# Patient Record
Sex: MALE | Race: WHITE | Employment: FULL TIME | URBAN - METROPOLITAN AREA
[De-identification: names, ages, dates, MRNs, and addresses within clinical notes are randomized per-mention and may not be internally consistent; named-entity substitution may affect disease eponyms.]

---

## 2017-11-13 LAB
ABSOLUTE BASO #: 0 10*3/UL (ref 0–0.1)
ABSOLUTE EOS #: 0 10*3/UL (ref 0–0.4)
ABSOLUTE NEUT #: 4 10*3/UL (ref 2.3–7.9)
ALBUMIN: 3.5 GM/DL (ref 3.1–4.5)
ALP BLD-CCNC: 114 U/L (ref 45–117)
ALT SERPL-CCNC: 39 U/L (ref 12–78)
AST SERPL-CCNC: 14 IU/L (ref 3–35)
BASOPHILS %: 0.5 % (ref 0–1)
BILIRUB SERPL-MCNC: 0.5 MG/DL (ref 0.2–1)
BUN BLDV-MCNC: 24 MG/DL (ref 7–24)
CALCIUM SERPL-MCNC: 9.1 MG/DL (ref 8.5–10.5)
CHLORIDE BLD-SCNC: 101 MMOL/L (ref 98–107)
CHOLESTEROL: 136 MG/DL
CO2: 26 MMOL/L (ref 21–32)
CREAT SERPL-MCNC: 1.09 MG/DL (ref 0.7–1.3)
EOSINOPHILS %: 0 % (ref 1–4)
GFR AFRICAN AMERICAN: > 60 ML/MIN
GFR SERPL CREATININE-BSD FRML MDRD: >60 ML/MIN/
GLUCOSE: 312 MG/DL (ref 65–99)
HCT VFR BLD CALC: 46.1 % (ref 42–52)
HDLC SERPL-MCNC: 26 MG/DL (ref 40–60)
HEMOGLOBIN: 15.9 G/DL (ref 14–18)
IMMATURE GRANULOCYTES #: 0 10*3/UL (ref 0–0.1)
IMMATURE GRANULOCYTES: 0.3 % (ref 0–1)
LDL CHOLESTEROL: 54 MG/DL (ref 9–159)
LYMPHOCYTE %: 30.9 % (ref 27–41)
LYMPHOCYTES # BLD: 2 10*3/UL (ref 1.3–4.4)
MCH RBC QN AUTO: 28.9 PG (ref 27–31)
MCHC RBC AUTO-ENTMCNC: 34.5 G/DL (ref 33–37)
MCV RBC AUTO: 83.8 FL (ref 80–94)
MONOCYTES # BLD: 0.4 10*3/UL (ref 0.1–1)
MONOCYTES %: 6.4 % (ref 3–9)
NEUTROPHILS %: 61.9 % (ref 47–73)
NUCLEATED RED BLOOD CELLS: 0 % (ref 0–0)
PDW BLD-RTO: 12.5 % (ref 0–14.5)
PLATELET # BLD: 203 10*3/UL (ref 130–400)
PMV BLD AUTO: 10 FL (ref 9.6–12.3)
POTASSIUM SERPL-SCNC: 4 MMOL/L (ref 3.5–5.1)
RBC # BLD: 5.5 10*6/UL (ref 4.5–5.9)
SODIUM BLD-SCNC: 137 MMOL/L (ref 136–145)
TOTAL PROTEIN: 7.1 GM/DL (ref 6.4–8.2)
TRIGL SERPL-MCNC: 281 MG/DL
TSH SERPL DL<=0.05 MIU/L-ACNC: 3.58 UIU/ML (ref 0.36–4.75)
VLDLC SERPL CALC-MCNC: 56 MG/DL (ref 6–40)
WBC # BLD: 6.5 10*3/UL (ref 4.8–10.8)

## 2022-08-02 ENCOUNTER — OFFICE VISIT (OUTPATIENT)
Dept: NEUROSURGERY | Age: 57
End: 2022-08-02
Payer: COMMERCIAL

## 2022-08-02 DIAGNOSIS — M51.27 LUMBOSACRAL DISC HERNIATION: Primary | ICD-10-CM

## 2022-08-02 DIAGNOSIS — M54.16 LUMBAR RADICULOPATHY: ICD-10-CM

## 2022-08-02 DIAGNOSIS — M51.9 LUMBAR DISC DISEASE: Primary | ICD-10-CM

## 2022-08-02 PROCEDURE — 99202 OFFICE O/P NEW SF 15 MIN: CPT

## 2022-08-02 PROCEDURE — 99203 OFFICE O/P NEW LOW 30 MIN: CPT | Performed by: PHYSICIAN ASSISTANT

## 2022-08-02 ASSESSMENT — ENCOUNTER SYMPTOMS
BACK PAIN: 1
GASTROINTESTINAL NEGATIVE: 1
ALLERGIC/IMMUNOLOGIC NEGATIVE: 1
EYES NEGATIVE: 1
RESPIRATORY NEGATIVE: 1

## 2022-08-02 NOTE — PROGRESS NOTES
Subjective:      Patient ID: Evelyn Peña is a 49 y.o. male. Back Pain  This is a new problem. Episode onset: 6 weeks. The problem occurs daily. The problem has been gradually worsening since onset. The pain is present in the lumbar spine (and right leg pain/numbness into the foot. ). The quality of the pain is described as aching and shooting. The pain is at a severity of 9/10. The symptoms are aggravated by twisting and standing. Treatments tried: BEATA, PT, gabapentin, motrin. The treatment provided mild relief. Review of Systems   Constitutional: Negative. HENT: Negative. Eyes: Negative. Respiratory: Negative. Cardiovascular: Negative. Gastrointestinal: Negative. Endocrine: Negative. Genitourinary: Negative. Musculoskeletal:  Positive for back pain. Skin: Negative. Allergic/Immunologic: Negative. Neurological: Negative. Hematological: Negative. Psychiatric/Behavioral: Negative. Objective:   Physical Exam  Constitutional:       Appearance: Normal appearance. HENT:      Head: Normocephalic and atraumatic. Nose: Nose normal.   Eyes:      Pupils: Pupils are equal, round, and reactive to light. Pulmonary:      Effort: Pulmonary effort is normal.   Abdominal:      General: There is no distension. Skin:     General: Skin is warm and dry. Neurological:      Mental Status: He is alert. GCS: GCS eye subscore is 4. GCS verbal subscore is 5. GCS motor subscore is 6. Cranial Nerves: Cranial nerves are intact. Sensory: Sensory deficit present. Motor: Weakness present. Gait: Gait is intact. Deep Tendon Reflexes:      Reflex Scores:       Patellar reflexes are 2+ on the right side and 2+ on the left side. Achilles reflexes are 2+ on the right side and 2+ on the left side.      Comments: Decreased sensation to LT right L5 dist.    Right DF 4/5   Psychiatric:         Mood and Affect: Mood normal.       Assessment:      49year old male with 6 week history of severe right leg pain. Lumbar MRI reveals left L4-5 and right L5-S1 disc herniation. He has failed PT, gabapentin, BEATA, and NSAIDS. Plan:      He wishes to proceed with a L4-S1 laminectomy. JOSEP Aquino    I have interviewed and examined the patient and agree with above. He has back and bilateral leg pain and the right side is worse. He has failed over 3 months of PT and epidurals and his MRI shows stenosis from L4-S1 with a right L5-S1 herniated disk.   I am recommending an L4-S1 laminectomy and rigth L5-S1 diskectomy    Codie Sanchez MD

## 2022-08-03 ENCOUNTER — PREP FOR PROCEDURE (OUTPATIENT)
Dept: NEUROSURGERY | Age: 57
End: 2022-08-03

## 2022-08-03 DIAGNOSIS — Z01.818 PRE-OP TESTING: Primary | ICD-10-CM

## 2022-08-03 RX ORDER — SODIUM CHLORIDE 9 MG/ML
INJECTION, SOLUTION INTRAVENOUS PRN
Status: CANCELLED | OUTPATIENT
Start: 2022-08-03

## 2022-08-03 RX ORDER — SODIUM CHLORIDE 9 MG/ML
INJECTION, SOLUTION INTRAVENOUS CONTINUOUS
Status: CANCELLED | OUTPATIENT
Start: 2022-08-03

## 2022-08-03 RX ORDER — SODIUM CHLORIDE 0.9 % (FLUSH) 0.9 %
5-40 SYRINGE (ML) INJECTION EVERY 12 HOURS SCHEDULED
Status: CANCELLED | OUTPATIENT
Start: 2022-08-03

## 2022-08-03 RX ORDER — SODIUM CHLORIDE 0.9 % (FLUSH) 0.9 %
5-40 SYRINGE (ML) INJECTION PRN
Status: CANCELLED | OUTPATIENT
Start: 2022-08-03

## 2022-08-11 RX ORDER — INSULIN DEGLUDEC INJECTION 100 U/ML
80 INJECTION, SOLUTION SUBCUTANEOUS DAILY
COMMUNITY

## 2022-08-11 RX ORDER — AMLODIPINE BESYLATE 10 MG/1
10 TABLET ORAL DAILY
COMMUNITY

## 2022-08-11 RX ORDER — PRAVASTATIN SODIUM 40 MG
40 TABLET ORAL DAILY
COMMUNITY

## 2022-08-11 RX ORDER — SEMAGLUTIDE 1.34 MG/ML
INJECTION, SOLUTION SUBCUTANEOUS WEEKLY
COMMUNITY

## 2022-08-11 RX ORDER — LOSARTAN POTASSIUM 25 MG/1
25 TABLET ORAL DAILY
COMMUNITY

## 2022-08-11 RX ORDER — INSULIN ASPART 100 [IU]/ML
INJECTION, SOLUTION INTRAVENOUS; SUBCUTANEOUS
COMMUNITY

## 2022-08-11 NOTE — PROGRESS NOTES
[address]   PRE-ADMISSION TESTING GENERAL INSTRUCTIONS  PAT Phone Number: [phone]      GENERAL INSTRUCTIONS:    [x] Antibacterial Soap Shower Night before or AM of Surgery. [x] Do not wear makeup, lotions, powders, deodorant. [x] Nothing by mouth after midnight, including gum, candy, mints, or water. [x] You may brush your teeth, gargle, but do NOT swallow water. [x] No tobacco products, illegal drugs, or alcohol within 24 hours of your surgery. [x] Jewelry or valuables should not be brought to the hospital. All body and/or tongue piercing's must be removed prior to arriving to hospital. ALL hair pins must be removed. [x] Bring insurance card and photo ID. [x] Transfusion Bracelet: Please bring with you to hospital, day of surgery. PARKING INSTRUCTIONS:     [x] ARRIVAL TIME: 1100 on 8/26/22  [x] Enter into the [employer]. Two people may accompany you. Masks are required. [x] Parking Lot \"I\" is where you will park. It is located on the corner of [facility] [facility] and [address]. The entrance is on [address]. To enter, press the button and the gate will lift. A free token will be provided to exit the lot. EDUCATION INSTRUCTIONS:         [x] Pre-admission Testing educational folder given  [x] Incentive Spirometry,coughing & deep breathing exercises reviewed. [x] Fluoroscopy-Xray used in surgery reviewed with patient. Educational pamphlet placed in chart. [x] Pain: Post-op pain is normal and to be expected. You will be asked to rate your pain from 0-10. [x] Spine Navigator to see in PAT. MEDICATION INSTRUCTIONS:    [x] Bring a complete list of your medications, please write the last time you took the medicine, give this list to the nurse. [x] Take the following medications the morning of surgery with 1-2 ounces of water: amlodipine  [x] Stop herbal supplements, NSAIDS and vitamins 5 days before your surgery.   [x] DO NOT take any diabetic medicine the morning of surgery. Follow instructions for insulin the day before surgery. [x] If you are diabetic and your blood sugar is low or you feel symptomatic, you may drink 1-2 ounces of apple juice or take a glucose tablet.            -The morning of your procedure, you may call the pre-op area if you have concerns about your blood sugar [phone]. [x] Follow physician instructions regarding any blood thinners you may be taking. WHAT TO EXPECT:    [x] The day of surgery you will be greeted and checked in by the [employer].  In addition, you will be registered in the [facility] by a Patient Access Representative. Please bring your photo ID and insurance card. A nurse will greet you in accordance to the time you are needed in the pre-op area to prepare you for surgery. Please do not be discouraged if you are not greeted in the order you arrive as there are many variables that are involved in patient preparation. Your patience is greatly appreciated as you wait for your nurse. Please bring in items such as: books, magazines, newspapers, electronics, or any other items  to occupy your time in the waiting area. [x]  Delays may occur with surgery and staff will make a sincere effort to keep you informed of delays. If any delays occur with your procedure, we apologize ahead of time for your inconvenience as we recognize the value of your time.

## 2022-08-17 ENCOUNTER — TELEPHONE (OUTPATIENT)
Dept: NEUROSURGERY | Age: 57
End: 2022-08-17

## 2022-08-17 DIAGNOSIS — M48.061 SPINAL STENOSIS OF LUMBAR REGION WITHOUT NEUROGENIC CLAUDICATION: Primary | ICD-10-CM

## 2022-08-17 RX ORDER — OXYCODONE HYDROCHLORIDE AND ACETAMINOPHEN 5; 325 MG/1; MG/1
1 TABLET ORAL EVERY 4 HOURS PRN
Qty: 42 TABLET | Refills: 0 | Status: SHIPPED | OUTPATIENT
Start: 2022-08-17 | End: 2022-08-24

## 2022-08-17 NOTE — TELEPHONE ENCOUNTER
I am able to give him 7 day supply of pain medications, but that is all prior to surgery. Script sent to pharmacy indicated.

## 2022-08-18 ENCOUNTER — TELEPHONE (OUTPATIENT)
Dept: NEUROSURGERY | Age: 57
End: 2022-08-18

## 2022-08-19 ENCOUNTER — HOSPITAL ENCOUNTER (OUTPATIENT)
Dept: GENERAL RADIOLOGY | Age: 57
Discharge: HOME | End: 2022-08-21
Payer: COMMERCIAL

## 2022-08-19 ENCOUNTER — HOSPITAL ENCOUNTER (OUTPATIENT)
Dept: PREADMISSION TESTING | Age: 57
Discharge: HOME | End: 2022-08-19
Payer: COMMERCIAL

## 2022-08-19 VITALS
WEIGHT: 242 LBS | BODY MASS INDEX: 30.09 KG/M2 | SYSTOLIC BLOOD PRESSURE: 145 MMHG | RESPIRATION RATE: 16 BRPM | HEIGHT: 75 IN | DIASTOLIC BLOOD PRESSURE: 82 MMHG | HEART RATE: 85 BPM | OXYGEN SATURATION: 97 % | TEMPERATURE: 98 F

## 2022-08-19 DIAGNOSIS — Z01.818 PRE-OP TESTING: ICD-10-CM

## 2022-08-19 DIAGNOSIS — Z01.812 PRE-OPERATIVE LABORATORY EXAMINATION: Primary | ICD-10-CM

## 2022-08-19 LAB
ABO/RH: NORMAL
ANION GAP SERPL CALCULATED.3IONS-SCNC: 11 MMOL/L (ref 7–16)
ANTIBODY SCREEN: NORMAL
BACTERIA: NORMAL /HPF
BASOPHILS ABSOLUTE: 0.03 E9/L (ref 0–0.2)
BASOPHILS RELATIVE PERCENT: 0.5 % (ref 0–2)
BILIRUBIN URINE: NEGATIVE
BLOOD, URINE: NEGATIVE
BUN BLDV-MCNC: 17 MG/DL (ref 6–20)
CALCIUM SERPL-MCNC: 9 MG/DL (ref 8.6–10.2)
CHLORIDE BLD-SCNC: 103 MMOL/L (ref 98–107)
CLARITY: CLEAR
CO2: 25 MMOL/L (ref 22–29)
COLOR: YELLOW
CREAT SERPL-MCNC: 0.8 MG/DL (ref 0.7–1.2)
EKG ATRIAL RATE: 81 BPM
EKG P AXIS: 50 DEGREES
EKG P-R INTERVAL: 198 MS
EKG Q-T INTERVAL: 370 MS
EKG QRS DURATION: 98 MS
EKG QTC CALCULATION (BAZETT): 429 MS
EKG R AXIS: 16 DEGREES
EKG T AXIS: 57 DEGREES
EKG VENTRICULAR RATE: 81 BPM
EOSINOPHILS ABSOLUTE: 0.22 E9/L (ref 0.05–0.5)
EOSINOPHILS RELATIVE PERCENT: 3.5 % (ref 0–6)
GFR AFRICAN AMERICAN: >60
GFR NON-AFRICAN AMERICAN: >60 ML/MIN/1.73
GLUCOSE BLD-MCNC: 215 MG/DL (ref 74–99)
GLUCOSE URINE: 100 MG/DL
HCT VFR BLD CALC: 41.6 % (ref 37–54)
HEMOGLOBIN: 14.5 G/DL (ref 12.5–16.5)
IMMATURE GRANULOCYTES #: 0.01 E9/L
IMMATURE GRANULOCYTES %: 0.2 % (ref 0–5)
INR BLD: 1
KETONES, URINE: NEGATIVE MG/DL
LEUKOCYTE ESTERASE, URINE: NEGATIVE
LYMPHOCYTES ABSOLUTE: 1.33 E9/L (ref 1.5–4)
LYMPHOCYTES RELATIVE PERCENT: 20.9 % (ref 20–42)
MCH RBC QN AUTO: 29.9 PG (ref 26–35)
MCHC RBC AUTO-ENTMCNC: 34.9 % (ref 32–34.5)
MCV RBC AUTO: 85.8 FL (ref 80–99.9)
MONOCYTES ABSOLUTE: 0.4 E9/L (ref 0.1–0.95)
MONOCYTES RELATIVE PERCENT: 6.3 % (ref 2–12)
NEUTROPHILS ABSOLUTE: 4.37 E9/L (ref 1.8–7.3)
NEUTROPHILS RELATIVE PERCENT: 68.6 % (ref 43–80)
NITRITE, URINE: NEGATIVE
PDW BLD-RTO: 12.6 FL (ref 11.5–15)
PH UA: 6 (ref 5–9)
PLATELET # BLD: 192 E9/L (ref 130–450)
PMV BLD AUTO: 9 FL (ref 7–12)
POTASSIUM REFLEX MAGNESIUM: 4.2 MMOL/L (ref 3.5–5)
PROTEIN UA: >=300 MG/DL
PROTHROMBIN TIME: 11.4 SEC (ref 9.3–12.4)
RBC # BLD: 4.85 E12/L (ref 3.8–5.8)
RBC UA: NORMAL /HPF (ref 0–2)
SODIUM BLD-SCNC: 139 MMOL/L (ref 132–146)
SPECIFIC GRAVITY UA: >=1.03 (ref 1–1.03)
UROBILINOGEN, URINE: 0.2 E.U./DL
WBC # BLD: 6.4 E9/L (ref 4.5–11.5)
WBC UA: NORMAL /HPF (ref 0–5)

## 2022-08-19 PROCEDURE — 87088 URINE BACTERIA CULTURE: CPT

## 2022-08-19 PROCEDURE — 86900 BLOOD TYPING SEROLOGIC ABO: CPT

## 2022-08-19 PROCEDURE — 86901 BLOOD TYPING SEROLOGIC RH(D): CPT

## 2022-08-19 PROCEDURE — 85610 PROTHROMBIN TIME: CPT

## 2022-08-19 PROCEDURE — 80048 BASIC METABOLIC PNL TOTAL CA: CPT

## 2022-08-19 PROCEDURE — 36415 COLL VENOUS BLD VENIPUNCTURE: CPT

## 2022-08-19 PROCEDURE — 85025 COMPLETE CBC W/AUTO DIFF WBC: CPT

## 2022-08-19 PROCEDURE — 86850 RBC ANTIBODY SCREEN: CPT

## 2022-08-19 PROCEDURE — 93005 ELECTROCARDIOGRAM TRACING: CPT

## 2022-08-19 PROCEDURE — 81001 URINALYSIS AUTO W/SCOPE: CPT

## 2022-08-19 PROCEDURE — 71046 X-RAY EXAM CHEST 2 VIEWS: CPT

## 2022-08-19 NOTE — PROGRESS NOTES
Saw pt / wife in PAT, reviewed:    Surgical Procedure-reviewed procedure and post-op events:pre-op/PACU, Unit admission, PT/OT evaluation, Discharge [address] Stay expectations-reviewed importance of early mobilization. Instructions of Spine Precautions given-PT to review on evaluation. Surgical Pathway Booklet-reviewed and given  Post-op/Discharge Instructions-reviewed activity allowances/restrictions  Use of Incentive Spirometer-instructed on importance of use post-op and continued use @ home to prevent complications. Instructions on use given  Setting realistic pain goals-reaching goal before discharge. ORTHOTICS: none ordered/needed for this procedure    Discharge Plans- home with self/family care. Verbalized understanding of all instructions and questions answered. Contact number given.     Kaitlin Mohamud RN, Spine Navigator  [phone] Office  [phone] Cell

## 2022-08-21 LAB — URINE CULTURE, ROUTINE: NORMAL

## 2022-08-25 ENCOUNTER — ANESTHESIA EVENT (OUTPATIENT)
Dept: OPERATING ROOM | Age: 57
DRG: 520 | End: 2022-08-25
Payer: COMMERCIAL

## 2022-08-26 ENCOUNTER — APPOINTMENT (OUTPATIENT)
Dept: GENERAL RADIOLOGY | Age: 57
DRG: 520 | End: 2022-08-26
Attending: NEUROLOGICAL SURGERY
Payer: COMMERCIAL

## 2022-08-26 ENCOUNTER — HOSPITAL ENCOUNTER (INPATIENT)
Age: 57
LOS: 1 days | Discharge: HOME HEALTH | DRG: 520 | End: 2022-08-29
Attending: NEUROLOGICAL SURGERY | Admitting: NEUROLOGICAL SURGERY
Payer: COMMERCIAL

## 2022-08-26 ENCOUNTER — ANESTHESIA (OUTPATIENT)
Dept: OPERATING ROOM | Age: 57
DRG: 520 | End: 2022-08-26
Payer: COMMERCIAL

## 2022-08-26 DIAGNOSIS — M48.062 LUMBAR STENOSIS WITH NEUROGENIC CLAUDICATION: Primary | ICD-10-CM

## 2022-08-26 DIAGNOSIS — M48.061 SPINAL STENOSIS OF LUMBAR REGION, UNSPECIFIED WHETHER NEUROGENIC CLAUDICATION PRESENT: ICD-10-CM

## 2022-08-26 DIAGNOSIS — M51.26 HERNIATED LUMBAR INTERVERTEBRAL DISC: ICD-10-CM

## 2022-08-26 DIAGNOSIS — Z01.812 PRE-OPERATIVE LABORATORY EXAMINATION: ICD-10-CM

## 2022-08-26 LAB
METER GLUCOSE: 105 MG/DL (ref 74–99)
METER GLUCOSE: 112 MG/DL (ref 74–99)
METER GLUCOSE: 150 MG/DL (ref 74–99)
METER GLUCOSE: 185 MG/DL (ref 74–99)
METER GLUCOSE: 245 MG/DL (ref 74–99)
METER GLUCOSE: 91 MG/DL (ref 74–99)
METER GLUCOSE: 98 MG/DL (ref 74–99)

## 2022-08-26 PROCEDURE — 6370000000 HC RX 637 (ALT 250 FOR IP): Performed by: NEUROLOGICAL SURGERY

## 2022-08-26 PROCEDURE — 0SB40ZZ EXCISION OF LUMBOSACRAL DISC, OPEN APPROACH: ICD-10-PCS | Performed by: NEUROLOGICAL SURGERY

## 2022-08-26 PROCEDURE — 63047 LAM FACETEC & FORAMOT LUMBAR: CPT | Performed by: PHYSICIAN ASSISTANT

## 2022-08-26 PROCEDURE — 2500000003 HC RX 250 WO HCPCS: Performed by: NURSE ANESTHETIST, CERTIFIED REGISTERED

## 2022-08-26 PROCEDURE — 6360000002 HC RX W HCPCS: Performed by: NURSE ANESTHETIST, CERTIFIED REGISTERED

## 2022-08-26 PROCEDURE — 2580000003 HC RX 258: Performed by: ANESTHESIOLOGY

## 2022-08-26 PROCEDURE — 6360000002 HC RX W HCPCS: Performed by: NEUROLOGICAL SURGERY

## 2022-08-26 PROCEDURE — 82962 GLUCOSE BLOOD TEST: CPT

## 2022-08-26 PROCEDURE — 88304 TISSUE EXAM BY PATHOLOGIST: CPT

## 2022-08-26 PROCEDURE — 2500000003 HC RX 250 WO HCPCS: Performed by: NEUROLOGICAL SURGERY

## 2022-08-26 PROCEDURE — 3209999900 FLUORO FOR SURGICAL PROCEDURES

## 2022-08-26 PROCEDURE — 2720000010 HC SURG SUPPLY STERILE: Performed by: NEUROLOGICAL SURGERY

## 2022-08-26 PROCEDURE — G0378 HOSPITAL OBSERVATION PER HR: HCPCS

## 2022-08-26 PROCEDURE — 3700000000 HC ANESTHESIA ATTENDED CARE: Performed by: NEUROLOGICAL SURGERY

## 2022-08-26 PROCEDURE — 63030 LAMOT DCMPRN NRV RT 1 LMBR: CPT | Performed by: PHYSICIAN ASSISTANT

## 2022-08-26 PROCEDURE — 2580000003 HC RX 258: Performed by: NEUROLOGICAL SURGERY

## 2022-08-26 PROCEDURE — 2709999900 HC NON-CHARGEABLE SUPPLY: Performed by: NEUROLOGICAL SURGERY

## 2022-08-26 PROCEDURE — 2580000003 HC RX 258: Performed by: PHYSICIAN ASSISTANT

## 2022-08-26 PROCEDURE — 3600000004 HC SURGERY LEVEL 4 BASE: Performed by: NEUROLOGICAL SURGERY

## 2022-08-26 PROCEDURE — 6360000002 HC RX W HCPCS: Performed by: PHYSICIAN ASSISTANT

## 2022-08-26 PROCEDURE — A4217 STERILE WATER/SALINE, 500 ML: HCPCS | Performed by: NEUROLOGICAL SURGERY

## 2022-08-26 PROCEDURE — 00NY0ZZ RELEASE LUMBAR SPINAL CORD, OPEN APPROACH: ICD-10-PCS | Performed by: NEUROLOGICAL SURGERY

## 2022-08-26 PROCEDURE — 6360000002 HC RX W HCPCS: Performed by: ANESTHESIOLOGY

## 2022-08-26 PROCEDURE — 7100000000 HC PACU RECOVERY - FIRST 15 MIN: Performed by: NEUROLOGICAL SURGERY

## 2022-08-26 PROCEDURE — 63047 LAM FACETEC & FORAMOT LUMBAR: CPT | Performed by: NEUROLOGICAL SURGERY

## 2022-08-26 PROCEDURE — 2580000003 HC RX 258: Performed by: NURSE ANESTHETIST, CERTIFIED REGISTERED

## 2022-08-26 PROCEDURE — 3700000001 HC ADD 15 MINUTES (ANESTHESIA): Performed by: NEUROLOGICAL SURGERY

## 2022-08-26 PROCEDURE — 3600000014 HC SURGERY LEVEL 4 ADDTL 15MIN: Performed by: NEUROLOGICAL SURGERY

## 2022-08-26 PROCEDURE — 7100000001 HC PACU RECOVERY - ADDTL 15 MIN: Performed by: NEUROLOGICAL SURGERY

## 2022-08-26 PROCEDURE — 63030 LAMOT DCMPRN NRV RT 1 LMBR: CPT | Performed by: NEUROLOGICAL SURGERY

## 2022-08-26 RX ORDER — LIDOCAINE HYDROCHLORIDE 20 MG/ML
INJECTION, SOLUTION INTRAVENOUS PRN
Status: DISCONTINUED | OUTPATIENT
Start: 2022-08-26 | End: 2022-08-26 | Stop reason: SDUPTHER

## 2022-08-26 RX ORDER — DEXTROSE MONOHYDRATE 100 MG/ML
INJECTION, SOLUTION INTRAVENOUS CONTINUOUS PRN
Status: DISCONTINUED | OUTPATIENT
Start: 2022-08-26 | End: 2022-08-29 | Stop reason: HOSPADM

## 2022-08-26 RX ORDER — SODIUM CHLORIDE 0.9 % (FLUSH) 0.9 %
5-40 SYRINGE (ML) INJECTION PRN
Status: DISCONTINUED | OUTPATIENT
Start: 2022-08-26 | End: 2022-08-29 | Stop reason: HOSPADM

## 2022-08-26 RX ORDER — ONDANSETRON 4 MG/1
4 TABLET, ORALLY DISINTEGRATING ORAL EVERY 8 HOURS PRN
Status: DISCONTINUED | OUTPATIENT
Start: 2022-08-26 | End: 2022-08-29 | Stop reason: HOSPADM

## 2022-08-26 RX ORDER — MIDAZOLAM HYDROCHLORIDE 1 MG/ML
INJECTION INTRAMUSCULAR; INTRAVENOUS PRN
Status: DISCONTINUED | OUTPATIENT
Start: 2022-08-26 | End: 2022-08-26 | Stop reason: SDUPTHER

## 2022-08-26 RX ORDER — INSULIN LISPRO 100 [IU]/ML
0-8 INJECTION, SOLUTION INTRAVENOUS; SUBCUTANEOUS
Status: DISCONTINUED | OUTPATIENT
Start: 2022-08-27 | End: 2022-08-29 | Stop reason: HOSPADM

## 2022-08-26 RX ORDER — MORPHINE SULFATE 2 MG/ML
2 INJECTION, SOLUTION INTRAMUSCULAR; INTRAVENOUS
Status: DISCONTINUED | OUTPATIENT
Start: 2022-08-26 | End: 2022-08-29 | Stop reason: HOSPADM

## 2022-08-26 RX ORDER — OXYCODONE HYDROCHLORIDE 5 MG/1
5 TABLET ORAL EVERY 4 HOURS PRN
Status: DISCONTINUED | OUTPATIENT
Start: 2022-08-26 | End: 2022-08-29 | Stop reason: HOSPADM

## 2022-08-26 RX ORDER — KETAMINE HCL IN NACL, ISO-OSM 100MG/10ML
SYRINGE (ML) INJECTION PRN
Status: DISCONTINUED | OUTPATIENT
Start: 2022-08-26 | End: 2022-08-26 | Stop reason: SDUPTHER

## 2022-08-26 RX ORDER — SODIUM CHLORIDE 9 MG/ML
INJECTION, SOLUTION INTRAVENOUS CONTINUOUS PRN
Status: DISCONTINUED | OUTPATIENT
Start: 2022-08-26 | End: 2022-08-26 | Stop reason: SDUPTHER

## 2022-08-26 RX ORDER — SODIUM CHLORIDE 0.9 % (FLUSH) 0.9 %
5-40 SYRINGE (ML) INJECTION PRN
Status: DISCONTINUED | OUTPATIENT
Start: 2022-08-26 | End: 2022-08-26 | Stop reason: HOSPADM

## 2022-08-26 RX ORDER — OXYCODONE HYDROCHLORIDE 5 MG/1
10 TABLET ORAL PRN
Status: DISCONTINUED | OUTPATIENT
Start: 2022-08-26 | End: 2022-08-26 | Stop reason: HOSPADM

## 2022-08-26 RX ORDER — DIPHENHYDRAMINE HCL 25 MG
25 TABLET ORAL EVERY 6 HOURS PRN
Status: DISCONTINUED | OUTPATIENT
Start: 2022-08-26 | End: 2022-08-29 | Stop reason: HOSPADM

## 2022-08-26 RX ORDER — SODIUM PHOSPHATE, DIBASIC AND SODIUM PHOSPHATE, MONOBASIC 7; 19 G/133ML; G/133ML
1 ENEMA RECTAL DAILY PRN
Status: DISCONTINUED | OUTPATIENT
Start: 2022-08-26 | End: 2022-08-29 | Stop reason: HOSPADM

## 2022-08-26 RX ORDER — PROPOFOL 10 MG/ML
INJECTION, EMULSION INTRAVENOUS PRN
Status: DISCONTINUED | OUTPATIENT
Start: 2022-08-26 | End: 2022-08-26 | Stop reason: SDUPTHER

## 2022-08-26 RX ORDER — DIPHENHYDRAMINE HYDROCHLORIDE 50 MG/ML
25 INJECTION INTRAMUSCULAR; INTRAVENOUS EVERY 6 HOURS PRN
Status: DISCONTINUED | OUTPATIENT
Start: 2022-08-26 | End: 2022-08-29 | Stop reason: HOSPADM

## 2022-08-26 RX ORDER — PRAVASTATIN SODIUM 20 MG
40 TABLET ORAL DAILY
Status: DISCONTINUED | OUTPATIENT
Start: 2022-08-27 | End: 2022-08-29 | Stop reason: HOSPADM

## 2022-08-26 RX ORDER — BISACODYL 10 MG
10 SUPPOSITORY, RECTAL RECTAL DAILY PRN
Status: DISCONTINUED | OUTPATIENT
Start: 2022-08-26 | End: 2022-08-29 | Stop reason: HOSPADM

## 2022-08-26 RX ORDER — SODIUM CHLORIDE 9 MG/ML
INJECTION, SOLUTION INTRAVENOUS PRN
Status: DISCONTINUED | OUTPATIENT
Start: 2022-08-26 | End: 2022-08-26 | Stop reason: HOSPADM

## 2022-08-26 RX ORDER — GLYCOPYRROLATE 0.2 MG/ML
INJECTION INTRAMUSCULAR; INTRAVENOUS PRN
Status: DISCONTINUED | OUTPATIENT
Start: 2022-08-26 | End: 2022-08-26 | Stop reason: SDUPTHER

## 2022-08-26 RX ORDER — ONDANSETRON 2 MG/ML
4 INJECTION INTRAMUSCULAR; INTRAVENOUS EVERY 6 HOURS PRN
Status: DISCONTINUED | OUTPATIENT
Start: 2022-08-26 | End: 2022-08-29 | Stop reason: HOSPADM

## 2022-08-26 RX ORDER — MEPERIDINE HYDROCHLORIDE 25 MG/ML
12.5 INJECTION INTRAMUSCULAR; INTRAVENOUS; SUBCUTANEOUS EVERY 5 MIN PRN
Status: DISCONTINUED | OUTPATIENT
Start: 2022-08-26 | End: 2022-08-26 | Stop reason: HOSPADM

## 2022-08-26 RX ORDER — NEOSTIGMINE METHYLSULFATE 1 MG/ML
INJECTION, SOLUTION INTRAVENOUS PRN
Status: DISCONTINUED | OUTPATIENT
Start: 2022-08-26 | End: 2022-08-26 | Stop reason: SDUPTHER

## 2022-08-26 RX ORDER — VANCOMYCIN HYDROCHLORIDE 500 MG/10ML
INJECTION, POWDER, LYOPHILIZED, FOR SOLUTION INTRAVENOUS PRN
Status: DISCONTINUED | OUTPATIENT
Start: 2022-08-26 | End: 2022-08-26 | Stop reason: HOSPADM

## 2022-08-26 RX ORDER — SODIUM CHLORIDE 9 MG/ML
INJECTION, SOLUTION INTRAVENOUS CONTINUOUS
Status: DISCONTINUED | OUTPATIENT
Start: 2022-08-26 | End: 2022-08-26 | Stop reason: HOSPADM

## 2022-08-26 RX ORDER — DOCUSATE SODIUM 100 MG/1
100 CAPSULE, LIQUID FILLED ORAL DAILY
Status: DISCONTINUED | OUTPATIENT
Start: 2022-08-27 | End: 2022-08-29 | Stop reason: HOSPADM

## 2022-08-26 RX ORDER — LOSARTAN POTASSIUM 25 MG/1
25 TABLET ORAL DAILY
Status: DISCONTINUED | OUTPATIENT
Start: 2022-08-27 | End: 2022-08-29 | Stop reason: HOSPADM

## 2022-08-26 RX ORDER — OXYCODONE HYDROCHLORIDE 10 MG/1
10 TABLET ORAL EVERY 4 HOURS PRN
Status: DISCONTINUED | OUTPATIENT
Start: 2022-08-26 | End: 2022-08-29 | Stop reason: HOSPADM

## 2022-08-26 RX ORDER — ROCURONIUM BROMIDE 10 MG/ML
INJECTION, SOLUTION INTRAVENOUS PRN
Status: DISCONTINUED | OUTPATIENT
Start: 2022-08-26 | End: 2022-08-26 | Stop reason: SDUPTHER

## 2022-08-26 RX ORDER — SODIUM CHLORIDE 0.9 % (FLUSH) 0.9 %
5-40 SYRINGE (ML) INJECTION EVERY 12 HOURS SCHEDULED
Status: DISCONTINUED | OUTPATIENT
Start: 2022-08-26 | End: 2022-08-29 | Stop reason: HOSPADM

## 2022-08-26 RX ORDER — ACETAMINOPHEN 325 MG/1
650 TABLET ORAL EVERY 6 HOURS
Status: DISCONTINUED | OUTPATIENT
Start: 2022-08-26 | End: 2022-08-29 | Stop reason: HOSPADM

## 2022-08-26 RX ORDER — OXYCODONE HYDROCHLORIDE AND ACETAMINOPHEN 5; 325 MG/1; MG/1
1 TABLET ORAL EVERY 4 HOURS PRN
Status: DISCONTINUED | OUTPATIENT
Start: 2022-08-26 | End: 2022-08-26

## 2022-08-26 RX ORDER — POLYETHYLENE GLYCOL 3350 17 G/17G
17 POWDER, FOR SOLUTION ORAL DAILY
Status: DISCONTINUED | OUTPATIENT
Start: 2022-08-27 | End: 2022-08-29 | Stop reason: HOSPADM

## 2022-08-26 RX ORDER — DEXTROSE MONOHYDRATE 100 MG/ML
INJECTION, SOLUTION INTRAVENOUS CONTINUOUS
Status: DISCONTINUED | OUTPATIENT
Start: 2022-08-26 | End: 2022-08-29 | Stop reason: HOSPADM

## 2022-08-26 RX ORDER — OXYCODONE HYDROCHLORIDE 5 MG/1
5 TABLET ORAL PRN
Status: DISCONTINUED | OUTPATIENT
Start: 2022-08-26 | End: 2022-08-26 | Stop reason: HOSPADM

## 2022-08-26 RX ORDER — AMLODIPINE BESYLATE 10 MG/1
10 TABLET ORAL DAILY
Status: DISCONTINUED | OUTPATIENT
Start: 2022-08-27 | End: 2022-08-29 | Stop reason: HOSPADM

## 2022-08-26 RX ORDER — FENTANYL CITRATE 50 UG/ML
INJECTION, SOLUTION INTRAMUSCULAR; INTRAVENOUS PRN
Status: DISCONTINUED | OUTPATIENT
Start: 2022-08-26 | End: 2022-08-26 | Stop reason: SDUPTHER

## 2022-08-26 RX ORDER — MORPHINE SULFATE 2 MG/ML
4 INJECTION, SOLUTION INTRAMUSCULAR; INTRAVENOUS
Status: DISCONTINUED | OUTPATIENT
Start: 2022-08-26 | End: 2022-08-29 | Stop reason: HOSPADM

## 2022-08-26 RX ORDER — INSULIN LISPRO 100 [IU]/ML
0-4 INJECTION, SOLUTION INTRAVENOUS; SUBCUTANEOUS NIGHTLY
Status: DISCONTINUED | OUTPATIENT
Start: 2022-08-26 | End: 2022-08-29 | Stop reason: HOSPADM

## 2022-08-26 RX ORDER — LIDOCAINE HYDROCHLORIDE AND EPINEPHRINE 5; 5 MG/ML; UG/ML
INJECTION, SOLUTION INFILTRATION; PERINEURAL PRN
Status: DISCONTINUED | OUTPATIENT
Start: 2022-08-26 | End: 2022-08-26 | Stop reason: HOSPADM

## 2022-08-26 RX ORDER — PHENYLEPHRINE HCL IN 0.9% NACL 1 MG/10 ML
SYRINGE (ML) INTRAVENOUS PRN
Status: DISCONTINUED | OUTPATIENT
Start: 2022-08-26 | End: 2022-08-26 | Stop reason: SDUPTHER

## 2022-08-26 RX ORDER — DEXAMETHASONE SODIUM PHOSPHATE 10 MG/ML
INJECTION INTRAMUSCULAR; INTRAVENOUS PRN
Status: DISCONTINUED | OUTPATIENT
Start: 2022-08-26 | End: 2022-08-26 | Stop reason: SDUPTHER

## 2022-08-26 RX ORDER — ONDANSETRON 2 MG/ML
INJECTION INTRAMUSCULAR; INTRAVENOUS PRN
Status: DISCONTINUED | OUTPATIENT
Start: 2022-08-26 | End: 2022-08-26 | Stop reason: SDUPTHER

## 2022-08-26 RX ORDER — SODIUM CHLORIDE 0.9 % (FLUSH) 0.9 %
5-40 SYRINGE (ML) INJECTION EVERY 12 HOURS SCHEDULED
Status: DISCONTINUED | OUTPATIENT
Start: 2022-08-26 | End: 2022-08-26 | Stop reason: HOSPADM

## 2022-08-26 RX ORDER — CYCLOBENZAPRINE HCL 10 MG
10 TABLET ORAL 3 TIMES DAILY PRN
Status: DISCONTINUED | OUTPATIENT
Start: 2022-08-26 | End: 2022-08-29 | Stop reason: HOSPADM

## 2022-08-26 RX ORDER — DIPHENHYDRAMINE HYDROCHLORIDE 50 MG/ML
12.5 INJECTION INTRAMUSCULAR; INTRAVENOUS
Status: DISCONTINUED | OUTPATIENT
Start: 2022-08-26 | End: 2022-08-26 | Stop reason: HOSPADM

## 2022-08-26 RX ORDER — SODIUM CHLORIDE 9 MG/ML
INJECTION, SOLUTION INTRAVENOUS PRN
Status: DISCONTINUED | OUTPATIENT
Start: 2022-08-26 | End: 2022-08-29 | Stop reason: HOSPADM

## 2022-08-26 RX ORDER — SENNA AND DOCUSATE SODIUM 50; 8.6 MG/1; MG/1
1 TABLET, FILM COATED ORAL 2 TIMES DAILY
Status: DISCONTINUED | OUTPATIENT
Start: 2022-08-26 | End: 2022-08-29 | Stop reason: HOSPADM

## 2022-08-26 RX ORDER — SODIUM CHLORIDE 9 MG/ML
INJECTION, SOLUTION INTRAVENOUS CONTINUOUS
Status: DISCONTINUED | OUTPATIENT
Start: 2022-08-26 | End: 2022-08-29 | Stop reason: HOSPADM

## 2022-08-26 RX ORDER — BUPIVACAINE HYDROCHLORIDE 2.5 MG/ML
INJECTION, SOLUTION EPIDURAL; INFILTRATION; INTRACAUDAL PRN
Status: DISCONTINUED | OUTPATIENT
Start: 2022-08-26 | End: 2022-08-26 | Stop reason: HOSPADM

## 2022-08-26 RX ADMIN — SODIUM CHLORIDE: 9 INJECTION, SOLUTION INTRAVENOUS at 21:50

## 2022-08-26 RX ADMIN — DEXTROSE MONOHYDRATE: 100 INJECTION, SOLUTION INTRAVENOUS at 13:44

## 2022-08-26 RX ADMIN — MEPERIDINE HYDROCHLORIDE 12.5 MG: 25 INJECTION, SOLUTION INTRAMUSCULAR; INTRAVENOUS; SUBCUTANEOUS at 19:43

## 2022-08-26 RX ADMIN — PROPOFOL 150 MG: 10 INJECTION, EMULSION INTRAVENOUS at 17:13

## 2022-08-26 RX ADMIN — SODIUM CHLORIDE: 9 INJECTION, SOLUTION INTRAVENOUS at 13:43

## 2022-08-26 RX ADMIN — FENTANYL CITRATE 100 MCG: 50 INJECTION, SOLUTION INTRAMUSCULAR; INTRAVENOUS at 17:13

## 2022-08-26 RX ADMIN — SODIUM CHLORIDE: 9 INJECTION, SOLUTION INTRAVENOUS at 18:04

## 2022-08-26 RX ADMIN — DEXAMETHASONE SODIUM PHOSPHATE 10 MG: 10 INJECTION INTRAMUSCULAR; INTRAVENOUS at 17:36

## 2022-08-26 RX ADMIN — SODIUM CHLORIDE, PRESERVATIVE FREE 10 ML: 5 INJECTION INTRAVENOUS at 21:48

## 2022-08-26 RX ADMIN — OXYCODONE AND ACETAMINOPHEN 1 TABLET: 5; 325 TABLET ORAL at 13:34

## 2022-08-26 RX ADMIN — Medication 100 MCG: at 18:13

## 2022-08-26 RX ADMIN — ROCURONIUM BROMIDE 50 MG: 10 INJECTION, SOLUTION INTRAVENOUS at 17:13

## 2022-08-26 RX ADMIN — FENTANYL CITRATE 50 MCG: 50 INJECTION, SOLUTION INTRAMUSCULAR; INTRAVENOUS at 19:05

## 2022-08-26 RX ADMIN — Medication 100 MCG: at 18:18

## 2022-08-26 RX ADMIN — Medication 100 MCG: at 18:04

## 2022-08-26 RX ADMIN — ACETAMINOPHEN 650 MG: 325 TABLET, FILM COATED ORAL at 21:48

## 2022-08-26 RX ADMIN — MIDAZOLAM 2 MG: 1 INJECTION INTRAMUSCULAR; INTRAVENOUS at 17:05

## 2022-08-26 RX ADMIN — LIDOCAINE HYDROCHLORIDE 100 MG: 20 INJECTION, SOLUTION INTRAVENOUS at 17:13

## 2022-08-26 RX ADMIN — Medication 100 MCG: at 18:07

## 2022-08-26 RX ADMIN — SODIUM CHLORIDE: 9 INJECTION, SOLUTION INTRAVENOUS at 17:40

## 2022-08-26 RX ADMIN — DOCUSATE SODIUM 50 MG AND SENNOSIDES 8.6 MG 1 TABLET: 8.6; 5 TABLET, FILM COATED ORAL at 21:48

## 2022-08-26 RX ADMIN — Medication 100 MCG: at 18:02

## 2022-08-26 RX ADMIN — Medication 3 MG: at 18:57

## 2022-08-26 RX ADMIN — ROCURONIUM BROMIDE 40 MG: 10 INJECTION, SOLUTION INTRAVENOUS at 17:53

## 2022-08-26 RX ADMIN — HYDROMORPHONE HYDROCHLORIDE 0.5 MG: 1 INJECTION, SOLUTION INTRAMUSCULAR; INTRAVENOUS; SUBCUTANEOUS at 19:39

## 2022-08-26 RX ADMIN — ONDANSETRON 4 MG: 2 INJECTION INTRAMUSCULAR; INTRAVENOUS at 18:35

## 2022-08-26 RX ADMIN — CEFAZOLIN 2000 MG: 2 INJECTION, POWDER, FOR SOLUTION INTRAMUSCULAR; INTRAVENOUS at 17:37

## 2022-08-26 RX ADMIN — Medication 40 MG: at 17:13

## 2022-08-26 RX ADMIN — MEPERIDINE HYDROCHLORIDE 12.5 MG: 25 INJECTION, SOLUTION INTRAMUSCULAR; INTRAVENOUS; SUBCUTANEOUS at 19:48

## 2022-08-26 RX ADMIN — Medication 100 MCG: at 18:30

## 2022-08-26 RX ADMIN — Medication 100 MCG: at 17:58

## 2022-08-26 RX ADMIN — HYDROMORPHONE HYDROCHLORIDE 0.5 MG: 1 INJECTION, SOLUTION INTRAMUSCULAR; INTRAVENOUS; SUBCUTANEOUS at 19:45

## 2022-08-26 RX ADMIN — Medication 100 MCG: at 18:41

## 2022-08-26 RX ADMIN — GLYCOPYRROLATE 0.6 MG: 0.2 INJECTION, SOLUTION INTRAMUSCULAR; INTRAVENOUS at 18:57

## 2022-08-26 ASSESSMENT — PAIN SCALES - GENERAL
PAINLEVEL_OUTOF10: 5
PAINLEVEL_OUTOF10: 8
PAINLEVEL_OUTOF10: 8
PAINLEVEL_OUTOF10: 5
PAINLEVEL_OUTOF10: 9

## 2022-08-26 ASSESSMENT — PAIN - FUNCTIONAL ASSESSMENT
PAIN_FUNCTIONAL_ASSESSMENT: ACTIVITIES ARE NOT PREVENTED
PAIN_FUNCTIONAL_ASSESSMENT: 0-10

## 2022-08-26 ASSESSMENT — PAIN DESCRIPTION - ORIENTATION
ORIENTATION: INNER
ORIENTATION: RIGHT

## 2022-08-26 ASSESSMENT — PAIN DESCRIPTION - PAIN TYPE: TYPE: SURGICAL PAIN;ACUTE PAIN

## 2022-08-26 ASSESSMENT — PAIN DESCRIPTION - DESCRIPTORS
DESCRIPTORS: SORE;ACHING;DISCOMFORT
DESCRIPTORS: DISCOMFORT;DULL;TENDER
DESCRIPTORS: ACHING
DESCRIPTORS: DISCOMFORT;TENDER;DULL
DESCRIPTORS: DULL;ACHING;DISCOMFORT

## 2022-08-26 ASSESSMENT — PAIN DESCRIPTION - LOCATION
LOCATION: BACK

## 2022-08-26 ASSESSMENT — PAIN DESCRIPTION - ONSET: ONSET: ON-GOING

## 2022-08-26 ASSESSMENT — PAIN DESCRIPTION - FREQUENCY: FREQUENCY: CONTINUOUS

## 2022-08-26 NOTE — H&P
Back Pain  This is a new problem. Episode onset: 6 weeks. The problem occurs daily. The problem has been gradually worsening since onset. The pain is present in the lumbar spine (and right leg pain/numbness into the foot. ). The quality of the pain is described as aching and shooting. The pain is at a severity of 9/10. The symptoms are aggravated by twisting and standing. Treatments tried: BEATA, PT, gabapentin, motrin. The treatment provided mild relief. Past Medical History:   Diagnosis Date    Diabetes mellitus ([facility] [address]      Past Surgical History:   Procedure Laterality Date    BACK SURGERY  2010    discectomy     Social History     Socioeconomic History    Marital status:      Spouse name: Not on file    Number of children: Not on file    Years of education: Not on file    Highest education level: Not on file   Occupational History    Not on file   Tobacco Use    Smoking status: Never    Smokeless tobacco: Never   Vaping Use    Vaping Use: Never used   Substance and Sexual Activity    Alcohol use: Not Currently    Drug use: Never    Sexual activity: Not on file   Other Topics Concern    Not on file   Social History Narrative    Not on file     Social Determinants of Health     Financial Resource Strain: Not on file   Food Insecurity: Not on file   Transportation Needs: Not on file   Physical Activity: Not on file   Stress: Not on file   Social Connections: Not on file   Intimate Partner Violence: Not on file   Housing Stability: Not on file     No family history on file. Scheduled Meds:  Continuous Infusions:  PRN Meds:. No Known Allergies       Review of Systems   Constitutional: Negative. HENT: Negative. Eyes: Negative. Respiratory: Negative. Cardiovascular: Negative. Gastrointestinal: Negative. Endocrine: Negative. Genitourinary: Negative. Musculoskeletal:  Positive for back pain. Skin: Negative. Allergic/Immunologic: Negative. Neurological: Negative.     Hematological:

## 2022-08-26 NOTE — ANESTHESIA PRE PROCEDURE
Department of Anesthesiology  Preprocedure Note       Name:  Thomas Mitchell   Age:  49 y.o.  :  1965                                          MRN:  [id number]         Date:  2022      Surgeon: Bola Mitchell):  Divya Garcia MD    Procedure: Procedure(s):  L4-S1 LAMINECTOMY, RIGHT L5-S1 DISCECTOMY--NEEDS C-ARM, ALFA TABLE    Medications prior to admission:   Prior to Admission medications    Medication Sig Start Date End Date Taking? Authorizing Provider   Semaglutide,0.25 or 0.5MG/DOS, (OZEMPIC, 0.25 OR 0.5 MG/DOSE,) 2 MG/1.5ML SOPN Inject into the skin once a week On Sundays    Historical Provider, MD   Insulin Degludec (TRESIBA) 100 UNIT/ML SOLN Inject 80 Units into the skin daily    Historical Provider, MD   insulin aspart (NOVOLOG) 100 UNIT/ML injection vial Inject into the skin 3 times daily (before meals) -200  5 Units  -250 10 Units  >250 14 Units    Historical Provider, MD   metFORMIN (GLUCOPHAGE) 500 MG tablet Take 1,000 mg by mouth in the morning and 1,000 mg in the evening. Take with meals. Historical Provider, MD   pravastatin (PRAVACHOL) 40 MG tablet Take 40 mg by mouth in the morning. Historical Provider, MD   amLODIPine (NORVASC) 10 MG tablet Take 10 mg by mouth in the morning. Historical Provider, MD   losartan (COZAAR) 25 MG tablet Take 25 mg by mouth in the morning.     Historical Provider, MD       Current medications:    Current Facility-Administered Medications   Medication Dose Route Frequency Provider Last Rate Last Admin    0.9 % sodium chloride infusion   IntraVENous Continuous Dylon Marroom, PA-C        sodium chloride flush 0.9 % injection 5-40 mL  5-40 mL IntraVENous 2 times per day Dylon Huangegroom, PA-C        sodium chloride flush 0.9 % injection 5-40 mL  5-40 mL IntraVENous PRN Dylon Marroom, PA-C        0.9 % sodium chloride infusion   IntraVENous PRN Dylon Mojica, PA-C        ceFAZolin (ANCEF) 2,000 mg in sterile water 20 mL IV syringe  2,000 mg Monitor. IntraVENous On Call to [facility] [address], [place]           Allergies:  No Known Allergies    Problem List:  There is no problem list on file for this patient. Past Medical History:        Diagnosis Date    Diabetes mellitus ([address]        Past Surgical History:        Procedure Laterality Date    BACK SURGERY  2010    discectomy       Social History:    Social History     Tobacco Use    Smoking status: Never    Smokeless tobacco: Never   Substance Use Topics    Alcohol use: Not Currently                                Counseling given: Not Answered      Vital Signs (Current):   Vitals:    08/26/22 1057   BP: (!) 148/89   Pulse: 81   Resp: 18   Temp: 97.5 °F (36.4 °C)   TempSrc: Temporal   SpO2: 96%   Weight: 240 lb (108.9 kg)   Height: 6' 3\" (1.905 m)                                              BP Readings from Last 3 Encounters:   08/26/22 (!) 148/89   08/19/22 (!) 145/82       NPO Status: Time of last liquid consumption: 2200                        Time of last solid consumption: 1800                        Date of last liquid consumption: 08/25/22                        Date of last solid food consumption: 08/25/22    BMI:   Wt Readings from Last 3 Encounters:   08/26/22 240 lb (108.9 kg)   08/19/22 242 lb (109.8 kg)     Body mass index is 30 kg/m².     CBC:   Lab Results   Component Value Date/Time    WBC 6.4 08/19/2022 09:40 AM    RBC 4.85 08/19/2022 09:40 AM    HGB 14.5 08/19/2022 09:40 AM    HCT 41.6 08/19/2022 09:40 AM    MCV 85.8 08/19/2022 09:40 AM    RDW 12.6 08/19/2022 09:40 AM     08/19/2022 09:40 AM       CMP:   Lab Results   Component Value Date/Time     08/19/2022 09:40 AM    K 4.2 08/19/2022 09:40 AM     08/19/2022 09:40 AM    CO2 25 08/19/2022 09:40 AM    BUN 17 08/19/2022 09:40 AM    CREATININE 0.8 08/19/2022 09:40 AM    GFRAA >60 08/19/2022 09:40 AM    LABGLOM >60 08/19/2022 09:40 AM    LABGLOM >60 11/13/2017 09:18 AM    GLUCOSE 215 08/19/2022 09:40 AM    GLUCOSE 312 11/13/2017 09:18 AM    PROT 7.1 11/13/2017 09:18 AM    CALCIUM 9.0 08/19/2022 09:40 AM    BILITOT 0.5 11/13/2017 09:18 AM    ALKPHOS 114 11/13/2017 09:18 AM    AST 14 11/13/2017 09:18 AM    ALT 39 11/13/2017 09:18 AM       POC Tests: No results for input(s): POCGLU, POCNA, POCK, POCCL, POCBUN, POCHEMO, POCHCT in the last 72 hours. Coags:   Lab Results   Component Value Date/Time    PROTIME 11.4 08/19/2022 09:40 AM    INR 1.0 08/19/2022 09:40 AM       HCG (If Applicable): No results found for: PREGTESTUR, PREGSERUM, HCG, HCGQUANT     ABGs: No results found for: PHART, PO2ART, BIT9LUH, EKD1VAB, BEART, V9DDSLAB     Type & Screen (If Applicable):  No results found for: LABABO, LABRH    Drug/Infectious Status (If Applicable):  No results found for: HIV, HEPCAB    COVID-19 Screening (If Applicable): No results found for: COVID19        Anesthesia Evaluation  Patient summary reviewed no history of anesthetic complications:   Airway: Mallampati: II  TM distance: >3 FB   Neck ROM: full     Dental:          Pulmonary:Negative Pulmonary ROS breath sounds clear to auscultation                             Cardiovascular:    (+) hypertension:, hyperlipidemia      ECG reviewed  Rhythm: regular  Rate: normal           Beta Blocker:  Not on Beta Blocker         Neuro/Psych:   Negative Neuro/Psych ROS              GI/Hepatic/Renal:   (+) hiatal hernia,           Endo/Other:    (+) DiabetesType II DM, using insulin, . ROS comment: obese Abdominal:             Vascular: negative vascular ROS. Other Findings:           Anesthesia Plan      general     ASA 3       Induction: intravenous. MIPS: Postoperative opioids intended and Prophylactic antiemetics administered. Anesthetic plan and risks discussed with patient. Plan discussed with CRNA.                     Sheila Page MD   8/26/2022

## 2022-08-26 NOTE — BRIEF OP NOTE
Brief Postoperative Note      Patient: John Llanes  YOB: 1965  MRN: [id number]    Date of Procedure: 8/26/2022    Pre-Op Diagnosis: RIGHT L5-S1 HERNIATED DISC, L4-S1 STENOSIS    Post-Op Diagnosis: Same       Procedure(s):  L4-S1 LAMINECTOMY, RIGHT L5-S1 DISCECTOMY    Surgeon(s):  Asher Schirmer, MD    Assistant:  Physician Assistant: Eve Yeh PA-C    Anesthesia: General    Estimated Blood Loss (mL): less than 50     Complications: None    Specimens:   ID Type Source Tests Collected by Time Destination   A : Right L5-S1 Lumbar Disc Tissue Tissue SURGICAL PATHOLOGY Asher Schirmer, MD 8/26/2022 1803        Implants:  * No implants in log *      Drains: * No LDAs found *    Findings: see dictated op note    Electronically signed by Dakota Kelly MD on 8/26/2022 at 7:02 PM

## 2022-08-26 NOTE — H&P
I have examined the patient and reviewed the H and P and no changes are noted.   Right leg is worse    Castillo Schuster MD

## 2022-08-27 LAB
ANION GAP SERPL CALCULATED.3IONS-SCNC: 10 MMOL/L (ref 7–16)
BUN BLDV-MCNC: 17 MG/DL (ref 6–20)
CALCIUM SERPL-MCNC: 9 MG/DL (ref 8.6–10.2)
CHLORIDE BLD-SCNC: 103 MMOL/L (ref 98–107)
CO2: 26 MMOL/L (ref 22–29)
CREAT SERPL-MCNC: 1 MG/DL (ref 0.7–1.2)
GFR AFRICAN AMERICAN: >60
GFR NON-AFRICAN AMERICAN: >60 ML/MIN/1.73
GLUCOSE BLD-MCNC: 262 MG/DL (ref 74–99)
HCT VFR BLD CALC: 39.4 % (ref 37–54)
HEMOGLOBIN: 13.8 G/DL (ref 12.5–16.5)
MCH RBC QN AUTO: 29.4 PG (ref 26–35)
MCHC RBC AUTO-ENTMCNC: 35 % (ref 32–34.5)
MCV RBC AUTO: 84 FL (ref 80–99.9)
METER GLUCOSE: 221 MG/DL (ref 74–99)
METER GLUCOSE: 221 MG/DL (ref 74–99)
METER GLUCOSE: 259 MG/DL (ref 74–99)
METER GLUCOSE: 294 MG/DL (ref 74–99)
PDW BLD-RTO: 12.1 FL (ref 11.5–15)
PLATELET # BLD: 266 E9/L (ref 130–450)
PMV BLD AUTO: 8.8 FL (ref 7–12)
POTASSIUM SERPL-SCNC: 4.6 MMOL/L (ref 3.5–5)
RBC # BLD: 4.69 E12/L (ref 3.8–5.8)
SODIUM BLD-SCNC: 139 MMOL/L (ref 132–146)
WBC # BLD: 9 E9/L (ref 4.5–11.5)

## 2022-08-27 PROCEDURE — 97165 OT EVAL LOW COMPLEX 30 MIN: CPT

## 2022-08-27 PROCEDURE — 97530 THERAPEUTIC ACTIVITIES: CPT

## 2022-08-27 PROCEDURE — 82962 GLUCOSE BLOOD TEST: CPT

## 2022-08-27 PROCEDURE — 2580000003 HC RX 258: Performed by: NEUROLOGICAL SURGERY

## 2022-08-27 PROCEDURE — 99024 POSTOP FOLLOW-UP VISIT: CPT | Performed by: NEUROLOGICAL SURGERY

## 2022-08-27 PROCEDURE — 85027 COMPLETE CBC AUTOMATED: CPT

## 2022-08-27 PROCEDURE — G0378 HOSPITAL OBSERVATION PER HR: HCPCS

## 2022-08-27 PROCEDURE — 36415 COLL VENOUS BLD VENIPUNCTURE: CPT

## 2022-08-27 PROCEDURE — 96374 THER/PROPH/DIAG INJ IV PUSH: CPT

## 2022-08-27 PROCEDURE — 2700000000 HC OXYGEN THERAPY PER DAY

## 2022-08-27 PROCEDURE — 80048 BASIC METABOLIC PNL TOTAL CA: CPT

## 2022-08-27 PROCEDURE — 6370000000 HC RX 637 (ALT 250 FOR IP): Performed by: NEUROLOGICAL SURGERY

## 2022-08-27 PROCEDURE — 6360000002 HC RX W HCPCS: Performed by: NEUROLOGICAL SURGERY

## 2022-08-27 PROCEDURE — 97161 PT EVAL LOW COMPLEX 20 MIN: CPT

## 2022-08-27 RX ADMIN — OXYCODONE HYDROCHLORIDE 10 MG: 10 TABLET ORAL at 18:32

## 2022-08-27 RX ADMIN — MORPHINE SULFATE 4 MG: 2 INJECTION, SOLUTION INTRAMUSCULAR; INTRAVENOUS at 20:00

## 2022-08-27 RX ADMIN — CYCLOBENZAPRINE 10 MG: 10 TABLET, FILM COATED ORAL at 21:31

## 2022-08-27 RX ADMIN — CEFAZOLIN 2000 MG: 2 INJECTION, POWDER, FOR SOLUTION INTRAMUSCULAR; INTRAVENOUS at 10:25

## 2022-08-27 RX ADMIN — DOCUSATE SODIUM 100 MG: 100 CAPSULE, LIQUID FILLED ORAL at 10:24

## 2022-08-27 RX ADMIN — OXYCODONE HYDROCHLORIDE 10 MG: 10 TABLET ORAL at 23:31

## 2022-08-27 RX ADMIN — ACETAMINOPHEN 650 MG: 325 TABLET, FILM COATED ORAL at 16:00

## 2022-08-27 RX ADMIN — POLYETHYLENE GLYCOL 3350 17 G: 17 POWDER, FOR SOLUTION ORAL at 10:25

## 2022-08-27 RX ADMIN — METFORMIN HYDROCHLORIDE 1000 MG: 1000 TABLET ORAL at 17:52

## 2022-08-27 RX ADMIN — INSULIN LISPRO 2 UNITS: 100 INJECTION, SOLUTION INTRAVENOUS; SUBCUTANEOUS at 17:52

## 2022-08-27 RX ADMIN — SODIUM CHLORIDE: 9 INJECTION, SOLUTION INTRAVENOUS at 05:15

## 2022-08-27 RX ADMIN — CYCLOBENZAPRINE 10 MG: 10 TABLET, FILM COATED ORAL at 14:18

## 2022-08-27 RX ADMIN — CEFAZOLIN 2000 MG: 2 INJECTION, POWDER, FOR SOLUTION INTRAMUSCULAR; INTRAVENOUS at 17:52

## 2022-08-27 RX ADMIN — BISACODYL 5 MG: 5 TABLET, COATED ORAL at 10:24

## 2022-08-27 RX ADMIN — OXYCODONE HYDROCHLORIDE 10 MG: 10 TABLET ORAL at 05:21

## 2022-08-27 RX ADMIN — METFORMIN HYDROCHLORIDE 1000 MG: 1000 TABLET ORAL at 09:06

## 2022-08-27 RX ADMIN — INSULIN LISPRO 2 UNITS: 100 INJECTION, SOLUTION INTRAVENOUS; SUBCUTANEOUS at 09:07

## 2022-08-27 RX ADMIN — OXYCODONE HYDROCHLORIDE 10 MG: 10 TABLET ORAL at 14:17

## 2022-08-27 RX ADMIN — ACETAMINOPHEN 650 MG: 325 TABLET, FILM COATED ORAL at 21:31

## 2022-08-27 RX ADMIN — SODIUM CHLORIDE, PRESERVATIVE FREE 10 ML: 5 INJECTION INTRAVENOUS at 10:24

## 2022-08-27 RX ADMIN — MORPHINE SULFATE 4 MG: 2 INJECTION, SOLUTION INTRAMUSCULAR; INTRAVENOUS at 03:10

## 2022-08-27 RX ADMIN — PRAVASTATIN SODIUM 40 MG: 20 TABLET ORAL at 10:25

## 2022-08-27 RX ADMIN — CEFAZOLIN 2000 MG: 2 INJECTION, POWDER, FOR SOLUTION INTRAMUSCULAR; INTRAVENOUS at 00:44

## 2022-08-27 RX ADMIN — MORPHINE SULFATE 4 MG: 2 INJECTION, SOLUTION INTRAMUSCULAR; INTRAVENOUS at 22:05

## 2022-08-27 RX ADMIN — MORPHINE SULFATE 4 MG: 2 INJECTION, SOLUTION INTRAMUSCULAR; INTRAVENOUS at 16:01

## 2022-08-27 RX ADMIN — DOCUSATE SODIUM 50 MG AND SENNOSIDES 8.6 MG 1 TABLET: 8.6; 5 TABLET, FILM COATED ORAL at 10:24

## 2022-08-27 RX ADMIN — DOCUSATE SODIUM 50 MG AND SENNOSIDES 8.6 MG 1 TABLET: 8.6; 5 TABLET, FILM COATED ORAL at 20:00

## 2022-08-27 RX ADMIN — SODIUM CHLORIDE, PRESERVATIVE FREE 10 ML: 5 INJECTION INTRAVENOUS at 20:00

## 2022-08-27 RX ADMIN — AMLODIPINE BESYLATE 10 MG: 10 TABLET ORAL at 10:25

## 2022-08-27 RX ADMIN — MORPHINE SULFATE 4 MG: 2 INJECTION, SOLUTION INTRAMUSCULAR; INTRAVENOUS at 07:02

## 2022-08-27 RX ADMIN — MORPHINE SULFATE 4 MG: 2 INJECTION, SOLUTION INTRAMUSCULAR; INTRAVENOUS at 10:14

## 2022-08-27 RX ADMIN — LOSARTAN POTASSIUM 25 MG: 25 TABLET, FILM COATED ORAL at 10:25

## 2022-08-27 RX ADMIN — ACETAMINOPHEN 650 MG: 325 TABLET, FILM COATED ORAL at 10:23

## 2022-08-27 RX ADMIN — ACETAMINOPHEN 650 MG: 325 TABLET, FILM COATED ORAL at 03:09

## 2022-08-27 RX ADMIN — OXYCODONE HYDROCHLORIDE 10 MG: 10 TABLET ORAL at 00:44

## 2022-08-27 RX ADMIN — INSULIN LISPRO 4 UNITS: 100 INJECTION, SOLUTION INTRAVENOUS; SUBCUTANEOUS at 13:29

## 2022-08-27 ASSESSMENT — PAIN SCALES - GENERAL
PAINLEVEL_OUTOF10: 9
PAINLEVEL_OUTOF10: 10
PAINLEVEL_OUTOF10: 8
PAINLEVEL_OUTOF10: 9
PAINLEVEL_OUTOF10: 8
PAINLEVEL_OUTOF10: 9
PAINLEVEL_OUTOF10: 10
PAINLEVEL_OUTOF10: 10
PAINLEVEL_OUTOF10: 9

## 2022-08-27 ASSESSMENT — PAIN DESCRIPTION - FREQUENCY: FREQUENCY: CONTINUOUS

## 2022-08-27 ASSESSMENT — PAIN DESCRIPTION - LOCATION
LOCATION: BACK

## 2022-08-27 ASSESSMENT — PAIN DESCRIPTION - PAIN TYPE: TYPE: SURGICAL PAIN

## 2022-08-27 ASSESSMENT — PAIN - FUNCTIONAL ASSESSMENT
PAIN_FUNCTIONAL_ASSESSMENT: PREVENTS OR INTERFERES SOME ACTIVE ACTIVITIES AND ADLS

## 2022-08-27 ASSESSMENT — PAIN DESCRIPTION - ONSET: ONSET: ON-GOING

## 2022-08-27 ASSESSMENT — PAIN DESCRIPTION - DESCRIPTORS
DESCRIPTORS: DISCOMFORT;STABBING;TENDER
DESCRIPTORS: DISCOMFORT;DULL;TENDER
DESCRIPTORS: DISCOMFORT;TENDER;SHARP
DESCRIPTORS: DISCOMFORT;DULL;ACHING
DESCRIPTORS: ACHING;DISCOMFORT;DULL;TENDER
DESCRIPTORS: DISCOMFORT;SORE;SHOOTING
DESCRIPTORS: DISCOMFORT;TENDER;SORE
DESCRIPTORS: DISCOMFORT;TENDER;ACHING
DESCRIPTORS: DISCOMFORT;SHARP;SORE
DESCRIPTORS: DISCOMFORT;STABBING;SHARP
DESCRIPTORS: DISCOMFORT;SHOOTING;TENDER
DESCRIPTORS: ACHING;DISCOMFORT;DULL

## 2022-08-27 ASSESSMENT — PAIN DESCRIPTION - ORIENTATION
ORIENTATION: LOWER;POSTERIOR

## 2022-08-27 NOTE — PROGRESS NOTES
Wife called unit and updated   She states Dr. Marguerite Aranda spoke with her post procedure   patient has not yet arrived to pacu from surgery

## 2022-08-27 NOTE — PROGRESS NOTES
Physical Therapy  Physical Therapy Initial Assessment     Name: Ezequiel Mandel  : 1965  MRN: [id number]      Date of Service: 2022    Evaluating PT:  Mookeben Cortes PT, DPT  [id number]    Room #:  8586/2803-Y  Diagnosis:  Herniated lumbar intervertebral disc [M51.26]  Spinal stenosis of lumbar region, unspecified whether neurogenic claudication present [M48.061]  Lumbar stenosis with neurogenic claudication [M48.062]  PMHx/PSHx:   has a past medical history of Diabetes mellitus ([facility] [address] Procedure/Surgery:  1. Bilateral L4, L5, and S1 laminectomy with bilateral L4-L5 and L5-S1  medial facetectomy, and bilateral L4, L5, and S1 foraminotomy. 2.  Right-sided L5-S1 diskectomy. Precautions:  Spine, Falls  Equipment Needs:  Tall WW pending progression    SUBJECTIVE:    Pt lives with spouse in a 2 story home with 3 stairs to enter and single rail. Bed is on 2nd floor and bath is on 2nd floor. Pt ambulated with no device independently PTA. Equipment Owned:     OBJECTIVE:   Initial Evaluation  Date: 22 Treatment Short Term/ Long Term   Goals   AM-PAC 6 Clicks 68/12     Was pt agreeable to Eval/treatment? Yes     Does pt have pain? Mild  back pain     Bed Mobility  Rolling: SBA  Supine to sit: SBA  Sit to supine: NT  Scooting: SBA  Rolling: Independent    Supine to sit:  Independent    Sit to supine: Independent    Scooting: Independent     Transfers Sit to stand: SBA  Stand to sit: SBA  Stand pivot: SBA [employer]  Sit to stand: Modified Independent    Stand to sit: Modified Independent    Stand pivot: Modified Independent     Ambulation    150 feet with SBA WW  >300 feet with Modified Independent      Stair negotiation: ascended and descended  NT  >4 steps with single rail Modified Independent     ROM BUE:  Defer to OT  BLE:  WFL     Strength BUE:  Defer to OT  BLE:  4/5  Improve 1 MMT   Balance Sitting EOB:  SBA  Dynamic Standing:  SBA [employer]  Sitting EOB:  Independent    Dynamic Standing:  Modified Independent       Pt is A & O x 4  Sensation:  WNL  Edema: WNL    Vitals:  HR 91  Spo2 96% RA   ACTIVITY  /70   ACTIVITY        Therapeutic Exercises:  functional mobility    Patient education  Pt educated on role of PT    Patient response to education:   Pt verbalized understanding Pt demonstrated skill Pt requires further education in this area   x x x     ASSESSMENT:    Conditions Requiring Skilled Therapeutic Intervention:    [x]Decreased strength     [x]Decreased ROM  [x]Decreased functional mobility  [x]Decreased balance   [x]Decreased endurance   []Decreased posture  []Decreased sensation  []Decreased coordination   []Decreased vision  []Decreased safety awareness   [x]Increased pain       Comments:  Pt agreeable to PT evaluation. Pt educated on precautions. Pt performing bed mobility via log roll. Pt performing sit to stand transfer with verbal cues for hand placement. Pt ambulating with reciprocal gait at slow speed. Patient would benefit from continued skilled PT to maximize functional mobility independence. Treatment:  Patient practiced and was instructed in the following treatment:    Bed mobility- verbal cues to perform via log roll  Functional transfers- Verbal cues for proper positioning and sequencing to perform transfers safely with maximum independence. Gait training- Verbal cues for proper positioning and sequencing using assistive device to maximize functional mobility independence. Pt's/ family goals   1. Get better    Prognosis is good for reaching above PT goals. Patient and or family understand(s) diagnosis, prognosis, and plan of care.   yes    PHYSICAL THERAPY PLAN OF CARE:    PT POC is established based on physician order and patient diagnosis     Referring provider/PT Order:    08/26/22 2115  PT eval and treat  Start:  08/26/22 2115,   End:  08/26/22 2115,   ONE TIME,   Standing Count:  1 Occurrences,   R         Last John MD     Diagnosis:  Herniated lumbar intervertebral disc [M51.26]  Spinal stenosis of lumbar region, unspecified whether neurogenic claudication present [M48.061]  Lumbar stenosis with neurogenic claudication [M48.062]  Specific instructions for next treatment:  Gait training    Current Treatment Recommendations:     [x] Strengthening to improve independence with functional mobility   [x] ROM to improve independence with functional mobility   [x] Balance Training to improve static/dynamic balance and to reduce fall risk  [x] Endurance Training to improve activity tolerance during functional mobility   [x] Transfer Training to improve safety and independence with all functional transfers   [x] Gait Training to improve gait mechanics, endurance and assess need for appropriate assistive device  [x] Stair Training in preparation for safe discharge home and/or into the community   [x] Positioning to prevent skin breakdown and contractures  [x] Safety and Education Training   [x] Patient/Caregiver Education   [x] HEP  [] Other     PT long term treatment goals are located in above grid    Frequency of treatments: 5-7x/week x 1-2 weeks. Time in  0755  Time out  0730    Total Treatment Time   minutes     Evaluation Time includes thorough review of current medical information, gathering information on past medical history/social history and prior level of function, completion of standardized testing/informal observation of tasks, assessment of data and education on plan of care and goals.     CPT codes:  [x] Low Complexity PT evaluation 98480  [] Moderate Complexity PT evaluation 42250  [] High Complexity PT evaluation 61138  [] PT Re-evaluation 17696  [] Gait training 81766 0 minutes  [] Manual therapy 14732 0 minutes  [] Therapeutic activities 24752 0 minutes  [] Therapeutic exercises 36615 0 minutes  [] Neuromuscular reeducation 51389 0 minutes       Brandi Moreau PT, DPT   [id number] Never smoker

## 2022-08-27 NOTE — OP NOTE
[address]                  [address] [facility]  [facility]                                OPERATIVE REPORT    PATIENT NAME: Haily Tinajero                        :        1965  MED REC NO:   [id number]                            ROOM:       5213  ACCOUNT NO:   [de-identified]                           ADMIT DATE: 2022  PROVIDER:     Nga Vogel MD    DATE OF PROCEDURE:  2022    PREOPERATIVE DIAGNOSES:  1. Lumbar canal stenosis from L4 to S1.  2.  Right-sided L5-S1 herniated nucleus pulposus. POSTOPERATIVE DIAGNOSES:  1. Lumbar canal stenosis from L4 to S1.  2.  Right-sided L5-S1 herniated nucleus pulposus. OPERATIVE PROCEDURES:  1.  Bilateral L4, L5, and S1 laminectomy with bilateral L4-L5 and L5-S1  medial facetectomy, and bilateral L4, L5, and S1 foraminotomy. 2.  Right-sided L5-S1 diskectomy. 3.  AS modifier for Magdalena Sosa PA-C, who assisted with primary  exposure and primary closure. ANESTHESIA:  Generalized endotracheal anesthesia. SURGEON:  Nga Vogel MD    ASSISTANT:  Magdalena Sosa PA-C    COMPLICATIONS:  None. ESTIMATED BLOOD LOSS:  50 mL. SPECIMEN:  Disk. OPERATIVE INDICATIONS:  The patient is a 79-year-old gentleman who  presented to the office complaining of back pain that radiated into his  legs. He had an MRI that showed that he had stenosis from L4 to S1 and  herniated disk at L5-S1. He had failed conservative therapy and after  risks, benefits, and alternatives were discussed with the patient, it  was determined that he would undergo the above-listed procedure. Of  note, Magdalena Sosa PA-C's services were required as she was the only  qualified assistant to assist with primary exposure and primary closure. DESCRIPTION OF OPERATIVE PROCEDURE:  The patient was brought into the  operating room.   A time-out was performed where he was identified by his  name, medical record number, and the operative procedure which he was  about to undergo. Next, induction of generalized endotracheal  anesthesia was then commenced. Upon completion of induction of  generalized endotracheal anesthesia, he received preoperative  antibiotics. He was then flipped in prone position on a Dennis table. All pressure points were padded. His lumbosacral region was prepped and  draped in the usual sterile fashion. After this was done, #10 blade was  used to make a skin incision. Monopolar cautery was used to dissect  through subcutaneous tissue. I placed self-retaining Weitlaner  retractor into the wound. Next, I opened up the lumbodorsal fascia  sharply with monopolar cautery and exposed the spinous processes at L4,  L5, and S1. I used intraoperative fluoroscopy to confirm I was at the  appropriate level. I then proceeded to perform a subperiosteal  dissection to expose bilateral lamina at L4, L5, and S1. I placed  self-retaining angled cerebellar retractors into the wound. I used a  Leksell rongeur to bite up the spinous process at L4, L5, and S1. I  used a high-speed pam to thin out the lamina bilaterally at L4, L5, and  S1. Once this was completed, I used a small straight curette to detach  the ligamentum flavum from the undersurface of the L5 lamina and  superior aspect of the S1 lamina. I then used #4 Kerrison punch to  start my central decompression. I performed bilateral L4, L5, and S1  laminectomy. I proceeded to then use #3 Kerrison punch to focus my  attention to lateral recesses by performing bilateral L4-L5 and L5-S1  medial facetectomy and bilateral L4, L5, and S1 foraminotomy. After  this was done, I identified the L5-S1 disk space on the right, retracted  thecal sac medially. I then proceeded to then identify free fragment of  disk that was removed with pituitary rongeurs. Once this was completed,  I inspected the nerve root and thecal sac.   They were pulsatile with  good color and I subsequently obtained adequate hemostasis with  monopolar and bipolar cautery. I irrigated the wound copiously with  antibiotic-impregnated saline. I then proceeded to close the wound in  layers using 0 Vicryl for the fascia, 2-0 Vicryl for the subcutaneous  layer and 4-0 Monocryl in a subcuticular fashion for the skin. Dermabond was applied over the skin surface. A dry sterile dressing was  placed over this. The patient was then flipped into supine position on  his hospital bed, was extubated, and transported to the postanesthesia  care unit in stable condition. There were no complications. Counts  were correct. I was present for the entire case.         Ye Schwartz MD    D: 08/26/2022 19:45:38       T: 08/26/2022 19:47:59     [email or web address]  Job#: [id number]     Doc#: [id number]    CC:

## 2022-08-27 NOTE — PROGRESS NOTES
Department of Neurosurgery  Progress Note    CHIEF COMPLAINT: POD #1 s/p L4-S1 laminectomy for decompression and L5-S1 discectomy. SUBJECTIVE:  Tolerating PO.  - BM. Pleased with surgical result as leg pain resolved. REVIEW OF SYSTEMS :  Constitutional: Negative for chills and fever. Neurological: Negative for dizziness, tremors and speech change. OBJECTIVE:   VITALS:  /85   Pulse 86   Temp 97.7 °F (36.5 °C) (Temporal)   Resp 18   Ht 6' 3\" (1.905 m)   Wt 240 lb (108.9 kg)   SpO2 97%   BMI 30.00 kg/m²     PHYSICAL:  AAO x4, rationally conversant. Algis Broaden   Speech clear  Face symmetric GI/FT  Tongue MDL  SS symm and strong  DC-C, preserved power  Incision: c/d/d    DATA:  CBC:   Lab Results   Component Value Date/Time    WBC 9.0 08/27/2022 05:34 AM    RBC 4.69 08/27/2022 05:34 AM    HGB 13.8 08/27/2022 05:34 AM    HCT 39.4 08/27/2022 05:34 AM    MCV 84.0 08/27/2022 05:34 AM    MCH 29.4 08/27/2022 05:34 AM    MCHC 35.0 08/27/2022 05:34 AM    RDW 12.1 08/27/2022 05:34 AM     08/27/2022 05:34 AM    MPV 8.8 08/27/2022 05:34 AM     BMP:    Lab Results   Component Value Date/Time     08/27/2022 05:34 AM    K 4.6 08/27/2022 05:34 AM    K 4.2 08/19/2022 09:40 AM     08/27/2022 05:34 AM    CO2 26 08/27/2022 05:34 AM    BUN 17 08/27/2022 05:34 AM    LABALBU 3.5 11/13/2017 09:18 AM    CREATININE 1.0 08/27/2022 05:34 AM    CALCIUM 9.0 08/27/2022 05:34 AM    GFRAA >60 08/27/2022 05:34 AM    LABGLOM >60 08/27/2022 05:34 AM    LABGLOM >60 11/13/2017 09:18 AM    GLUCOSE 262 08/27/2022 05:34 AM    GLUCOSE 312 11/13/2017 09:18 AM     PT/INR:    Lab Results   Component Value Date/Time    PROTIME 11.4 08/19/2022 09:40 AM    INR 1.0 08/19/2022 09:40 AM     PTT:  No results found for: APTT, PTT[APTT}    Current Inpatient Medications  Current Facility-Administered Medications: dextrose 10 % infusion, , IntraVENous, Continuous  amLODIPine (NORVASC) tablet 10 mg, 10 mg, Oral, Daily  losartan (COZAAR) tablet 25 mg, 25 mg, Oral, Daily  metFORMIN (GLUCOPHAGE) tablet 1,000 mg, 1,000 mg, Oral, BID WC  pravastatin (PRAVACHOL) tablet 40 mg, 40 mg, Oral, Daily  sodium chloride flush 0.9 % injection 5-40 mL, 5-40 mL, IntraVENous, 2 times per day  sodium chloride flush 0.9 % injection 5-40 mL, 5-40 mL, IntraVENous, PRN  0.9 % sodium chloride infusion, , IntraVENous, PRN  acetaminophen (TYLENOL) tablet 650 mg, 650 mg, Oral, Q6H  ondansetron (ZOFRAN-ODT) disintegrating tablet 4 mg, 4 mg, Oral, Q8H PRN **OR** ondansetron (ZOFRAN) injection 4 mg, 4 mg, IntraVENous, Q6H PRN  0.9 % sodium chloride infusion, , IntraVENous, Continuous  ceFAZolin (ANCEF) 2,000 mg in sterile water 20 mL IV syringe, 2,000 mg, IntraVENous, Q8H  oxyCODONE (ROXICODONE) immediate release tablet 5 mg, 5 mg, Oral, Q4H PRN **OR** oxyCODONE HCl (OXY-IR) immediate release tablet 10 mg, 10 mg, Oral, Q4H PRN  morphine (PF) injection 2 mg, 2 mg, IntraVENous, Q2H PRN **OR** morphine (PF) injection 4 mg, 4 mg, IntraVENous, Q2H PRN  cyclobenzaprine (FLEXERIL) tablet 10 mg, 10 mg, Oral, TID PRN  diphenhydrAMINE (BENADRYL) tablet 25 mg, 25 mg, Oral, Q6H PRN **OR** diphenhydrAMINE (BENADRYL) injection 25 mg, 25 mg, IntraVENous, Q6H PRN  polyethylene glycol (GLYCOLAX) packet 17 g, 17 g, Oral, Daily  bisacodyl (DULCOLAX) EC tablet 5 mg, 5 mg, Oral, Daily  sennosides-docusate sodium (SENOKOT-S) 8.6-50 MG tablet 1 tablet, 1 tablet, Oral, BID  bisacodyl (DULCOLAX) suppository 10 mg, 10 mg, Rectal, Daily PRN  fleet rectal enema 1 enema, 1 enema, Rectal, Daily PRN  benzocaine-menthol (CEPACOL SORE THROAT) lozenge 1 lozenge, 1 lozenge, Oral, Q2H PRN  docusate sodium (COLACE) capsule 100 mg, 100 mg, Oral, Daily  insulin lispro (HUMALOG) injection vial 0-8 Units, 0-8 Units, SubCUTAneous, TID WC  insulin lispro (HUMALOG) injection vial 0-4 Units, 0-4 Units, SubCUTAneous, Nightly  glucose chewable tablet 16 g, 4 tablet, Oral, PRN  dextrose bolus 10% 125 mL, 125 mL, IntraVENous,

## 2022-08-27 NOTE — PROGRESS NOTES
[address]  [address] [place] [address]      Date:2022                 Patient Name: Gladis Nino  MRN: [id number]  : 1965  Room: [address] [place] [address]    Referring Provider: Luke Robert MD  Specific Provider Orders/Date: OT evaluation and treat 22    Evaluating OT: Charmaine Patricia OTR/L #[id number]    Diagnosis: Herniated lumbar intervertebral disc [M51.26]  Spinal stenosis of lumbar region, unspecified whether neurogenic claudication present [M48.061]  Lumbar stenosis with neurogenic claudication [M48.062]      Surgery: s/p  L4-S1 laminectomy for decompression and L5-S1 discectomy. Pertinent Medical History:  has a past medical history of Diabetes mellitus ([facility] [address]      Precautions:  Fall Risk,  spinal neutral mechanics    Assessment of current deficits   [x] Functional mobility  [x]ADLs  [] Strength               []Cognition   [x] Functional transfers   [x] IADLs         [x] Safety Awareness   [x]Endurance   [] Fine Coordination              [] Balance      [] Vision/perception   []Sensation    []Gross Motor Coordination  [] ROM  [] Delirium                   [] Motor Control     OT PLAN OF CARE   OT POC based on physician orders, patient diagnosis and results of clinical assessment    Frequency/Duration   1-3  days/wk for 1 week PRN   Specific OT Treatment Interventions to include:   * Instruction/training on adapted ADL techniques and AE recommendations to increase functional independence within precautions       * Training on energy conservation strategies, correct breathing pattern and techniques to improve independence/tolerance for self-care routine  * Functional transfer/mobility training/DME recommendations for increased independence, safety, and fall prevention  * Patient/Family education to increase follow through with safety techniques and functional independence  * Recommendation of environmental modifications for increased safety with functional transfers/mobility and ADLs  * Therapeutic activities to facilitate/challenge dynamic balance, stand tolerance for increased safety and independence with ADLs  * Therapeutic activities to facilitate gross/fine motor skills for increased independence with ADLs  * Positioning to improve skin integrity, interaction with environment and functional independence    Recommended Adaptive Equipment: possible AE for LE dressing and bathing, elevated commode    Home Living: Pt lives with spouse in a 2 story home with 3 steps and one hand rails. Bed and bath on second  floor with full flight of  steps and one HR's.   Bathroom setup: tub shower with walk in basement and standard commode   Equipment owned: reacher    Prior Level of Function: Independent with ADLs , Independent with IADLs; ambulated no AD   Driving: yes   Occupation: works in heavy equipment   Medication management: self  Leisure: enjoys [place] [employer]    Pain Level: 8/10 surgery site pain    Cognition: A&O: 4/4; Follows multi step directions   Memory:  good - recall of spinal precautions- handout issued   Sequencing:  good-   Problem solving:  good-   Judgement/safety:  good-     Functional Assessment:  AM-PAC Daily Activity Raw Score: 19/24   Initial Eval Status  Date: 8/27/22 Treatment Status  Date: STGs = LTGs  Time frame: 1-3 days   Feeding Independent     Grooming SBA standing at sink - educated on adapted grooming techniques  Mod I    UB Dressing Setup   Independent   LB Dressing Min A to don pants educated on reacher use- has one at home  Mod I    Bathing Simulated min A   Mod I standing with LHS   Toileting SBA   Mod I    Bed Mobility  Supine to sit: n/t up in chair   Sit to supine:  SBA log rolling  Supine to sit: mod I   Sit to supine: mod I    Functional Transfers Sit to stand SBA  Mod I    Functional Mobility SBA with ww household distance  Mod I with ww prn   Balance Sitting:     Static: good    Dynamic:SBA  Standing: SBA                                                                       Activity Tolerance Fair+ limted by pain  O2 RA WFL  Good for ADL completion   Visual/  Perceptual Glasses: yes reading Personeta         Safety Good-                                  Good      Hand Dominance L    AROM (PROM) Strength Additional Info:    RUE  WFL 5/5 good  and wfl FMC/dexterity noted during ADL tasks       LUE WFL 5/5 good  and wfl FMC/dexterity noted during ADL tasks     Hearing: WFL   Sensation:   No c/o numbness or tingling   Tone: WFL   Edema: none noted    Comments: Upon arrival patient sitting up in chair and agreeable to evaluation. Performed OT evaluation with education on spinal precautions and safety and adapted techniques for ADL completion. At end of session, patient supine in bed on R side with pillow between legs and  with call light and phone within reach, all lines and tubes intact. Nursing notified. Overall patient demonstrated min  decreased independence and safety during completion of ADL/functional transfer/mobility tasks. Pt would benefit from continued skilled OT to increase safety and independence with completion of ADL/IADL tasks for functional independence and quality of life.     Treatment: OT treatment provided this date includes:   Instruction/training on safety and adapted techniques for completion of ADLs: to increase Morrison in self care with AE/DME prn    Instruction/training on safe functional mobility/transfer techniques: with focus on safety, technique & precautions ww   Instruction/training on energy conservation/work simplification for completion of ADLs: techniques to increase Morrison with self care ADLs & iADLs, work simplification to improve endurance   Proper Positioning/Alignment: for optimal healing, skin integrity to prevent breakdown, decrease edema  Skilled monitoring of vitals: to include BP, spO2 & HR during session  Sitting/standing Balance/Tolerance- to increased balance & activity tolerance during ADLs as well as facilitate proper posture and/or positioning     Rehab Potential: Good  for established goals     Patient / Family Goal: home with assist as needed, pain decreasd      Patient and/or family were instructed on functional diagnosis, prognosis/goals and OT plan of care. Demonstrated good understanding. Eval Complexity: low    Time In: 8:42  Time Out: 9:05  Total Treatment Time: 8 min. Min Units   OT Eval Low 99994  X     OT Eval Medium 37855      OT Eval High [id number]       OT Re-Eval [id number]       Therapeutic Ex [facility]       Therapeutic Activities 93378  8  1   ADL/Self Care 21167       Orthotic Management 72733       Neuro Re-Ed 20094       Non-Billable Time          Evaluation Time includes thorough review of current medical information, gathering information on past medical history/social history and prior level of function, completion of standardized testing/informal observation of tasks, assessment of data and education on plan of care and goals. Jerilyn Brantley.  [address], [address]

## 2022-08-27 NOTE — PLAN OF CARE
Problem: Discharge Planning  Goal: Discharge to home or other facility with appropriate resources  Outcome: Progressing     Problem: Chronic Conditions and Co-morbidities  Goal: Patient's chronic conditions and co-morbidity symptoms are monitored and maintained or improved  Outcome: Progressing     Problem: Pain  Goal: Verbalizes/displays adequate comfort level or baseline comfort level  Outcome: Completed     Problem: ABCDS Injury Assessment  Goal: Absence of physical injury  Outcome: Completed     Problem: Safety - Adult  Goal: Free from fall injury  Outcome: Completed

## 2022-08-27 NOTE — ANESTHESIA POSTPROCEDURE EVALUATION
Department of Anesthesiology  Postprocedure Note    Patient: Ezequiel Mandel  MRN: [id number]  YOB: 1965  Date of evaluation: 8/26/2022      Procedure Summary     Date: 08/26/22 Room / Location: [facility] OR 06 / [facility]    Anesthesia Start: 1705 Anesthesia Stop: 1911    Procedure: L4-S1 LAMINECTOMY, RIGHT L5-S1 DISCECTOMY (Right: Spine Lumbar) Diagnosis:       Herniated lumbar intervertebral disc      Spinal stenosis of lumbar region, unspecified whether neurogenic claudication present      (RIGHT L5-S1 HERNIATED DISC, L4-S1 STENOSIS)    Surgeons: Maldonado Hodges MD Responsible Provider: Tino Lyn MD    Anesthesia Type: general ASA Status: 3          Anesthesia Type: No value filed.     Valentin Phase I: Valentin Score: 8    Valentin Phase II:        Anesthesia Post Evaluation    Patient location during evaluation: PACU  Patient participation: complete - patient participated  Level of consciousness: awake and alert  Airway patency: patent  Nausea & Vomiting: no nausea and no vomiting  Complications: no  Cardiovascular status: hemodynamically stable  Respiratory status: acceptable  Hydration status: euvolemic  Multimodal analgesia pain management approach

## 2022-08-28 LAB
METER GLUCOSE: 235 MG/DL (ref 74–99)
METER GLUCOSE: 267 MG/DL (ref 74–99)
METER GLUCOSE: 270 MG/DL (ref 74–99)
METER GLUCOSE: 294 MG/DL (ref 74–99)

## 2022-08-28 PROCEDURE — 82962 GLUCOSE BLOOD TEST: CPT

## 2022-08-28 PROCEDURE — 99024 POSTOP FOLLOW-UP VISIT: CPT | Performed by: NEUROLOGICAL SURGERY

## 2022-08-28 PROCEDURE — 6370000000 HC RX 637 (ALT 250 FOR IP): Performed by: NEUROLOGICAL SURGERY

## 2022-08-28 PROCEDURE — 96376 TX/PRO/DX INJ SAME DRUG ADON: CPT

## 2022-08-28 PROCEDURE — 1200000000 HC SEMI PRIVATE

## 2022-08-28 PROCEDURE — 2580000003 HC RX 258: Performed by: NEUROLOGICAL SURGERY

## 2022-08-28 PROCEDURE — 6360000002 HC RX W HCPCS: Performed by: NEUROLOGICAL SURGERY

## 2022-08-28 RX ADMIN — SODIUM CHLORIDE, PRESERVATIVE FREE 10 ML: 5 INJECTION INTRAVENOUS at 21:05

## 2022-08-28 RX ADMIN — POLYETHYLENE GLYCOL 3350 17 G: 17 POWDER, FOR SOLUTION ORAL at 09:36

## 2022-08-28 RX ADMIN — OXYCODONE HYDROCHLORIDE 10 MG: 10 TABLET ORAL at 05:29

## 2022-08-28 RX ADMIN — BISACODYL 10 MG: 10 SUPPOSITORY RECTAL at 15:10

## 2022-08-28 RX ADMIN — OXYCODONE HYDROCHLORIDE 10 MG: 10 TABLET ORAL at 21:04

## 2022-08-28 RX ADMIN — CEFAZOLIN 2000 MG: 2 INJECTION, POWDER, FOR SOLUTION INTRAMUSCULAR; INTRAVENOUS at 01:34

## 2022-08-28 RX ADMIN — ACETAMINOPHEN 650 MG: 325 TABLET, FILM COATED ORAL at 15:10

## 2022-08-28 RX ADMIN — AMLODIPINE BESYLATE 10 MG: 10 TABLET ORAL at 09:38

## 2022-08-28 RX ADMIN — METFORMIN HYDROCHLORIDE 1000 MG: 1000 TABLET ORAL at 18:40

## 2022-08-28 RX ADMIN — DOCUSATE SODIUM 100 MG: 100 CAPSULE, LIQUID FILLED ORAL at 09:37

## 2022-08-28 RX ADMIN — METFORMIN HYDROCHLORIDE 1000 MG: 1000 TABLET ORAL at 09:11

## 2022-08-28 RX ADMIN — ACETAMINOPHEN 650 MG: 325 TABLET, FILM COATED ORAL at 21:04

## 2022-08-28 RX ADMIN — INSULIN LISPRO 4 UNITS: 100 INJECTION, SOLUTION INTRAVENOUS; SUBCUTANEOUS at 13:14

## 2022-08-28 RX ADMIN — SODIUM CHLORIDE, PRESERVATIVE FREE 10 ML: 5 INJECTION INTRAVENOUS at 09:38

## 2022-08-28 RX ADMIN — CYCLOBENZAPRINE 10 MG: 10 TABLET, FILM COATED ORAL at 09:37

## 2022-08-28 RX ADMIN — MORPHINE SULFATE 4 MG: 2 INJECTION, SOLUTION INTRAMUSCULAR; INTRAVENOUS at 01:34

## 2022-08-28 RX ADMIN — INSULIN LISPRO 2 UNITS: 100 INJECTION, SOLUTION INTRAVENOUS; SUBCUTANEOUS at 09:12

## 2022-08-28 RX ADMIN — OXYCODONE HYDROCHLORIDE 10 MG: 10 TABLET ORAL at 09:37

## 2022-08-28 RX ADMIN — CEFAZOLIN 2000 MG: 2 INJECTION, POWDER, FOR SOLUTION INTRAMUSCULAR; INTRAVENOUS at 09:38

## 2022-08-28 RX ADMIN — DOCUSATE SODIUM 50 MG AND SENNOSIDES 8.6 MG 1 TABLET: 8.6; 5 TABLET, FILM COATED ORAL at 09:36

## 2022-08-28 RX ADMIN — OXYCODONE HYDROCHLORIDE 10 MG: 10 TABLET ORAL at 14:56

## 2022-08-28 RX ADMIN — PRAVASTATIN SODIUM 40 MG: 20 TABLET ORAL at 09:36

## 2022-08-28 RX ADMIN — DOCUSATE SODIUM 50 MG AND SENNOSIDES 8.6 MG 1 TABLET: 8.6; 5 TABLET, FILM COATED ORAL at 21:05

## 2022-08-28 RX ADMIN — CEFAZOLIN 2000 MG: 2 INJECTION, POWDER, FOR SOLUTION INTRAMUSCULAR; INTRAVENOUS at 18:40

## 2022-08-28 RX ADMIN — CYCLOBENZAPRINE 10 MG: 10 TABLET, FILM COATED ORAL at 22:25

## 2022-08-28 RX ADMIN — INSULIN LISPRO 4 UNITS: 100 INJECTION, SOLUTION INTRAVENOUS; SUBCUTANEOUS at 18:40

## 2022-08-28 RX ADMIN — ACETAMINOPHEN 650 MG: 325 TABLET, FILM COATED ORAL at 09:38

## 2022-08-28 RX ADMIN — LOSARTAN POTASSIUM 25 MG: 25 TABLET, FILM COATED ORAL at 09:36

## 2022-08-28 RX ADMIN — BISACODYL 5 MG: 5 TABLET, COATED ORAL at 09:38

## 2022-08-28 ASSESSMENT — PAIN DESCRIPTION - PAIN TYPE
TYPE: SURGICAL PAIN

## 2022-08-28 ASSESSMENT — PAIN DESCRIPTION - DESCRIPTORS
DESCRIPTORS: PRESSURE;SORE;DISCOMFORT
DESCRIPTORS: PRESSURE;DISCOMFORT;DULL
DESCRIPTORS: DISCOMFORT;TENDER;SHARP
DESCRIPTORS: DISCOMFORT;DULL;ACHING;SORE
DESCRIPTORS: PRESSURE;SORE;TENDER
DESCRIPTORS: DISCOMFORT;TENDER;STABBING

## 2022-08-28 ASSESSMENT — PAIN SCALES - GENERAL
PAINLEVEL_OUTOF10: 10
PAINLEVEL_OUTOF10: 3
PAINLEVEL_OUTOF10: 3
PAINLEVEL_OUTOF10: 5
PAINLEVEL_OUTOF10: 8
PAINLEVEL_OUTOF10: 7
PAINLEVEL_OUTOF10: 9
PAINLEVEL_OUTOF10: 8

## 2022-08-28 ASSESSMENT — PAIN DESCRIPTION - LOCATION
LOCATION: BACK
LOCATION: BACK;LEG
LOCATION: BACK;GROIN
LOCATION: BACK

## 2022-08-28 ASSESSMENT — PAIN DESCRIPTION - ORIENTATION
ORIENTATION: LOWER;POSTERIOR

## 2022-08-28 ASSESSMENT — PAIN - FUNCTIONAL ASSESSMENT
PAIN_FUNCTIONAL_ASSESSMENT: PREVENTS OR INTERFERES SOME ACTIVE ACTIVITIES AND ADLS

## 2022-08-28 ASSESSMENT — PAIN DESCRIPTION - FREQUENCY
FREQUENCY: CONTINUOUS

## 2022-08-28 ASSESSMENT — PAIN DESCRIPTION - ONSET
ONSET: ON-GOING

## 2022-08-28 NOTE — PROGRESS NOTES
Comprehensive Nutrition Assessment    Type and Reason for Visit:  Initial, Positive Nutrition Screen    Nutrition Recommendations/Plan:   Continue current diet. Recommend and start Ensure HP once daily and gelatein ONS once daily to optimize intake /post op healing. Malnutrition Assessment:  Malnutrition Status:  Insufficient data (08/28/22 1500)    Context:  Acute Illness     Findings of the 6 clinical characteristics of malnutrition:  Energy Intake:  Mild decrease in energy intake (Comment)  Weight Loss:  Unable to assess (2/2 lack of wt hx on file to assess)     Body Fat Loss:  No significant body fat loss     Muscle Mass Loss:  No significant muscle mass loss    Fluid Accumulation:  No significant fluid accumulation     Strength:  Not Performed    Nutrition Assessment:    Pt. admit severe right leg pain x 6 weeks. Lumbar MRI revealed left L4-5 and right L5-S1 disc herniation. now s/p L4-S1 laminectomy for decompression and L5-S1 discectomy. Hx of DM. Pt. currently consuming 50-75% of meals. Will start ONS to optimize post op healing and monitor. Nutrition Related Findings:    A&Ox4, +BS, +constipation (last BM 8/25/22), no edema, +I/O Wound Type: Surgical Incision (back)       Current Nutrition Intake & Therapies:    Average Meal Intake: 51-75%  Average Supplements Intake: None Ordered  ADULT DIET; Regular; 4 carb choices (60 gm/meal)    Anthropometric Measures:  Height: 6' 3\" (190.5 cm)  Ideal Body Weight (IBW): 196 lbs (89 kg)    Admission Body Weight: 240 lb (108.9 kg) (8/26 no method)  Current Body Weight: 240 lb (108.9 kg) (8/26 no method), 122.4 % IBW. Weight Source: Not Specified  Current BMI (kg/m2): 30  Usual Body Weight:  (CIRO d/t lack of wt hx on file to assess)     Weight Adjustment For: No Adjustment                 BMI Categories: Obese Class 1 (BMI 30.0-34. 9)    Estimated Daily Nutrient Needs:  Energy Requirements Based On: Formula  Weight Used for Energy Requirements: Current  Energy (kcal/day): 9232-1341 kcal (MSJ x1. 2SF)  Weight Used for Protein Requirements: Ideal  Protein (g/day): 133-160g (1.5-1.8g/kgIBW)  Method Used for Fluid Requirements: 1 ml/kcal  Fluid (ml/day): 2400-2500ml    Nutrition Diagnosis:   Increased nutrient needs related to increase demand for energy/nutrients (post op healing) as evidenced by wounds    Nutrition Interventions:   Food and/or Nutrient Delivery: Continue Current Diet, Start Oral Nutrition Supplement (Ensure HP once daily, Gelatein once daily)  Nutrition Education/Counseling: No recommendation at this time  Coordination of Nutrition Care: Continue to monitor while inpatient       Goals:     Goals: PO intake 75% or greater, by next RD assessment       Nutrition Monitoring and Evaluation:   Behavioral-Environmental Outcomes: None Identified  Food/Nutrient Intake Outcomes: Food and Nutrient Intake, Supplement Intake  Physical Signs/Symptoms Outcomes: Biochemical Data, Constipation, Fluid Status or Edema, Nutrition Focused Physical Findings, Skin, Weight, GI Status    Discharge Planning:     Too soon to determine     Cyrus Jimenez RD  Contact: ext [phone]

## 2022-08-28 NOTE — PROGRESS NOTES
Department of Neurosurgery  Progress Note    CHIEF COMPLAINT: POD #2 s/p L4-S1 laminectomy for decompression and L5-S1 discectomy. SUBJECTIVE:  Tolerating PO. C/o incisional pain, -BM    REVIEW OF SYSTEMS :  Constitutional: Negative for chills and fever. Neurological: Negative for dizziness, tremors and speech change. OBJECTIVE:   VITALS:  BP (!) 168/98   Pulse 98   Temp 97.8 °F (36.6 °C) (Temporal)   Resp 16   Ht 6' 3\" (1.905 m)   Wt 240 lb (108.9 kg)   SpO2 93%   BMI 30.00 kg/m²     PHYSICAL:    AAO x4, rationally conversant. Bonifacio Power   Speech clear  Face symmetric GI/FT  Tongue MDL  SS symm and strong  DC-C, preserved power  Incision: c/d/i    DATA:  CBC:   Lab Results   Component Value Date/Time    WBC 9.0 08/27/2022 05:34 AM    RBC 4.69 08/27/2022 05:34 AM    HGB 13.8 08/27/2022 05:34 AM    HCT 39.4 08/27/2022 05:34 AM    MCV 84.0 08/27/2022 05:34 AM    MCH 29.4 08/27/2022 05:34 AM    MCHC 35.0 08/27/2022 05:34 AM    RDW 12.1 08/27/2022 05:34 AM     08/27/2022 05:34 AM    MPV 8.8 08/27/2022 05:34 AM     BMP:    Lab Results   Component Value Date/Time     08/27/2022 05:34 AM    K 4.6 08/27/2022 05:34 AM    K 4.2 08/19/2022 09:40 AM     08/27/2022 05:34 AM    CO2 26 08/27/2022 05:34 AM    BUN 17 08/27/2022 05:34 AM    LABALBU 3.5 11/13/2017 09:18 AM    CREATININE 1.0 08/27/2022 05:34 AM    CALCIUM 9.0 08/27/2022 05:34 AM    GFRAA >60 08/27/2022 05:34 AM    LABGLOM >60 08/27/2022 05:34 AM    LABGLOM >60 11/13/2017 09:18 AM    GLUCOSE 262 08/27/2022 05:34 AM    GLUCOSE 312 11/13/2017 09:18 AM     PT/INR:    Lab Results   Component Value Date/Time    PROTIME 11.4 08/19/2022 09:40 AM    INR 1.0 08/19/2022 09:40 AM     PTT:  No results found for: APTT, PTT[APTT}    Current Inpatient Medications  Current Facility-Administered Medications: dextrose 10 % infusion, , IntraVENous, Continuous  amLODIPine (NORVASC) tablet 10 mg, 10 mg, Oral, Daily  losartan (COZAAR) tablet 25 mg, 25 mg, Oral, Daily  metFORMIN (GLUCOPHAGE) tablet 1,000 mg, 1,000 mg, Oral, BID WC  pravastatin (PRAVACHOL) tablet 40 mg, 40 mg, Oral, Daily  sodium chloride flush 0.9 % injection 5-40 mL, 5-40 mL, IntraVENous, 2 times per day  sodium chloride flush 0.9 % injection 5-40 mL, 5-40 mL, IntraVENous, PRN  0.9 % sodium chloride infusion, , IntraVENous, PRN  acetaminophen (TYLENOL) tablet 650 mg, 650 mg, Oral, Q6H  ondansetron (ZOFRAN-ODT) disintegrating tablet 4 mg, 4 mg, Oral, Q8H PRN **OR** ondansetron (ZOFRAN) injection 4 mg, 4 mg, IntraVENous, Q6H PRN  0.9 % sodium chloride infusion, , IntraVENous, Continuous  ceFAZolin (ANCEF) 2,000 mg in sterile water 20 mL IV syringe, 2,000 mg, IntraVENous, Q8H  oxyCODONE (ROXICODONE) immediate release tablet 5 mg, 5 mg, Oral, Q4H PRN **OR** oxyCODONE HCl (OXY-IR) immediate release tablet 10 mg, 10 mg, Oral, Q4H PRN  morphine (PF) injection 2 mg, 2 mg, IntraVENous, Q2H PRN **OR** morphine (PF) injection 4 mg, 4 mg, IntraVENous, Q2H PRN  cyclobenzaprine (FLEXERIL) tablet 10 mg, 10 mg, Oral, TID PRN  diphenhydrAMINE (BENADRYL) tablet 25 mg, 25 mg, Oral, Q6H PRN **OR** diphenhydrAMINE (BENADRYL) injection 25 mg, 25 mg, IntraVENous, Q6H PRN  polyethylene glycol (GLYCOLAX) packet 17 g, 17 g, Oral, Daily  bisacodyl (DULCOLAX) EC tablet 5 mg, 5 mg, Oral, Daily  sennosides-docusate sodium (SENOKOT-S) 8.6-50 MG tablet 1 tablet, 1 tablet, Oral, BID  bisacodyl (DULCOLAX) suppository 10 mg, 10 mg, Rectal, Daily PRN  fleet rectal enema 1 enema, 1 enema, Rectal, Daily PRN  benzocaine-menthol (CEPACOL SORE THROAT) lozenge 1 lozenge, 1 lozenge, Oral, Q2H PRN  docusate sodium (COLACE) capsule 100 mg, 100 mg, Oral, Daily  insulin lispro (HUMALOG) injection vial 0-8 Units, 0-8 Units, SubCUTAneous, TID WC  insulin lispro (HUMALOG) injection vial 0-4 Units, 0-4 Units, SubCUTAneous, Nightly  glucose chewable tablet 16 g, 4 tablet, Oral, PRN  dextrose bolus 10% 125 mL, 125 mL, IntraVENous, PRN **OR** dextrose bolus 10% 250 mL, 250 mL, IntraVENous, PRN  glucagon (rDNA) injection 1 mg, 1 mg, SubCUTAneous, PRN  dextrose 10 % infusion, , IntraVENous, Continuous PRN    ASSESSMENT:   POD #2 s/p L4-S1 laminectomy for decompression and L5-S1 discectomy.     PLAN:  Mobilise with PT/OT  Bowel regimen: Dulcolax  Pain control  D/C planning    Thank you so much for allowing us to participate in the care of this patient      Electronically signed by Jonne Boast, MD on 8/28/2022 at 12:39 PM

## 2022-08-29 VITALS
HEART RATE: 93 BPM | TEMPERATURE: 98.4 F | RESPIRATION RATE: 16 BRPM | OXYGEN SATURATION: 94 % | DIASTOLIC BLOOD PRESSURE: 92 MMHG | SYSTOLIC BLOOD PRESSURE: 162 MMHG | WEIGHT: 240 LBS | BODY MASS INDEX: 29.84 KG/M2 | HEIGHT: 75 IN

## 2022-08-29 LAB
METER GLUCOSE: 258 MG/DL (ref 74–99)
METER GLUCOSE: 279 MG/DL (ref 74–99)

## 2022-08-29 PROCEDURE — 6360000002 HC RX W HCPCS: Performed by: NEUROLOGICAL SURGERY

## 2022-08-29 PROCEDURE — 6370000000 HC RX 637 (ALT 250 FOR IP): Performed by: NEUROLOGICAL SURGERY

## 2022-08-29 PROCEDURE — 97530 THERAPEUTIC ACTIVITIES: CPT

## 2022-08-29 PROCEDURE — 82962 GLUCOSE BLOOD TEST: CPT

## 2022-08-29 PROCEDURE — 2580000003 HC RX 258: Performed by: NEUROLOGICAL SURGERY

## 2022-08-29 RX ORDER — CYCLOBENZAPRINE HCL 10 MG
10 TABLET ORAL 3 TIMES DAILY PRN
Qty: 40 TABLET | Refills: 0 | Status: SHIPPED | OUTPATIENT
Start: 2022-08-29 | End: 2022-09-08

## 2022-08-29 RX ORDER — OXYCODONE HYDROCHLORIDE 5 MG/1
5 TABLET ORAL EVERY 4 HOURS PRN
Qty: 42 TABLET | Refills: 0 | Status: SHIPPED | OUTPATIENT
Start: 2022-08-29 | End: 2022-09-23 | Stop reason: SDUPTHER

## 2022-08-29 RX ADMIN — DOCUSATE SODIUM 50 MG AND SENNOSIDES 8.6 MG 1 TABLET: 8.6; 5 TABLET, FILM COATED ORAL at 09:08

## 2022-08-29 RX ADMIN — OXYCODONE HYDROCHLORIDE 10 MG: 10 TABLET ORAL at 09:09

## 2022-08-29 RX ADMIN — POLYETHYLENE GLYCOL 3350 17 G: 17 POWDER, FOR SOLUTION ORAL at 09:08

## 2022-08-29 RX ADMIN — CYCLOBENZAPRINE 10 MG: 10 TABLET, FILM COATED ORAL at 09:08

## 2022-08-29 RX ADMIN — PRAVASTATIN SODIUM 40 MG: 20 TABLET ORAL at 09:09

## 2022-08-29 RX ADMIN — SODIUM CHLORIDE, PRESERVATIVE FREE 10 ML: 5 INJECTION INTRAVENOUS at 09:15

## 2022-08-29 RX ADMIN — METFORMIN HYDROCHLORIDE 1000 MG: 1000 TABLET ORAL at 09:08

## 2022-08-29 RX ADMIN — BISACODYL 5 MG: 5 TABLET, COATED ORAL at 09:08

## 2022-08-29 RX ADMIN — OXYCODONE HYDROCHLORIDE 10 MG: 10 TABLET ORAL at 01:23

## 2022-08-29 RX ADMIN — LOSARTAN POTASSIUM 25 MG: 25 TABLET, FILM COATED ORAL at 09:09

## 2022-08-29 RX ADMIN — OXYCODONE HYDROCHLORIDE 10 MG: 10 TABLET ORAL at 13:13

## 2022-08-29 RX ADMIN — DOCUSATE SODIUM 100 MG: 100 CAPSULE, LIQUID FILLED ORAL at 09:08

## 2022-08-29 RX ADMIN — INSULIN LISPRO 4 UNITS: 100 INJECTION, SOLUTION INTRAVENOUS; SUBCUTANEOUS at 11:31

## 2022-08-29 RX ADMIN — INSULIN LISPRO 4 UNITS: 100 INJECTION, SOLUTION INTRAVENOUS; SUBCUTANEOUS at 09:15

## 2022-08-29 RX ADMIN — ACETAMINOPHEN 650 MG: 325 TABLET, FILM COATED ORAL at 09:25

## 2022-08-29 RX ADMIN — AMLODIPINE BESYLATE 10 MG: 10 TABLET ORAL at 09:09

## 2022-08-29 ASSESSMENT — PAIN DESCRIPTION - LOCATION
LOCATION: BACK

## 2022-08-29 ASSESSMENT — PAIN DESCRIPTION - DESCRIPTORS
DESCRIPTORS: SHARP;STABBING;DISCOMFORT
DESCRIPTORS: ACHING;DISCOMFORT;SHARP;SORE

## 2022-08-29 ASSESSMENT — PAIN - FUNCTIONAL ASSESSMENT
PAIN_FUNCTIONAL_ASSESSMENT: PREVENTS OR INTERFERES SOME ACTIVE ACTIVITIES AND ADLS
PAIN_FUNCTIONAL_ASSESSMENT: PREVENTS OR INTERFERES SOME ACTIVE ACTIVITIES AND ADLS

## 2022-08-29 ASSESSMENT — PAIN SCALES - GENERAL
PAINLEVEL_OUTOF10: 4
PAINLEVEL_OUTOF10: 8
PAINLEVEL_OUTOF10: 8
PAINLEVEL_OUTOF10: 7
PAINLEVEL_OUTOF10: 3

## 2022-08-29 ASSESSMENT — PAIN DESCRIPTION - ORIENTATION
ORIENTATION: POSTERIOR
ORIENTATION: POSTERIOR

## 2022-08-29 NOTE — PLAN OF CARE
Problem: Discharge Planning  Goal: Discharge to home or other facility with appropriate resources  8/29/2022 0948 by Villa Resendiz RN  Outcome: Completed  8/28/2022 2249 by Carmela Mehta RN  Outcome: Progressing  8/28/2022 1958 by Margo Murphy RN  Outcome: Progressing     Problem: Chronic Conditions and Co-morbidities  Goal: Patient's chronic conditions and co-morbidity symptoms are monitored and maintained or improved  8/29/2022 0948 by Villa Resendiz RN  Outcome: Completed  8/28/2022 2249 by Carmela Mehta RN  Outcome: Progressing  8/28/2022 1958 by Margo Murphy RN  Outcome: Progressing     Problem: Nutrition Deficit:  Goal: Optimize nutritional status  8/29/2022 0948 by Villa eRsendiz RN  Outcome: Completed  8/28/2022 2249 by Carmela Mehta RN  Outcome: Progressing  8/28/2022 1958 by Margo Murphy RN  Outcome: Progressing

## 2022-08-29 NOTE — DISCHARGE INSTRUCTIONS
Discharge Instructions    1. No lifting more than 10 pounds. 2. Refrain from bending, twisting, or turning at the waist.  3. No brace is needed to be worn. 4. Leave incision open to air. 5. All stitches are under the skin and will dissolve in time. 6. Patient may shower, do not soak or scrub at the incision site. 7. Refrain from driving and sexual activity for 1 month. 8. Follow-up in the office in 1 month, no films necessary. Nutrition Recommendations/Plan:   Continue current diet. Recommend and start Ensure HP once daily to optimize intake /post op healing. Lumbar Laminectomy: What to Expect at [address]  A lumbar laminectomy is surgery to ease pressure on the spinal cord and nerves of the lower spine. The doctor took out pieces of bone that were squeezing thespinal cord and nerves. You can expect your back to feel stiff or sore after surgery. This should improve in the weeks after surgery. You may have trouble sitting or standing in one position for very long and may need pain medicine in the weeks after yoursurgery. Your doctor may advise you to work with a physical therapist to strengthen the muscles around your spine and trunk. You will need to learn how to lift, twist,and bend so that you don't put too much strain on your back. This care sheet gives you a general idea about how long it will take for you to recover. But each person recovers at a different pace. Follow the steps belowto get better as quickly as possible. How can you care for yourself at home? Activity    Rest when you feel tired. Getting enough sleep will help you recover. Try to walk each day. Start by walking a little more than you did the day before. Bit by bit, increase the amount you walk. Walking boosts blood flow and helps prevent pneumonia and constipation. Walking may also decrease your muscle soreness after surgery.      If advised by your doctor, you may need to avoid lifting anything that would cause excessive strain on your back. This may include a child, heavy grocery bags and milk containers, a heavy briefcase or backpack, cat litter or dog food bags, or a vacuum . Avoid strenuous activities, such as bicycle riding, jogging, weight lifting, or aerobic exercise, until your doctor says it is okay. Do not drive for 2 to 4 weeks after your surgery or until your doctor says it is okay. Avoid riding in a car for more than 30 minutes at a time for 2 to 4 weeks after surgery. If you must ride in a car for a longer distance, stop often to walk and stretch your legs. Try to change your position about every 30 minutes while sitting or standing. This will help decrease your back pain while you are healing. You will probably need to take 4 to 6 weeks off from work. It depends on the type of work you do and how you feel. You may have sex as soon as you feel able, but avoid positions that put stress on your back or cause pain. Diet    You can eat your normal diet. If your stomach is upset, try bland, low-fat foods like plain rice, broiled chicken, toast, and yogurt. Drink plenty of fluids (unless your doctor tells you not to). You may notice that your bowel movements are not regular right after your surgery. This is common. Try to avoid constipation and straining with bowel movements. You may want to take a fiber supplement every day. If you have not had a bowel movement after a couple of days, ask your doctor about taking a mild laxative. Medicines    Your doctor will tell you if and when you can restart your medicines. He or she will also give you instructions about taking any new medicines. If you take aspirin or some other blood thinner, ask your doctor if and when to start taking it again. Make sure that you understand exactly what your doctor wants you to do. Take pain medicines exactly as directed.   If the doctor gave you a prescription medicine for pain, take it as prescribed. If you are not taking a prescription pain medicine, ask your doctor if you can take an over-the-counter medicine. If your doctor prescribed antibiotics, take them as directed. Do not stop taking them just because you feel better. You need to take the full course of antibiotics. If you think your pain medicine is making you sick to your stomach: Take your medicine after meals (unless your doctor has told you not to). Ask your doctor for a different pain medicine. Incision care    If you have strips of tape on the cut (incision) the doctor made, leave the tape on for a week or until it falls off. Wash the area daily with warm, soapy water and pat it dry. Keep the area clean and dry. You may cover it with a gauze bandage if it weeps or rubs against clothing. Change the bandage every day. Exercise    Do back exercises as instructed by your doctor. Your doctor may advise you to work with a physical therapist to improve the strength and flexibility of your back. Other instructions    To reduce stiffness and help sore muscles, use a warm water bottle, a heating pad set on low, or a warm cloth on your back. Do not put heat right over the incision. Do not go to sleep with a heating pad on your skin. Follow-up care is a key part of your treatment and safety. Be sure to make and go to all appointments, and call your doctor if you are having problems. It's also a good idea to know your test results and keep alist of the medicines you take. When should you call for help? Call 911 anytime you think you may need emergency care. For example, call if:    You passed out (lost consciousness). You have sudden chest pain and shortness of breath, or you cough up blood. You are unable to move a leg at all. Call your doctor now or seek immediate medical care if:    You have new or worse symptoms in your legs or buttocks.  Symptoms may include:  Numbness or tingling. Weakness. Pain. You lose bladder or bowel control. You have loose stitches, or your incision comes open. You have blood or fluid draining from the incision. You have signs of infection, such as: Increased pain, swelling, warmth, or redness. Pus draining from the incision. A fever. Red streaks leading from the incision. Watch closely for changes in your health, and be sure to contact your doctor if:    You do not have a bowel movement after taking a laxative. You are not getting better as expected. Where can you learn more? Go to [email or web address] org and sign in to your [employer] account. Enter G106 in the [facility] box to learn more about \"Lumbar Laminectomy: What to Expect at Home. \"     If you do not have an account, please click on the \"Sign Up Now\" link. Current as of: March 9, 2022               Content Version: 13.3  © 2006-2022 Healthwise, [facility]. Care instructions adapted under license by [facility] ([facility]). If you have questions about a medical condition or this instruction, always ask your healthcare professional. [place] [address] any warranty or liability for your use of this information.

## 2022-08-29 NOTE — DISCHARGE SUMMARY
Discharge Summary    [facility] SUMMARY:                The patient is a 49 y.o. male who was admitted to the hospital on 8/26/2022 10:41 AM for treatment of back pain. On the day of admission, a lumbar discectomy was performed. The patient's hospital course was uncomplicated and consisted of physical therapy, incision observation, and a return to normal oral intake. The patient was discharged on 8/29/2022  3:04 PM tolerating a diet, moving bowels, and urinating without difficulty. The incisions were clean and intact. The patient was discharged to home in satisfactory condition with instructions to call the office for a follow up appointment. Hospital Problem List:  Principal Problem:    Lumbar stenosis with neurogenic claudication  Resolved Problems:    * No resolved hospital problems. *     Procedure(s) (LRB):  L4-S1 LAMINECTOMY, RIGHT L5-S1 DISCECTOMY (Right)    Discharge Medications:      Medication List        START taking these medications      cyclobenzaprine 10 MG tablet  Commonly known as: FLEXERIL  Take 1 tablet by mouth 3 times daily as needed for Muscle spasms     oxyCODONE 5 MG immediate release tablet  Commonly known as: ROXICODONE  Take 1 tablet by mouth every 4 hours as needed for Pain for up to 7 days.             CONTINUE taking these medications      amLODIPine 10 MG tablet  Commonly known as: NORVASC     insulin aspart 100 UNIT/ML injection vial  Commonly known as: NOVOLOG     losartan 25 MG tablet  Commonly known as: COZAAR     metFORMIN 500 MG tablet  Commonly known as: GLUCOPHAGE     Ozempic (0.25 or 0.5 MG/DOSE) 2 MG/1.5ML Sopn  Generic drug: Semaglutide(0.25 or 0.5MG/DOS)     pravastatin 40 MG tablet  Commonly known as: PRAVACHOL     Tresiba 100 UNIT/ML Soln  Generic drug: Insulin Degludec               Where to Get Your Medications        These medications were sent to [facility] [address] [facility] [address]      Phone: [phone]   cyclobenzaprine 10 MG tablet  oxyCODONE 5 MG immediate release tablet         Nan Cisneros  8/29/2022

## 2022-08-29 NOTE — CARE COORDINATION
8/29/22 Transition of Care: Patient is POD 3 Laminectomy. He will be discharged to home today. He lives with his wife in a two story home with 3 steps to enter and a rail. He is independent. He was not using any devices prior to discharge. He is active with [employer]. He follows with [facility] and sees Dr Jam Emmanuel. [facility] Plan is for patient to discharge to home today.  Electronically signed by Meenakshi Zepeda RN CM on 8/29/2022 at 10:31 AM

## 2022-08-29 NOTE — PROGRESS NOTES
CLINICAL PHARMACY NOTE: MEDS TO BEDS    Total # of Prescriptions Filled: 2   The following medications were delivered to the patient:  Cyclobenzaprine 10 mg  Oxycodone 5 mg    Additional Documentation:     Delivered meds to patient @11:00

## 2022-08-29 NOTE — PROGRESS NOTES
Oral, Daily  sodium chloride flush 0.9 % injection 5-40 mL, 5-40 mL, IntraVENous, 2 times per day  sodium chloride flush 0.9 % injection 5-40 mL, 5-40 mL, IntraVENous, PRN  0.9 % sodium chloride infusion, , IntraVENous, PRN  acetaminophen (TYLENOL) tablet 650 mg, 650 mg, Oral, Q6H  ondansetron (ZOFRAN-ODT) disintegrating tablet 4 mg, 4 mg, Oral, Q8H PRN **OR** ondansetron (ZOFRAN) injection 4 mg, 4 mg, IntraVENous, Q6H PRN  0.9 % sodium chloride infusion, , IntraVENous, Continuous  oxyCODONE (ROXICODONE) immediate release tablet 5 mg, 5 mg, Oral, Q4H PRN **OR** oxyCODONE HCl (OXY-IR) immediate release tablet 10 mg, 10 mg, Oral, Q4H PRN  morphine (PF) injection 2 mg, 2 mg, IntraVENous, Q2H PRN **OR** morphine (PF) injection 4 mg, 4 mg, IntraVENous, Q2H PRN  cyclobenzaprine (FLEXERIL) tablet 10 mg, 10 mg, Oral, TID PRN  diphenhydrAMINE (BENADRYL) tablet 25 mg, 25 mg, Oral, Q6H PRN **OR** diphenhydrAMINE (BENADRYL) injection 25 mg, 25 mg, IntraVENous, Q6H PRN  polyethylene glycol (GLYCOLAX) packet 17 g, 17 g, Oral, Daily  bisacodyl (DULCOLAX) EC tablet 5 mg, 5 mg, Oral, Daily  sennosides-docusate sodium (SENOKOT-S) 8.6-50 MG tablet 1 tablet, 1 tablet, Oral, BID  bisacodyl (DULCOLAX) suppository 10 mg, 10 mg, Rectal, Daily PRN  fleet rectal enema 1 enema, 1 enema, Rectal, Daily PRN  benzocaine-menthol (CEPACOL SORE THROAT) lozenge 1 lozenge, 1 lozenge, Oral, Q2H PRN  docusate sodium (COLACE) capsule 100 mg, 100 mg, Oral, Daily  insulin lispro (HUMALOG) injection vial 0-8 Units, 0-8 Units, SubCUTAneous, TID WC  insulin lispro (HUMALOG) injection vial 0-4 Units, 0-4 Units, SubCUTAneous, Nightly  glucose chewable tablet 16 g, 4 tablet, Oral, PRN  dextrose bolus 10% 125 mL, 125 mL, IntraVENous, PRN **OR** dextrose bolus 10% 250 mL, 250 mL, IntraVENous, PRN  glucagon (rDNA) injection 1 mg, 1 mg, SubCUTAneous, PRN  dextrose 10 % infusion, , IntraVENous, Continuous PRN    ASSESSMENT:   POD #3 s/p L4-S1 laminectomy for decompression and L5-S1 discectomy.   Pain control    PLAN:  Mobilise with PT/OT  Bowel regimen: Dulcolax  Pain control  D/C home with [place]    Thank you so much for allowing us to participate in the care of this patient      Electronically signed by JOSEP Terrell on 8/29/2022 at 9:21 AM

## 2022-08-29 NOTE — CARE COORDINATION
8/29/22 Update CM Note: Order for home wheeled walker called and sent via fax to [address]. All information faxed and patient notified to  walker at the designated address given. Per sabine walker cannot be delivered to the home until the next business day. Patient is discharging to home today and will need the walker for safe ambulation.  Electronically signed by Arturo Holm RN CM on 8/29/2022 at 1:15 PM

## 2022-08-29 NOTE — PROGRESS NOTES
with [employer] Sitting EOB:  Independent  Dynamic Standing:  Modified Independent       Pt is A & O x 4  Sensation:  No reports of numbness/tingling to extremities  Edema:  Unremarkable    Vitals:   HR 93, SpO2 94% at rest.  -103, SpO2 % with activity. Patient education  Pt educated on safety during functional mobility. Patient response to education:   Pt verbalized understanding Pt demonstrated skill Pt requires further education in this area   Yes Yes Reinforcement     ASSESSMENT:    Comments:  Patient sitting in bedside chair upon arrival; agreeable to PT session. Required increased time, verbal cues related to positioning/sequencing to ensure safety during functional transfers. Ambulated with decreased speed and steadiness. Required increased time, steadying assistance to ensure safety during stair negotiation. Rest break provided between activity bouts. Denied dizziness, shortness of breath. Vitals monitored and noted. Patient left sitting in bedside chair with call light in reach. Treatment:  Patient practiced and was instructed in the following treatment:    Functional transfers - performed multiple sit <> stand transfers; verbal cues to facilitate proper positioning and sequencing, particularly related to hand/foot placement; physical assistance provided as needed during activity  Ambulation - performed multiple ambulation bouts; physical assistance provided as needed during activity  Stair negotiation - ascended/descended multiple stairs; verbal cues to facilitate proper positioning; physical assistance provided as needed during activity    PLAN:    Patient is making good progress towards established goals. Will continue with current POC.       Time in  0845  Time out  0910    Total Treatment Time  25 minutes     CPT codes:  [] Gait training 36655 0 minutes  [] Manual therapy 71740 0 minutes  [x] Therapeutic activities 10861 25 minutes  [] Therapeutic exercises 35916 0 minutes  [] Neuromuscular reeducation 73117 0 minutes    Robe Swift, PT, DPT  [id number]

## 2022-08-30 RX ADMIN — MORPHINE SULFATE 4 MG: 2 INJECTION, SOLUTION INTRAMUSCULAR; INTRAVENOUS at 12:28

## 2022-09-23 ENCOUNTER — OFFICE VISIT (OUTPATIENT)
Dept: NEUROSURGERY | Age: 57
End: 2022-09-23
Payer: COMMERCIAL

## 2022-09-23 VITALS
HEIGHT: 75 IN | WEIGHT: 240 LBS | HEART RATE: 68 BPM | BODY MASS INDEX: 29.84 KG/M2 | RESPIRATION RATE: 18 BRPM | DIASTOLIC BLOOD PRESSURE: 82 MMHG | OXYGEN SATURATION: 99 % | TEMPERATURE: 98 F | SYSTOLIC BLOOD PRESSURE: 128 MMHG

## 2022-09-23 DIAGNOSIS — Z98.890 S/P LAMINECTOMY: Primary | ICD-10-CM

## 2022-09-23 DIAGNOSIS — M48.061 SPINAL STENOSIS OF LUMBAR REGION, UNSPECIFIED WHETHER NEUROGENIC CLAUDICATION PRESENT: ICD-10-CM

## 2022-09-23 PROCEDURE — 99212 OFFICE O/P EST SF 10 MIN: CPT

## 2022-09-23 PROCEDURE — 99024 POSTOP FOLLOW-UP VISIT: CPT | Performed by: STUDENT IN AN ORGANIZED HEALTH CARE EDUCATION/TRAINING PROGRAM

## 2022-09-23 RX ORDER — SERTRALINE HYDROCHLORIDE 100 MG/1
100 TABLET, FILM COATED ORAL DAILY
COMMUNITY

## 2022-09-23 RX ORDER — OXYCODONE HYDROCHLORIDE 5 MG/1
5 TABLET ORAL EVERY 4 HOURS PRN
Qty: 42 TABLET | Refills: 0 | Status: SHIPPED | OUTPATIENT
Start: 2022-09-23 | End: 2022-09-30

## 2022-09-23 RX ORDER — IBUPROFEN 800 MG/1
800 TABLET ORAL 2 TIMES DAILY PRN
Qty: 60 TABLET | Refills: 0 | Status: SHIPPED | OUTPATIENT
Start: 2022-09-23 | End: 2022-10-23

## 2022-11-18 ENCOUNTER — OFFICE VISIT (OUTPATIENT)
Dept: NEUROSURGERY | Age: 57
End: 2022-11-18
Payer: COMMERCIAL

## 2022-11-18 VITALS
OXYGEN SATURATION: 94 % | RESPIRATION RATE: 18 BRPM | DIASTOLIC BLOOD PRESSURE: 85 MMHG | TEMPERATURE: 98.2 F | HEIGHT: 75 IN | WEIGHT: 240 LBS | HEART RATE: 86 BPM | SYSTOLIC BLOOD PRESSURE: 143 MMHG | BODY MASS INDEX: 29.84 KG/M2

## 2022-11-18 DIAGNOSIS — Z98.890 S/P LAMINECTOMY: Primary | ICD-10-CM

## 2022-11-18 PROCEDURE — 99024 POSTOP FOLLOW-UP VISIT: CPT | Performed by: STUDENT IN AN ORGANIZED HEALTH CARE EDUCATION/TRAINING PROGRAM

## 2022-11-18 PROCEDURE — 99212 OFFICE O/P EST SF 10 MIN: CPT

## 2022-11-18 NOTE — PROGRESS NOTES
Post-Operative Follow-up     This is a 49year old male who presents to the office for a 3 month follow-up s/p L4-S1 laminectomy. Subjective: Patient states he is doing okay. States his back pain has improved but is still having right calf cramps. He states these cramps have been there since surgery and have not gotten any better. He denies any new numbness or weakness. No pain radiating down his legs. Physical Exam:              WDWN, no apparent distress              Non-labored breathing               Vitals Stable              Alert and oriented x3              CN 3-12 intact              PERRL              EOMI              DC well              Motor strength symmetric              Sensation to LT intact bilaterally   Incision healed well without signs of infection. Assessment: This is a 49 y.o.  male presenting for a 3 month follow-up s/p L4-S1 laminectomy      Plan:  -Pain control and expectations discussed  -Can discontinue restrictions   -PT referral given  -OARRS report reviewed   -Follow-up in neurosurgery clinic in 9 months for 1 year follow up.   -Call or return to neurosurgery office sooner if symptoms worsen or if new issues arise in the interim.     Electronically signed by Rehan Alfred PA-C on 11/18/2022 at 4:43 PM

## 2022-12-13 ENCOUNTER — TELEPHONE (OUTPATIENT)
Dept: NEUROSURGERY | Age: 57
End: 2022-12-13

## 2022-12-13 NOTE — TELEPHONE ENCOUNTER
Patient was here on 11/18/22 for 3 month post op and is requesting a return to work letter for January 2. Are we able do this?   [phone]

## 2022-12-13 NOTE — TELEPHONE ENCOUNTER
Typed letter for return to work. Patient requested that it be mailed to his home. Verified address in computer.

## 2023-03-17 ENCOUNTER — OFFICE VISIT (OUTPATIENT)
Dept: NEUROSURGERY | Age: 58
End: 2023-03-17

## 2023-03-17 ENCOUNTER — HOSPITAL ENCOUNTER (OUTPATIENT)
Age: 58
Discharge: HOME | End: 2023-03-17
Payer: COMMERCIAL

## 2023-03-17 VITALS
HEIGHT: 75 IN | BODY MASS INDEX: 29.84 KG/M2 | SYSTOLIC BLOOD PRESSURE: 146 MMHG | TEMPERATURE: 97.7 F | WEIGHT: 240 LBS | HEART RATE: 109 BPM | DIASTOLIC BLOOD PRESSURE: 88 MMHG | OXYGEN SATURATION: 94 %

## 2023-03-17 DIAGNOSIS — E11.69 TYPE 2 DIABETES MELLITUS WITH OTHER SPECIFIED COMPLICATION, UNSPECIFIED WHETHER LONG TERM INSULIN USE (HCC): Primary | ICD-10-CM

## 2023-03-17 DIAGNOSIS — Z01.818 PRE-OP TESTING: ICD-10-CM

## 2023-03-17 DIAGNOSIS — E11.69 TYPE 2 DIABETES MELLITUS WITH OTHER SPECIFIED COMPLICATION, UNSPECIFIED WHETHER LONG TERM INSULIN USE (HCC): ICD-10-CM

## 2023-03-17 DIAGNOSIS — M48.062 LUMBAR STENOSIS WITH NEUROGENIC CLAUDICATION: ICD-10-CM

## 2023-03-17 DIAGNOSIS — Z98.890 S/P LAMINECTOMY: Primary | ICD-10-CM

## 2023-03-17 DIAGNOSIS — M54.9 MID BACK PAIN: ICD-10-CM

## 2023-03-17 LAB
ALBUMIN SERPL-MCNC: 4 G/DL (ref 3.5–5.2)
ALP SERPL-CCNC: 117 U/L (ref 40–129)
ALT SERPL-CCNC: 20 U/L (ref 0–40)
ANION GAP SERPL CALCULATED.3IONS-SCNC: 16 MMOL/L (ref 7–16)
AST SERPL-CCNC: 9 U/L (ref 0–39)
BILIRUB SERPL-MCNC: 0.8 MG/DL (ref 0–1.2)
BUN SERPL-MCNC: 22 MG/DL (ref 6–20)
CALCIUM SERPL-MCNC: 9.8 MG/DL (ref 8.6–10.2)
CHLORIDE SERPL-SCNC: 92 MMOL/L (ref 98–107)
CO2 SERPL-SCNC: 25 MMOL/L (ref 22–29)
CREAT SERPL-MCNC: 0.9 MG/DL (ref 0.7–1.2)
GLUCOSE SERPL-MCNC: 423 MG/DL (ref 74–99)
POTASSIUM SERPL-SCNC: 4.3 MMOL/L (ref 3.5–5)
PROT SERPL-MCNC: 7 G/DL (ref 6.4–8.3)
SODIUM SERPL-SCNC: 133 MMOL/L (ref 132–146)

## 2023-03-17 PROCEDURE — 80053 COMPREHEN METABOLIC PANEL: CPT

## 2023-03-17 PROCEDURE — 36415 COLL VENOUS BLD VENIPUNCTURE: CPT

## 2023-03-17 RX ORDER — PREDNISONE 20 MG/1
TABLET ORAL
COMMUNITY
Start: 2023-03-14

## 2023-03-17 RX ORDER — ROSUVASTATIN CALCIUM 20 MG/1
TABLET, COATED ORAL
COMMUNITY

## 2023-03-17 RX ORDER — LOSARTAN POTASSIUM 100 MG/1
TABLET ORAL
COMMUNITY
Start: 2022-12-14

## 2023-03-17 RX ORDER — OMEPRAZOLE 40 MG/1
CAPSULE, DELAYED RELEASE ORAL
COMMUNITY
Start: 2023-03-12

## 2023-03-17 RX ORDER — AMLODIPINE BESYLATE 5 MG/1
TABLET ORAL
COMMUNITY
Start: 2022-12-14

## 2023-03-17 RX ORDER — METHOCARBAMOL 750 MG/1
TABLET, FILM COATED ORAL
COMMUNITY
Start: 2023-03-14

## 2023-03-17 RX ORDER — SUCRALFATE 1 G/1
TABLET ORAL
COMMUNITY
Start: 2023-03-12

## 2023-03-17 NOTE — PROGRESS NOTES
Problem Focused Office Visit     Subjective: Sarah Beth Mayes is a 56 y.o.  male who has a past medical history of previous L4-S1 laminectomy by Dr. To Degroot on 8/26/2023 who presents for mid back pain. Patient states he was at work a few weeks ago when he picking up and heavy sledge hammer and swinging it. Since then he has had a sharp, stabbing mid back pain. He states he went to the chiropractor after and he adjusted him and he heard a crack and pain progressively got worse. Patient states this is a workers comp case, and he is waiting on a claim number from his boss. He admits to some associated numbness with this pain. No associated weakness. No aggravating or alleviating factors. He has not tried PT or BEATA for this pain. He is a  nonsmoker and denies any blood thinner usage. CT Scan from [place] report reads pleural effusion and disc herniation at T10-T11, no images are available for me to view. Physical Exam  HENT:      Head: Normocephalic. Eyes:      Pupils: Pupils are equal, round, and reactive to light. Cardiovascular:      Rate and Rhythm: Normal rate. Pulmonary:      Effort: Pulmonary effort is normal.   Abdominal:      General: There is no distension. Musculoskeletal:         General: Normal range of motion. Cervical back: Normal range of motion. Skin:     General: Skin is warm and dry. Neurological:      Mental Status: He is alert. Comments: A&Ox3  CN3-12 intact  Motor Strength full   Sensation intact to light touch   Reflexes normal   Lumbar Incision healed well without signs of infection  Mild tenderness to palpation of thoracic and lumbar spine   Psychiatric:         Thought Content: Thought content normal.             Assessment: This is a 56 y.o.  male presenting for increased mid back pain after heavy lifting at work. This is a possible workers comp case, awaiting claim number. No recent PT or BEATA. Hx of L4-S1 laminectomy.       Plan:  -Pain control and expectations discussed  -Obtain MRI Thoracic and Lumbar spine to evaluate for stenosis   -OARRS report reviewed   -Call/Return to Neurosurgery clinic after completion of imaging to discuss results and further treatment plan  -Call/return sooner if symptoms worsen or new issues arise in the interim       Electronically signed by Ryan Smith PA-C on 3/17/2023 at 9:30 AM

## 2023-03-21 ENCOUNTER — APPOINTMENT (OUTPATIENT)
Dept: MRI IMAGING | Age: 58
DRG: 853 | End: 2023-03-21
Payer: OTHER MISCELLANEOUS

## 2023-03-21 ENCOUNTER — HOSPITAL ENCOUNTER (INPATIENT)
Age: 58
LOS: 9 days | Discharge: HOME | DRG: 853 | End: 2023-03-30
Attending: EMERGENCY MEDICINE | Admitting: HOSPITALIST
Payer: OTHER MISCELLANEOUS

## 2023-03-21 ENCOUNTER — APPOINTMENT (OUTPATIENT)
Dept: GENERAL RADIOLOGY | Age: 58
DRG: 853 | End: 2023-03-21
Payer: OTHER MISCELLANEOUS

## 2023-03-21 ENCOUNTER — APPOINTMENT (OUTPATIENT)
Dept: CT IMAGING | Age: 58
DRG: 853 | End: 2023-03-21
Payer: OTHER MISCELLANEOUS

## 2023-03-21 DIAGNOSIS — G89.18 POST-OP PAIN: ICD-10-CM

## 2023-03-21 DIAGNOSIS — N17.9 AKI (ACUTE KIDNEY INJURY) (HCC): ICD-10-CM

## 2023-03-21 DIAGNOSIS — J86.9 EMPYEMA (HCC): ICD-10-CM

## 2023-03-21 DIAGNOSIS — J18.9 PNEUMONIA DUE TO INFECTIOUS ORGANISM, UNSPECIFIED LATERALITY, UNSPECIFIED PART OF LUNG: Primary | ICD-10-CM

## 2023-03-21 DIAGNOSIS — K59.00 CONSTIPATION, UNSPECIFIED CONSTIPATION TYPE: ICD-10-CM

## 2023-03-21 DIAGNOSIS — R73.9 HYPERGLYCEMIA: ICD-10-CM

## 2023-03-21 PROBLEM — R20.0 BACK PAIN ASSOCIATED WITH PERIPHERAL NUMBNESS: Status: ACTIVE | Noted: 2023-03-21

## 2023-03-21 PROBLEM — M54.9 BACK PAIN ASSOCIATED WITH PERIPHERAL NUMBNESS: Status: ACTIVE | Noted: 2023-03-21

## 2023-03-21 LAB
ALBUMIN SERPL-MCNC: 3.5 G/DL (ref 3.5–5.2)
ALP SERPL-CCNC: 156 U/L (ref 40–129)
ALT SERPL-CCNC: 19 U/L (ref 0–40)
ANION GAP SERPL CALCULATED.3IONS-SCNC: 11 MMOL/L (ref 7–16)
AST SERPL-CCNC: 5 U/L (ref 0–39)
BACTERIA URNS QL MICRO: ABNORMAL /HPF
BASOPHILS # BLD: 0.03 E9/L (ref 0–0.2)
BASOPHILS NFR BLD: 0.1 % (ref 0–2)
BILIRUB SERPL-MCNC: 0.8 MG/DL (ref 0–1.2)
BILIRUB UR QL STRIP: NEGATIVE
BUN SERPL-MCNC: 45 MG/DL (ref 6–20)
CALCIUM SERPL-MCNC: 9.7 MG/DL (ref 8.6–10.2)
CHLORIDE SERPL-SCNC: 85 MMOL/L (ref 98–107)
CHP ED QC CHECK: YES
CLARITY UR: CLEAR
CO2 SERPL-SCNC: 29 MMOL/L (ref 22–29)
COLOR UR: YELLOW
CREAT SERPL-MCNC: 1.5 MG/DL (ref 0.7–1.2)
CRP SERPL HS-MCNC: 30.6 MG/DL (ref 0–0.4)
EOSINOPHIL # BLD: 0 E9/L (ref 0.05–0.5)
EOSINOPHIL NFR BLD: 0 % (ref 0–6)
EPI CELLS #/AREA URNS HPF: ABNORMAL /HPF
ERYTHROCYTE [DISTWIDTH] IN BLOOD BY AUTOMATED COUNT: 12.3 FL (ref 11.5–15)
ERYTHROCYTE [SEDIMENTATION RATE] IN BLOOD BY WESTERGREN METHOD: 33 MM/HR (ref 0–15)
GLUCOSE BLD-MCNC: 428 MG/DL
GLUCOSE SERPL-MCNC: 678 MG/DL (ref 74–99)
GLUCOSE SERPL-MCNC: 788 MG/DL (ref 74–99)
GLUCOSE UR STRIP-MCNC: >=1000 MG/DL
HBA1C MFR BLD: 10.5 % (ref 4–5.6)
HCT VFR BLD AUTO: 48.8 % (ref 37–54)
HGB BLD-MCNC: 16.3 G/DL (ref 12.5–16.5)
HGB UR QL STRIP: NEGATIVE
IMM GRANULOCYTES # BLD: 0.22 E9/L
IMM GRANULOCYTES NFR BLD: 1 % (ref 0–5)
KETONES UR STRIP-MCNC: NEGATIVE MG/DL
LEUKOCYTE ESTERASE UR QL STRIP: NEGATIVE
LIPASE: 75 U/L (ref 13–60)
LYMPHOCYTES # BLD: 0.99 E9/L (ref 1.5–4)
LYMPHOCYTES NFR BLD: 4.4 % (ref 20–42)
MAGNESIUM SERPL-MCNC: 2.3 MG/DL (ref 1.6–2.6)
MCH RBC QN AUTO: 28.4 PG (ref 26–35)
MCHC RBC AUTO-ENTMCNC: 33.4 % (ref 32–34.5)
MCV RBC AUTO: 85 FL (ref 80–99.9)
METER GLUCOSE: 335 MG/DL (ref 74–99)
METER GLUCOSE: 428 MG/DL (ref 74–99)
METER GLUCOSE: 469 MG/DL (ref 74–99)
METER GLUCOSE: >500 MG/DL (ref 74–99)
METER GLUCOSE: >500 MG/DL (ref 74–99)
MONOCYTES # BLD: 1.38 E9/L (ref 0.1–0.95)
MONOCYTES NFR BLD: 6.2 % (ref 2–12)
NEUTROPHILS # BLD: 19.8 E9/L (ref 1.8–7.3)
NEUTS SEG NFR BLD: 88.3 % (ref 43–80)
NITRITE UR QL STRIP: NEGATIVE
PH UR STRIP: 5.5 [PH] (ref 5–9)
PLATELET # BLD AUTO: 333 E9/L (ref 130–450)
PMV BLD AUTO: 9.3 FL (ref 7–12)
POTASSIUM SERPL-SCNC: 5 MMOL/L (ref 3.5–5)
PROT SERPL-MCNC: 7.3 G/DL (ref 6.4–8.3)
PROT UR STRIP-MCNC: 30 MG/DL
RBC # BLD AUTO: 5.74 E12/L (ref 3.8–5.8)
RBC #/AREA URNS HPF: ABNORMAL /HPF (ref 0–2)
SODIUM SERPL-SCNC: 125 MMOL/L (ref 132–146)
SP GR UR STRIP: 1.02 (ref 1–1.03)
UROBILINOGEN UR STRIP-ACNC: 0.2 E.U./DL
WBC # BLD: 22.4 E9/L (ref 4.5–11.5)
WBC #/AREA URNS HPF: ABNORMAL /HPF (ref 0–5)

## 2023-03-21 PROCEDURE — 99222 1ST HOSP IP/OBS MODERATE 55: CPT | Performed by: NEUROLOGICAL SURGERY

## 2023-03-21 PROCEDURE — 96375 TX/PRO/DX INJ NEW DRUG ADDON: CPT

## 2023-03-21 PROCEDURE — 6360000002 HC RX W HCPCS: Performed by: NURSE PRACTITIONER

## 2023-03-21 PROCEDURE — 83690 ASSAY OF LIPASE: CPT

## 2023-03-21 PROCEDURE — 96374 THER/PROPH/DIAG INJ IV PUSH: CPT

## 2023-03-21 PROCEDURE — 87077 CULTURE AEROBIC IDENTIFY: CPT

## 2023-03-21 PROCEDURE — 36415 COLL VENOUS BLD VENIPUNCTURE: CPT

## 2023-03-21 PROCEDURE — 6370000000 HC RX 637 (ALT 250 FOR IP): Performed by: NURSE PRACTITIONER

## 2023-03-21 PROCEDURE — 1200000000 HC SEMI PRIVATE

## 2023-03-21 PROCEDURE — 85025 COMPLETE CBC W/AUTO DIFF WBC: CPT

## 2023-03-21 PROCEDURE — 71046 X-RAY EXAM CHEST 2 VIEWS: CPT

## 2023-03-21 PROCEDURE — 2580000003 HC RX 258: Performed by: NURSE PRACTITIONER

## 2023-03-21 PROCEDURE — 87040 BLOOD CULTURE FOR BACTERIA: CPT

## 2023-03-21 PROCEDURE — 83036 HEMOGLOBIN GLYCOSYLATED A1C: CPT

## 2023-03-21 PROCEDURE — 6360000002 HC RX W HCPCS: Performed by: HOSPITALIST

## 2023-03-21 PROCEDURE — 86140 C-REACTIVE PROTEIN: CPT

## 2023-03-21 PROCEDURE — 6360000002 HC RX W HCPCS: Performed by: EMERGENCY MEDICINE

## 2023-03-21 PROCEDURE — 72158 MRI LUMBAR SPINE W/O & W/DYE: CPT

## 2023-03-21 PROCEDURE — 81001 URINALYSIS AUTO W/SCOPE: CPT

## 2023-03-21 PROCEDURE — 72157 MRI CHEST SPINE W/O & W/DYE: CPT

## 2023-03-21 PROCEDURE — 96361 HYDRATE IV INFUSION ADD-ON: CPT

## 2023-03-21 PROCEDURE — A9577 INJ MULTIHANCE: HCPCS | Performed by: RADIOLOGY

## 2023-03-21 PROCEDURE — 6360000004 HC RX CONTRAST MEDICATION: Performed by: RADIOLOGY

## 2023-03-21 PROCEDURE — 74176 CT ABD & PELVIS W/O CONTRAST: CPT

## 2023-03-21 PROCEDURE — 2580000003 HC RX 258: Performed by: HOSPITALIST

## 2023-03-21 PROCEDURE — 87186 SC STD MICRODIL/AGAR DIL: CPT

## 2023-03-21 PROCEDURE — 6370000000 HC RX 637 (ALT 250 FOR IP): Performed by: HOSPITALIST

## 2023-03-21 PROCEDURE — 83735 ASSAY OF MAGNESIUM: CPT

## 2023-03-21 PROCEDURE — 96376 TX/PRO/DX INJ SAME DRUG ADON: CPT

## 2023-03-21 PROCEDURE — 2580000003 HC RX 258: Performed by: EMERGENCY MEDICINE

## 2023-03-21 PROCEDURE — 80053 COMPREHEN METABOLIC PANEL: CPT

## 2023-03-21 PROCEDURE — 82962 GLUCOSE BLOOD TEST: CPT

## 2023-03-21 PROCEDURE — 85651 RBC SED RATE NONAUTOMATED: CPT

## 2023-03-21 PROCEDURE — 82947 ASSAY GLUCOSE BLOOD QUANT: CPT

## 2023-03-21 PROCEDURE — 99285 EMERGENCY DEPT VISIT HI MDM: CPT

## 2023-03-21 PROCEDURE — S5553 INSULIN LONG ACTING 5 U: HCPCS | Performed by: HOSPITALIST

## 2023-03-21 PROCEDURE — 87150 DNA/RNA AMPLIFIED PROBE: CPT

## 2023-03-21 RX ORDER — ACETAMINOPHEN 325 MG/1
650 TABLET ORAL EVERY 6 HOURS PRN
Status: DISCONTINUED | OUTPATIENT
Start: 2023-03-21 | End: 2023-03-22

## 2023-03-21 RX ORDER — INSULIN GLARGINE-YFGN 100 [IU]/ML
40 INJECTION, SOLUTION SUBCUTANEOUS 2 TIMES DAILY
Status: DISCONTINUED | OUTPATIENT
Start: 2023-03-21 | End: 2023-03-26

## 2023-03-21 RX ORDER — POLYETHYLENE GLYCOL 3350 17 G/17G
17 POWDER, FOR SOLUTION ORAL DAILY PRN
Status: DISCONTINUED | OUTPATIENT
Start: 2023-03-21 | End: 2023-03-22

## 2023-03-21 RX ORDER — SERTRALINE HYDROCHLORIDE 100 MG/1
100 TABLET, FILM COATED ORAL DAILY
Status: DISCONTINUED | OUTPATIENT
Start: 2023-03-21 | End: 2023-03-30 | Stop reason: HOSPADM

## 2023-03-21 RX ORDER — ROSUVASTATIN CALCIUM 20 MG/1
20 TABLET, COATED ORAL NIGHTLY
Status: DISCONTINUED | OUTPATIENT
Start: 2023-03-21 | End: 2023-03-30 | Stop reason: HOSPADM

## 2023-03-21 RX ORDER — INSULIN LISPRO 100 [IU]/ML
15 INJECTION, SOLUTION INTRAVENOUS; SUBCUTANEOUS
Status: DISCONTINUED | OUTPATIENT
Start: 2023-03-22 | End: 2023-03-29

## 2023-03-21 RX ORDER — INSULIN LISPRO 100 [IU]/ML
0-16 INJECTION, SOLUTION INTRAVENOUS; SUBCUTANEOUS
Status: DISCONTINUED | OUTPATIENT
Start: 2023-03-22 | End: 2023-03-30 | Stop reason: HOSPADM

## 2023-03-21 RX ORDER — SODIUM CHLORIDE 9 MG/ML
INJECTION, SOLUTION INTRAVENOUS ONCE
Status: COMPLETED | OUTPATIENT
Start: 2023-03-21 | End: 2023-03-21

## 2023-03-21 RX ORDER — SODIUM CHLORIDE 9 MG/ML
INJECTION, SOLUTION INTRAVENOUS CONTINUOUS
Status: DISCONTINUED | OUTPATIENT
Start: 2023-03-21 | End: 2023-03-22

## 2023-03-21 RX ORDER — INSULIN LISPRO 100 [IU]/ML
0-4 INJECTION, SOLUTION INTRAVENOUS; SUBCUTANEOUS
Status: DISCONTINUED | OUTPATIENT
Start: 2023-03-21 | End: 2023-03-21 | Stop reason: SDUPTHER

## 2023-03-21 RX ORDER — INSULIN LISPRO 100 [IU]/ML
0-4 INJECTION, SOLUTION INTRAVENOUS; SUBCUTANEOUS NIGHTLY
Status: DISCONTINUED | OUTPATIENT
Start: 2023-03-22 | End: 2023-03-30 | Stop reason: HOSPADM

## 2023-03-21 RX ORDER — OXYCODONE HYDROCHLORIDE 5 MG/1
5 TABLET ORAL EVERY 4 HOURS PRN
Status: DISCONTINUED | OUTPATIENT
Start: 2023-03-21 | End: 2023-03-27

## 2023-03-21 RX ORDER — ACETAMINOPHEN 650 MG/1
650 SUPPOSITORY RECTAL EVERY 6 HOURS PRN
Status: DISCONTINUED | OUTPATIENT
Start: 2023-03-21 | End: 2023-03-22

## 2023-03-21 RX ORDER — ONDANSETRON 2 MG/ML
4 INJECTION INTRAMUSCULAR; INTRAVENOUS ONCE
Status: COMPLETED | OUTPATIENT
Start: 2023-03-21 | End: 2023-03-21

## 2023-03-21 RX ORDER — SODIUM CHLORIDE 0.9 % (FLUSH) 0.9 %
10 SYRINGE (ML) INJECTION EVERY 12 HOURS SCHEDULED
Status: DISCONTINUED | OUTPATIENT
Start: 2023-03-21 | End: 2023-03-30 | Stop reason: HOSPADM

## 2023-03-21 RX ORDER — ONDANSETRON 2 MG/ML
4 INJECTION INTRAMUSCULAR; INTRAVENOUS EVERY 6 HOURS PRN
Status: DISCONTINUED | OUTPATIENT
Start: 2023-03-21 | End: 2023-03-27

## 2023-03-21 RX ORDER — INSULIN LISPRO 100 [IU]/ML
20 INJECTION, SOLUTION INTRAVENOUS; SUBCUTANEOUS ONCE
Status: COMPLETED | OUTPATIENT
Start: 2023-03-21 | End: 2023-03-21

## 2023-03-21 RX ORDER — INSULIN LISPRO 100 [IU]/ML
10 INJECTION, SOLUTION INTRAVENOUS; SUBCUTANEOUS
Status: DISCONTINUED | OUTPATIENT
Start: 2023-03-21 | End: 2023-03-21

## 2023-03-21 RX ORDER — INSULIN LISPRO 100 [IU]/ML
0-4 INJECTION, SOLUTION INTRAVENOUS; SUBCUTANEOUS NIGHTLY
Status: DISCONTINUED | OUTPATIENT
Start: 2023-03-21 | End: 2023-03-21

## 2023-03-21 RX ORDER — DEXTROSE MONOHYDRATE 100 MG/ML
INJECTION, SOLUTION INTRAVENOUS CONTINUOUS PRN
Status: DISCONTINUED | OUTPATIENT
Start: 2023-03-21 | End: 2023-03-21 | Stop reason: SDUPTHER

## 2023-03-21 RX ORDER — FENTANYL CITRATE 50 UG/ML
25 INJECTION, SOLUTION INTRAMUSCULAR; INTRAVENOUS ONCE
Status: COMPLETED | OUTPATIENT
Start: 2023-03-21 | End: 2023-03-21

## 2023-03-21 RX ORDER — DEXTROSE MONOHYDRATE 100 MG/ML
INJECTION, SOLUTION INTRAVENOUS CONTINUOUS PRN
Status: DISCONTINUED | OUTPATIENT
Start: 2023-03-21 | End: 2023-03-30 | Stop reason: HOSPADM

## 2023-03-21 RX ORDER — INSULIN LISPRO 100 [IU]/ML
0-4 INJECTION, SOLUTION INTRAVENOUS; SUBCUTANEOUS NIGHTLY
Status: DISCONTINUED | OUTPATIENT
Start: 2023-03-22 | End: 2023-03-21 | Stop reason: SDUPTHER

## 2023-03-21 RX ORDER — SODIUM CHLORIDE 0.9 % (FLUSH) 0.9 %
10 SYRINGE (ML) INJECTION PRN
Status: DISCONTINUED | OUTPATIENT
Start: 2023-03-21 | End: 2023-03-30 | Stop reason: HOSPADM

## 2023-03-21 RX ORDER — 0.9 % SODIUM CHLORIDE 0.9 %
1000 INTRAVENOUS SOLUTION INTRAVENOUS ONCE
Status: COMPLETED | OUTPATIENT
Start: 2023-03-21 | End: 2023-03-21

## 2023-03-21 RX ORDER — SODIUM CHLORIDE 9 MG/ML
INJECTION, SOLUTION INTRAVENOUS PRN
Status: DISCONTINUED | OUTPATIENT
Start: 2023-03-21 | End: 2023-03-30 | Stop reason: HOSPADM

## 2023-03-21 RX ORDER — ENOXAPARIN SODIUM 100 MG/ML
30 INJECTION SUBCUTANEOUS 2 TIMES DAILY
Status: DISCONTINUED | OUTPATIENT
Start: 2023-03-21 | End: 2023-03-24

## 2023-03-21 RX ORDER — PROMETHAZINE HYDROCHLORIDE 12.5 MG/1
12.5 TABLET ORAL EVERY 6 HOURS PRN
Status: DISCONTINUED | OUTPATIENT
Start: 2023-03-21 | End: 2023-03-27

## 2023-03-21 RX ADMIN — GADOBENATE DIMEGLUMINE 20 ML: 529 INJECTION, SOLUTION INTRAVENOUS at 14:57

## 2023-03-21 RX ADMIN — OXYCODONE HYDROCHLORIDE 5 MG: 5 TABLET ORAL at 18:32

## 2023-03-21 RX ADMIN — SODIUM CHLORIDE: 9 INJECTION, SOLUTION INTRAVENOUS at 13:22

## 2023-03-21 RX ADMIN — ONDANSETRON 4 MG: 2 INJECTION INTRAMUSCULAR; INTRAVENOUS at 11:01

## 2023-03-21 RX ADMIN — INSULIN LISPRO 4 UNITS: 100 INJECTION, SOLUTION INTRAVENOUS; SUBCUTANEOUS at 21:44

## 2023-03-21 RX ADMIN — FENTANYL CITRATE 25 MCG: 0.05 INJECTION, SOLUTION INTRAMUSCULAR; INTRAVENOUS at 15:52

## 2023-03-21 RX ADMIN — Medication 10 ML: at 21:48

## 2023-03-21 RX ADMIN — FENTANYL CITRATE 25 MCG: 0.05 INJECTION, SOLUTION INTRAMUSCULAR; INTRAVENOUS at 10:58

## 2023-03-21 RX ADMIN — AZITHROMYCIN MONOHYDRATE 500 MG: 500 INJECTION, POWDER, LYOPHILIZED, FOR SOLUTION INTRAVENOUS at 18:11

## 2023-03-21 RX ADMIN — OXYCODONE HYDROCHLORIDE 5 MG: 5 TABLET ORAL at 22:43

## 2023-03-21 RX ADMIN — CEFTRIAXONE SODIUM 2000 MG: 2 INJECTION, POWDER, FOR SOLUTION INTRAMUSCULAR; INTRAVENOUS at 17:00

## 2023-03-21 RX ADMIN — SODIUM CHLORIDE 1000 ML: 9 INJECTION, SOLUTION INTRAVENOUS at 11:48

## 2023-03-21 RX ADMIN — SERTRALINE 100 MG: 100 TABLET, FILM COATED ORAL at 18:54

## 2023-03-21 RX ADMIN — INSULIN HUMAN 10 UNITS: 100 INJECTION, SOLUTION PARENTERAL at 11:49

## 2023-03-21 RX ADMIN — INSULIN LISPRO 20 UNITS: 100 INJECTION, SOLUTION INTRAVENOUS; SUBCUTANEOUS at 22:46

## 2023-03-21 RX ADMIN — SODIUM CHLORIDE: 9 INJECTION, SOLUTION INTRAVENOUS at 17:27

## 2023-03-21 RX ADMIN — FENTANYL CITRATE 25 MCG: 50 INJECTION, SOLUTION INTRAMUSCULAR; INTRAVENOUS at 13:30

## 2023-03-21 RX ADMIN — ENOXAPARIN SODIUM 30 MG: 100 INJECTION SUBCUTANEOUS at 21:27

## 2023-03-21 RX ADMIN — ROSUVASTATIN CALCIUM 20 MG: 20 TABLET, FILM COATED ORAL at 21:27

## 2023-03-21 RX ADMIN — AZITHROMYCIN MONOHYDRATE 500 MG: 500 INJECTION, POWDER, LYOPHILIZED, FOR SOLUTION INTRAVENOUS at 17:20

## 2023-03-21 RX ADMIN — INSULIN GLARGINE-YFGN 40 UNITS: 100 INJECTION, SOLUTION SUBCUTANEOUS at 18:54

## 2023-03-21 ASSESSMENT — ENCOUNTER SYMPTOMS
TROUBLE SWALLOWING: 0
SHORTNESS OF BREATH: 0
ABDOMINAL PAIN: 0
BACK PAIN: 1
PHOTOPHOBIA: 0

## 2023-03-21 ASSESSMENT — PAIN SCALES - GENERAL
PAINLEVEL_OUTOF10: 8
PAINLEVEL_OUTOF10: 3
PAINLEVEL_OUTOF10: 9
PAINLEVEL_OUTOF10: 8
PAINLEVEL_OUTOF10: 10
PAINLEVEL_OUTOF10: 8
PAINLEVEL_OUTOF10: 10
PAINLEVEL_OUTOF10: 10

## 2023-03-21 ASSESSMENT — PAIN DESCRIPTION - ONSET: ONSET: SUDDEN

## 2023-03-21 ASSESSMENT — PAIN DESCRIPTION - LOCATION
LOCATION: ABDOMEN

## 2023-03-21 ASSESSMENT — PAIN DESCRIPTION - ORIENTATION
ORIENTATION: RIGHT
ORIENTATION: RIGHT
ORIENTATION: RIGHT;MID

## 2023-03-21 ASSESSMENT — PAIN DESCRIPTION - DESCRIPTORS
DESCRIPTORS: SHARP
DESCRIPTORS: ACHING;SPASM;SHARP
DESCRIPTORS: ACHING;SPASM

## 2023-03-21 ASSESSMENT — PAIN - FUNCTIONAL ASSESSMENT
PAIN_FUNCTIONAL_ASSESSMENT: ACTIVITIES ARE NOT PREVENTED
PAIN_FUNCTIONAL_ASSESSMENT: 0-10

## 2023-03-21 ASSESSMENT — PAIN DESCRIPTION - FREQUENCY: FREQUENCY: CONTINUOUS

## 2023-03-21 ASSESSMENT — PAIN DESCRIPTION - PAIN TYPE: TYPE: ACUTE PAIN

## 2023-03-21 NOTE — CONSULTS
25 MG tablet Take 25 mg by mouth in the morning. Allergies:    Patient has no known allergies. /66   Pulse 82   Temp 97.4 °F (36.3 °C) (Oral)   Resp 16   Ht 6' 3\" (1.905 m)   Wt 240 lb (108.9 kg)   SpO2 95%   BMI 30.00 kg/m²     Review of Systems   Constitutional:  Negative for fever and unexpected weight change. HENT:  Negative for trouble swallowing. Eyes:  Negative for photophobia and visual disturbance. Respiratory:  Negative for shortness of breath. Cardiovascular:  Negative for chest pain. Gastrointestinal:  Negative for abdominal pain. Endocrine: Negative for heat intolerance. Genitourinary:  Negative for flank pain. Musculoskeletal:  Positive for back pain. Negative for gait problem, myalgias and neck pain. Skin:  Negative for wound. Neurological:  Negative for weakness, numbness and headaches. Psychiatric/Behavioral:  Negative for confusion. Physical Exam  Constitutional:       Appearance: Normal appearance. He is well-developed. HENT:      Head: Normocephalic and atraumatic. Eyes:      Extraocular Movements: Extraocular movements intact. Conjunctiva/sclera: Conjunctivae normal.      Pupils: Pupils are equal, round, and reactive to light. Cardiovascular:      Rate and Rhythm: Normal rate. Pulmonary:      Effort: Pulmonary effort is normal.   Abdominal:      General: There is no distension. Musculoskeletal:      Cervical back: Normal range of motion and neck supple. Skin:     General: Skin is warm and dry. Neurological:      Mental Status: He is alert. Comments: Alert and oriented x3  Motor strength full, pain with movement of UE  Sensation intact to light touch     Psychiatric:         Thought Content: Thought content normal.          Assessment:   Mid back pain - soft tissue strain   Hx L4-S1 laminectomy 8/26/23  Elevated glucose levels - 788  MATTHIEU    Plan:  -MRIs reviewed. No surgical intervention needed.  Osteophytes noted without stenosis. Paravertebral soft tissue strain noted. Normal post surgical changes noted in lumbar spine  -PT/OT  -Pain control  -Medical management      Electronically signed by Jennifer Soto PA-C on 3/21/2023 at 4:54 PM     I have interviewed and examined the patient and agree with above.  MRI's are unremarkable. I spent over 50 mins on medical decision making.  Continue with conservative  care    Nickolas Kelley MD

## 2023-03-21 NOTE — H&P
T8/T9, and T9/T10. There is enhancement associated with the osteophytes at these levels with corresponding increased STIR signal.  There is also low level enhancement within right aspect of T9 vertebral body with corresponding hypointense signal on T1 weighted imaging and increased STIR signal.  There is edema within the paravertebral soft tissue along right aspect of T6, T7, T8, T9, and T10. No evidence of fracture or malalignment involving the thoracic spine or lumbar spine. There is minimal central disc herniation at T3/T4 resulting in minimal effacement of the ventral cord. There is also mild right neural foraminal narrowing at this level. Mild disc herniations at T10/T11 and T11/T12. No central canal stenosis at these levels. No fracture or malalignment involving the lumbar spine. Postsurgical changes are present with laminectomy at level of L5. No loculated fluid collections within the laminectomy bed. No evidence of epidural abscess or hematoma involving thoracic spine or lumbar spine. Mild circumferential central canal stenosis present at L4/L5 due to facet hypertrophy and circumferential disc bulge. Moderate bilateral neural foraminal narrowing also present at L4/L5. Moderate disc space narrowing at L5/S1. 1. Large bulky osteophytes are seen along the right aspect of T6/T7, T7/T8, T8/T9, and T9/T10. There is edema and enhancement associated with these osteophytes with surrounding soft tissue edema. Findings could suggest recent trauma or repetitive injury. Follow-up MRI recommended to exclude developing osteomyelitis. No evidence of fracture. 2. Additional edema present within right aspect of T9 vertebral body which also may be related to recent trauma or repetitive injury. No evidence of T9 fracture. 3. No significant central canal stenosis involving the thoracic spine. 4. Mild central canal stenosis at L4/L5 with moderate bilateral neural foraminal narrowing.  5. Note made of a right pleural effusion with adjacent opacities likely related to pneumonia. Note made of foci of gas within the pleural effusion of possible infectious etiology. MRI LUMBAR SPINE W WO CONTRAST    Result Date: 3/21/2023  EXAMINATION: MRI OF THE THORACIC SPINE WITHOUT AND WITH CONTRAST; MRI OF THE LUMBAR SPINE WITHOUT AND WITH CONTRAST  3/21/2023 1:40 pm TECHNIQUE: Multiplanar multisequence MRI of the thoracic spine was performed without and with the administration of intravenous contrast.; Multiplanar multisequence MRI of the lumbar spine was performed without and with the administration of intravenous contrast. COMPARISON: Correlation made with view of the lower thoracic spine and lumbar spine from CT abdomen and pelvis performed today. HISTORY: ORDERING SYSTEM PROVIDED HISTORY: pain, herniation t11-12 TECHNOLOGIST PROVIDED HISTORY: Reason for exam:->pain, herniation t11-12 What reading provider will be dictating this exam?->CRC FINDINGS: 20 mL MultiHance utilized Large bulky osteophytes are seen along right aspect of T6/T7, T7/T8, T8/T9, and T9/T10. There is enhancement associated with the osteophytes at these levels with corresponding increased STIR signal.  There is also low level enhancement within right aspect of T9 vertebral body with corresponding hypointense signal on T1 weighted imaging and increased STIR signal.  There is edema within the paravertebral soft tissue along right aspect of T6, T7, T8, T9, and T10. No evidence of fracture or malalignment involving the thoracic spine or lumbar spine. There is minimal central disc herniation at T3/T4 resulting in minimal effacement of the ventral cord. There is also mild right neural foraminal narrowing at this level. Mild disc herniations at T10/T11 and T11/T12. No central canal stenosis at these levels. No fracture or malalignment involving the lumbar spine. Postsurgical changes are present with laminectomy at level of L5.   No loculated fluid collections

## 2023-03-21 NOTE — PROGRESS NOTES
Patient at 85% on entering unit. Applied O2 at 2lpm and immediately improved to 93% No distress. C/O pain on inspiration and deep breathing. No cough.

## 2023-03-21 NOTE — ED NOTES
Re-checked glucose after 1 L NSS and Insulin 10u R. Still reading [facility] on glucometer.       Zoe Wolff RN  03/21/23 7720

## 2023-03-21 NOTE — PROGRESS NOTES
Pharmacy Consultation Note  (Antibiotic Dosing and Monitoring)    Initial consult date: 3/21/23  Consulting physician/provider: Dr. Kate Olivas  Drug: Vancomycin  Indication: Pneumonia (HAP)    Age/  Gender Height Weight IBW  Allergy Information   57 y.o./male 6' 3\" (190.5 cm) 240 lb (108.9 kg)     Ideal body weight: 84.5 kg (186 lb 4.6 oz)  Adjusted ideal body weight: 94.2 kg (207 lb 12.4 oz)   Patient has no known allergies. Renal Function:  Recent Labs     03/21/23  0838   BUN 45*   CREATININE 1.5*     No intake or output data in the 24 hours ending 03/21/23 1654    Vancomycin Monitoring:  Trough:  No results for input(s): VANCOTROUGH in the last 72 hours. Random:  No results for input(s): VANCORANDOM in the last 72 hours. No results for input(s): Princess Barriga in the last 72 hours. Historical Cultures:  No results found for: ORG  No results for input(s): BC in the last 72 hours. Vancomycin Administration Times:  Recent vancomycin administrations        No vancomycin IV orders with administrations found. Assessment:  Patient is a 56 y.o. male who has been initiated on vancomycin  Estimated Creatinine Clearance: 74 mL/min (A) (based on SCr of 1.5 mg/dL (H)). To dose vancomycin, pharmacy will be utilizing [employer] calculation software for goal AUC/MARIELA 400-600 mg/L-hr    Plan:  Vancomycin 2250 mg IV once then vancomycin 1000 mg IV every 12 hours (eAUC 564, eTr 19.4).    Will check vancomycin levels when appropriate - check random level tomorrow morning  Will continue to monitor renal function   Pharmacy to follow      Lisa Chambers [facility] 3/21/2023 4:54 PM

## 2023-03-21 NOTE — ED NOTES
Report called to [facility], RN.   Patient placed in transport     Maulik Rios RN  03/21/23 0615

## 2023-03-21 NOTE — ED PROVIDER NOTES
administration in time range)     Or   acetaminophen (TYLENOL) suppository 650 mg (has no administration in time range)   insulin glargine-yfgn (SEMGLEE-YFGN) injection vial 40 Units (40 Units SubCUTAneous Given 3/21/23 1854)   insulin lispro (HUMALOG) injection vial 0-4 Units (has no administration in time range)   insulin lispro (HUMALOG) injection vial 0-4 Units (has no administration in time range)   insulin lispro (HUMALOG) injection vial 10 Units (has no administration in time range)   cefepime (MAXIPIME) 2,000 mg in sodium chloride 0.9 % 50 mL IVPB (Ywrn8Dud) ( IntraVENous Automatically Held 3/28/23 1000)   perflutren lipid microspheres (DEFINITY) injection 1.5 mL (has no administration in time range)   oxyCODONE (ROXICODONE) immediate release tablet 5 mg (5 mg Oral Given 3/21/23 1832)   HYDROmorphone (DILAUDID) injection 0.25 mg (has no administration in time range)   vancomycin (VANCOCIN) 2,250 mg in sodium chloride 0.9 % 500 mL IVPB (has no administration in time range)   azithromycin (ZITHROMAX) 500 mg in sodium chloride 0.9 % 250 mL IVPB (Rbfp4Qdf) (0 mg IntraVENous Stopped 3/21/23 1939)   vancomycin (VANCOCIN) 1,000 mg in sodium chloride 0.9 % 250 mL IVPB (Ughk4Hna) (has no administration in time range)   glucose chewable tablet 16 g (has no administration in time range)   dextrose bolus 10% 125 mL (has no administration in time range)     Or   dextrose bolus 10% 250 mL (has no administration in time range)   glucagon injection 1 mg (has no administration in time range)   dextrose 10 % infusion (has no administration in time range)   fentaNYL (SUBLIMAZE) injection 25 mcg (25 mcg IntraVENous Given 3/21/23 1058)   ondansetron (ZOFRAN) injection 4 mg (4 mg IntraVENous Given 3/21/23 1101)   0.9 % sodium chloride bolus (0 mLs IntraVENous Stopped 3/21/23 1322)   insulin regular (HUMULIN R;NOVOLIN R) injection 10 Units (10 Units IntraVENous Given 3/21/23 1149)   fentaNYL (SUBLIMAZE) injection 25 mcg (25 mcg will follow the patient, he was made aware the plan will be for medical admit for MATTHIEU, pneumonia, Hyperglycemia. At 1640 Dr. Kenny Cornejo with [facility] called and updated on patients clinical exam and abnormal findings and will admit to his service to tele bed. The patient and his wife were updated throughout the stay and pain medication was provided numerous times. Please note that the withdrawal or failure to initiate urgent interventions for this patient would likely result in a life threatening deterioration or permanent disability. Accordingly this patient received 35 minutes of critical care time, excluding separately billable procedures. Counseling: The emergency provider has spoken with the patient and discussed todays results, in addition to providing specific details for the plan of care and counseling regarding the diagnosis and prognosis. Questions are answered at this time and they are agreeable with the plan.      --------------------------------- IMPRESSION AND DISPOSITION ---------------------------------    IMPRESSION  1. Pneumonia due to infectious organism, unspecified laterality, unspecified part of lung    2. Constipation, unspecified constipation type    3. MATTHIEU (acute kidney injury) ([facility] [address]    4.  Hyperglycemia        DISPOSITION  Disposition: Admit to telemetry  Patient condition is stable                  CHINTAN Gonzalez CNP  03/21/23 2038

## 2023-03-22 PROBLEM — E11.65 UNCONTROLLED TYPE 2 DIABETES MELLITUS WITH HYPERGLYCEMIA (HCC): Status: ACTIVE | Noted: 2023-03-22

## 2023-03-22 LAB
A BAUMANNII DNA BLD POS QL NAA+NON-PROBE: NOT DETECTED
ALBUMIN SERPL-MCNC: 2.9 G/DL (ref 3.5–5.2)
ALP SERPL-CCNC: 101 U/L (ref 40–129)
ALT SERPL-CCNC: 14 U/L (ref 0–40)
ANION GAP SERPL CALCULATED.3IONS-SCNC: 9 MMOL/L (ref 7–16)
AST SERPL-CCNC: 7 U/L (ref 0–39)
BASOPHILS # BLD: 0.02 E9/L (ref 0–0.2)
BASOPHILS NFR BLD: 0.1 % (ref 0–2)
BILIRUB SERPL-MCNC: 0.3 MG/DL (ref 0–1.2)
BOTTLE TYPE: ABNORMAL
BUN SERPL-MCNC: 29 MG/DL (ref 6–20)
C ALBICANS DNA BLD POS QL NAA+NON-PROBE: NOT DETECTED
C AURIS DNA BLD POS QL NAA+PROBE: NOT DETECTED
C GLABRATA DNA BLD POS QL NAA+NON-PROBE: NOT DETECTED
C KRUSEI DNA BLD POS QL NAA+NON-PROBE: NOT DETECTED
C PARAP DNA BLD POS QL NAA+NON-PROBE: NOT DETECTED
C TROPICLS DNA BLD POS QL NAA+NON-PROBE: NOT DETECTED
CALCIUM SERPL-MCNC: 9 MG/DL (ref 8.6–10.2)
CHLORIDE SERPL-SCNC: 96 MMOL/L (ref 98–107)
CO2 SERPL-SCNC: 27 MMOL/L (ref 22–29)
CREAT SERPL-MCNC: 0.9 MG/DL (ref 0.7–1.2)
CRYPTOCOCCUS NEOFORMANS/GATTII BY PCR: NOT DETECTED
E CLOAC COMP DNA BLD POS NAA+NON-PROBE: NOT DETECTED
E COLI DNA BLD POS QL NAA+NON-PROBE: NOT DETECTED
E FAECALIS DNA BLD POS QL NAA+PROBE: NOT DETECTED
E FAECIUM DNA BLD POS QL NAA+PROBE: NOT DETECTED
ENTEROBACT DNA BLD POS QL NAA+NON-PROBE: NOT DETECTED
ENTEROCOC DNA BLD POS QL NAA+NON-PROBE: NOT DETECTED
EOSINOPHIL # BLD: 0 E9/L (ref 0.05–0.5)
EOSINOPHIL NFR BLD: 0 % (ref 0–6)
ERYTHROCYTE [DISTWIDTH] IN BLOOD BY AUTOMATED COUNT: 12 FL (ref 11.5–15)
GLUCOSE SERPL-MCNC: 222 MG/DL (ref 74–99)
GN BLD CULTURE PNL BLD POS NAA+PROBE: NOT DETECTED
HBA1C MFR BLD: 10.5 % (ref 4–5.6)
HCT VFR BLD AUTO: 44.6 % (ref 37–54)
HGB BLD-MCNC: 14.8 G/DL (ref 12.5–16.5)
IMM GRANULOCYTES # BLD: 0.11 E9/L
IMM GRANULOCYTES NFR BLD: 0.7 % (ref 0–5)
K OXYTOCA DNA BLD POS QL NAA+NON-PROBE: NOT DETECTED
K PNEUMON DNA SPEC QL NAA+PROBE: NOT DETECTED
K. AEROGENES DNA SPEC QL NAA+PROBE: NOT DETECTED
L MONOCYTOG DNA BLD POS QL NAA+NON-PROBE: NOT DETECTED
LYMPHOCYTES # BLD: 1.01 E9/L (ref 1.5–4)
LYMPHOCYTES NFR BLD: 6.6 % (ref 20–42)
MCH RBC QN AUTO: 28.7 PG (ref 26–35)
MCHC RBC AUTO-ENTMCNC: 33.2 % (ref 32–34.5)
MCV RBC AUTO: 86.4 FL (ref 80–99.9)
MECA+MECC ISLT/SPM QL: NOT DETECTED
METER GLUCOSE: 212 MG/DL (ref 74–99)
METER GLUCOSE: 212 MG/DL (ref 74–99)
METER GLUCOSE: 268 MG/DL (ref 74–99)
METER GLUCOSE: 290 MG/DL (ref 74–99)
METER GLUCOSE: 345 MG/DL (ref 74–99)
MONOCYTES # BLD: 0.98 E9/L (ref 0.1–0.95)
MONOCYTES NFR BLD: 6.4 % (ref 2–12)
N MEN DNA BLD POS QL NAA+NON-PROBE: NOT DETECTED
NEUTROPHILS # BLD: 13.19 E9/L (ref 1.8–7.3)
NEUTS SEG NFR BLD: 86.2 % (ref 43–80)
ORDER NUMBER: ABNORMAL
P AERUGINOSA DNA BLD POS NAA+NON-PROBE: NOT DETECTED
PLATELET # BLD AUTO: 247 E9/L (ref 130–450)
PMV BLD AUTO: 9.4 FL (ref 7–12)
POTASSIUM SERPL-SCNC: 4.1 MMOL/L (ref 3.5–5)
POTASSIUM SERPL-SCNC: 4.1 MMOL/L (ref 3.5–5)
PROT SERPL-MCNC: 6.3 G/DL (ref 6.4–8.3)
PROTEUS SP DNA BLD POS QL NAA+NON-PROBE: NOT DETECTED
RBC # BLD AUTO: 5.16 E12/L (ref 3.8–5.8)
S AUREUS DNA BLD POS QL NAA+NON-PROBE: DETECTED
S AUREUS+CONS DNA BLD POS NAA+NON-PROBE: DETECTED
S EPIDERMIDIS DNA BLD POS QL NAA+PROBE: NOT DETECTED
S LUGDUNENSIS DNA BLD POS QL NAA+PROBE: NOT DETECTED
S MALTOPH DNA BLD POS QL NAA+PROBE: NOT DETECTED
S MARCESCENS DNA BLD POS NAA+NON-PROBE: NOT DETECTED
S PNEUM DNA BLD POS QL NAA+NON-PROBE: NOT DETECTED
S PYO DNA BLD POS QL NAA+NON-PROBE: NOT DETECTED
SALMONELLA DNA BLD POS QL NAA+PROBE: NOT DETECTED
SODIUM SERPL-SCNC: 132 MMOL/L (ref 132–146)
SOURCE OF BLOOD CULTURE: ABNORMAL
STREPTOCOCCUS AGALACTIAE BY PCR: NOT DETECTED
STREPTOCOCCUS DNA BLD POS NAA+NON-PROBE: NOT DETECTED
VANCOMYCIN SERPL-MCNC: <4 MCG/ML (ref 5–40)
WBC # BLD: 15.3 E9/L (ref 4.5–11.5)

## 2023-03-22 PROCEDURE — 87040 BLOOD CULTURE FOR BACTERIA: CPT

## 2023-03-22 PROCEDURE — 2580000003 HC RX 258: Performed by: HOSPITALIST

## 2023-03-22 PROCEDURE — 97535 SELF CARE MNGMENT TRAINING: CPT

## 2023-03-22 PROCEDURE — 85025 COMPLETE CBC W/AUTO DIFF WBC: CPT

## 2023-03-22 PROCEDURE — 6370000000 HC RX 637 (ALT 250 FOR IP): Performed by: HOSPITALIST

## 2023-03-22 PROCEDURE — 94640 AIRWAY INHALATION TREATMENT: CPT

## 2023-03-22 PROCEDURE — 6370000000 HC RX 637 (ALT 250 FOR IP): Performed by: INTERNAL MEDICINE

## 2023-03-22 PROCEDURE — 82962 GLUCOSE BLOOD TEST: CPT

## 2023-03-22 PROCEDURE — 2580000003 HC RX 258: Performed by: INTERNAL MEDICINE

## 2023-03-22 PROCEDURE — 2580000003 HC RX 258: Performed by: NURSE PRACTITIONER

## 2023-03-22 PROCEDURE — 6360000002 HC RX W HCPCS

## 2023-03-22 PROCEDURE — 80053 COMPREHEN METABOLIC PANEL: CPT

## 2023-03-22 PROCEDURE — 97165 OT EVAL LOW COMPLEX 30 MIN: CPT

## 2023-03-22 PROCEDURE — 97161 PT EVAL LOW COMPLEX 20 MIN: CPT

## 2023-03-22 PROCEDURE — 2700000000 HC OXYGEN THERAPY PER DAY

## 2023-03-22 PROCEDURE — 97530 THERAPEUTIC ACTIVITIES: CPT

## 2023-03-22 PROCEDURE — 94669 MECHANICAL CHEST WALL OSCILL: CPT

## 2023-03-22 PROCEDURE — 83036 HEMOGLOBIN GLYCOSYLATED A1C: CPT

## 2023-03-22 PROCEDURE — 36415 COLL VENOUS BLD VENIPUNCTURE: CPT

## 2023-03-22 PROCEDURE — 80048 BASIC METABOLIC PNL TOTAL CA: CPT

## 2023-03-22 PROCEDURE — 6360000002 HC RX W HCPCS: Performed by: INTERNAL MEDICINE

## 2023-03-22 PROCEDURE — 2580000003 HC RX 258

## 2023-03-22 PROCEDURE — 6360000002 HC RX W HCPCS: Performed by: HOSPITALIST

## 2023-03-22 PROCEDURE — 1200000000 HC SEMI PRIVATE

## 2023-03-22 PROCEDURE — 80202 ASSAY OF VANCOMYCIN: CPT

## 2023-03-22 PROCEDURE — S5553 INSULIN LONG ACTING 5 U: HCPCS | Performed by: HOSPITALIST

## 2023-03-22 PROCEDURE — 94664 DEMO&/EVAL PT USE INHALER: CPT

## 2023-03-22 RX ORDER — BENZONATATE 100 MG/1
100 CAPSULE ORAL 3 TIMES DAILY
Status: DISCONTINUED | OUTPATIENT
Start: 2023-03-22 | End: 2023-03-30 | Stop reason: HOSPADM

## 2023-03-22 RX ORDER — POLYETHYLENE GLYCOL 3350 17 G/17G
17 POWDER, FOR SOLUTION ORAL 2 TIMES DAILY
Status: DISCONTINUED | OUTPATIENT
Start: 2023-03-22 | End: 2023-03-30 | Stop reason: HOSPADM

## 2023-03-22 RX ORDER — POLYETHYLENE GLYCOL 3350 17 G/17G
17 POWDER, FOR SOLUTION ORAL DAILY
Status: DISCONTINUED | OUTPATIENT
Start: 2023-03-22 | End: 2023-03-22

## 2023-03-22 RX ORDER — AMLODIPINE BESYLATE 5 MG/1
5 TABLET ORAL DAILY
Status: DISCONTINUED | OUTPATIENT
Start: 2023-03-22 | End: 2023-03-30 | Stop reason: HOSPADM

## 2023-03-22 RX ORDER — MECOBALAMIN 5000 MCG
5 TABLET,DISINTEGRATING ORAL NIGHTLY PRN
Status: DISCONTINUED | OUTPATIENT
Start: 2023-03-22 | End: 2023-03-30 | Stop reason: HOSPADM

## 2023-03-22 RX ORDER — TIZANIDINE 4 MG/1
4 TABLET ORAL EVERY 6 HOURS PRN
Status: DISCONTINUED | OUTPATIENT
Start: 2023-03-22 | End: 2023-03-30 | Stop reason: HOSPADM

## 2023-03-22 RX ORDER — LACTOBACILLUS RHAMNOSUS GG 10B CELL
1 CAPSULE ORAL 2 TIMES DAILY
Status: DISCONTINUED | OUTPATIENT
Start: 2023-03-22 | End: 2023-03-30 | Stop reason: HOSPADM

## 2023-03-22 RX ORDER — MECOBALAMIN 5000 MCG
5 TABLET,DISINTEGRATING ORAL NIGHTLY
Status: DISCONTINUED | OUTPATIENT
Start: 2023-03-22 | End: 2023-03-30 | Stop reason: HOSPADM

## 2023-03-22 RX ORDER — SODIUM CHLORIDE 9 MG/ML
INJECTION, SOLUTION INTRAVENOUS CONTINUOUS
Status: ACTIVE | OUTPATIENT
Start: 2023-03-22 | End: 2023-03-23

## 2023-03-22 RX ORDER — IPRATROPIUM BROMIDE AND ALBUTEROL SULFATE 2.5; .5 MG/3ML; MG/3ML
1 SOLUTION RESPIRATORY (INHALATION)
Status: DISCONTINUED | OUTPATIENT
Start: 2023-03-22 | End: 2023-03-27 | Stop reason: SDUPTHER

## 2023-03-22 RX ORDER — AZITHROMYCIN 250 MG/1
500 TABLET, FILM COATED ORAL DAILY
Status: DISCONTINUED | OUTPATIENT
Start: 2023-03-22 | End: 2023-03-23

## 2023-03-22 RX ORDER — GUAIFENESIN 400 MG/1
400 TABLET ORAL 3 TIMES DAILY
Status: DISCONTINUED | OUTPATIENT
Start: 2023-03-22 | End: 2023-03-30 | Stop reason: HOSPADM

## 2023-03-22 RX ORDER — ACETAMINOPHEN 500 MG
1000 TABLET ORAL 4 TIMES DAILY
Status: DISCONTINUED | OUTPATIENT
Start: 2023-03-22 | End: 2023-03-27

## 2023-03-22 RX ORDER — PANTOPRAZOLE SODIUM 40 MG/1
40 TABLET, DELAYED RELEASE ORAL
Status: DISCONTINUED | OUTPATIENT
Start: 2023-03-23 | End: 2023-03-30 | Stop reason: HOSPADM

## 2023-03-22 RX ORDER — INSULIN LISPRO 100 [IU]/ML
8 INJECTION, SOLUTION INTRAVENOUS; SUBCUTANEOUS
Status: DISCONTINUED | OUTPATIENT
Start: 2023-03-22 | End: 2023-03-22

## 2023-03-22 RX ORDER — SENNA AND DOCUSATE SODIUM 50; 8.6 MG/1; MG/1
2 TABLET, FILM COATED ORAL EVERY EVENING
Status: DISCONTINUED | OUTPATIENT
Start: 2023-03-22 | End: 2023-03-30 | Stop reason: HOSPADM

## 2023-03-22 RX ORDER — SENNA AND DOCUSATE SODIUM 50; 8.6 MG/1; MG/1
2 TABLET, FILM COATED ORAL 2 TIMES DAILY PRN
Status: DISCONTINUED | OUTPATIENT
Start: 2023-03-22 | End: 2023-03-30 | Stop reason: HOSPADM

## 2023-03-22 RX ORDER — CLONIDINE HYDROCHLORIDE 0.1 MG/1
0.1 TABLET ORAL EVERY 4 HOURS PRN
Status: DISCONTINUED | OUTPATIENT
Start: 2023-03-22 | End: 2023-03-30 | Stop reason: HOSPADM

## 2023-03-22 RX ADMIN — VANCOMYCIN HYDROCHLORIDE 2250 MG: 10 INJECTION, POWDER, LYOPHILIZED, FOR SOLUTION INTRAVENOUS at 11:45

## 2023-03-22 RX ADMIN — POLYETHYLENE GLYCOL 3350 17 G: 17 POWDER, FOR SOLUTION ORAL at 22:08

## 2023-03-22 RX ADMIN — SODIUM CHLORIDE: 9 INJECTION, SOLUTION INTRAVENOUS at 09:50

## 2023-03-22 RX ADMIN — Medication 1 CAPSULE: at 22:09

## 2023-03-22 RX ADMIN — ACETAMINOPHEN 1000 MG: 500 TABLET ORAL at 10:15

## 2023-03-22 RX ADMIN — INSULIN LISPRO 15 UNITS: 100 INJECTION, SOLUTION INTRAVENOUS; SUBCUTANEOUS at 09:05

## 2023-03-22 RX ADMIN — ROSUVASTATIN CALCIUM 20 MG: 20 TABLET, FILM COATED ORAL at 22:08

## 2023-03-22 RX ADMIN — SENNOSIDES AND DOCUSATE SODIUM 2 TABLET: 8.6; 5 TABLET ORAL at 17:26

## 2023-03-22 RX ADMIN — AMLODIPINE BESYLATE 5 MG: 5 TABLET ORAL at 09:51

## 2023-03-22 RX ADMIN — AZITHROMYCIN MONOHYDRATE 500 MG: 250 TABLET ORAL at 10:15

## 2023-03-22 RX ADMIN — GUAIFENESIN 400 MG: 400 TABLET ORAL at 09:52

## 2023-03-22 RX ADMIN — IPRATROPIUM BROMIDE AND ALBUTEROL SULFATE 1 AMPULE: .5; 2.5 SOLUTION RESPIRATORY (INHALATION) at 15:36

## 2023-03-22 RX ADMIN — INSULIN LISPRO 4 UNITS: 100 INJECTION, SOLUTION INTRAVENOUS; SUBCUTANEOUS at 01:21

## 2023-03-22 RX ADMIN — SERTRALINE 100 MG: 100 TABLET, FILM COATED ORAL at 09:51

## 2023-03-22 RX ADMIN — VANCOMYCIN HYDROCHLORIDE 1500 MG: 10 INJECTION, POWDER, LYOPHILIZED, FOR SOLUTION INTRAVENOUS at 23:21

## 2023-03-22 RX ADMIN — BENZONATATE 100 MG: 100 CAPSULE ORAL at 22:08

## 2023-03-22 RX ADMIN — IPRATROPIUM BROMIDE AND ALBUTEROL SULFATE 1 AMPULE: .5; 2.5 SOLUTION RESPIRATORY (INHALATION) at 20:10

## 2023-03-22 RX ADMIN — INSULIN LISPRO 15 UNITS: 100 INJECTION, SOLUTION INTRAVENOUS; SUBCUTANEOUS at 11:46

## 2023-03-22 RX ADMIN — Medication 10 ML: at 22:53

## 2023-03-22 RX ADMIN — IPRATROPIUM BROMIDE AND ALBUTEROL SULFATE 1 AMPULE: .5; 2.5 SOLUTION RESPIRATORY (INHALATION) at 12:22

## 2023-03-22 RX ADMIN — GUAIFENESIN 400 MG: 400 TABLET ORAL at 16:11

## 2023-03-22 RX ADMIN — ENOXAPARIN SODIUM 30 MG: 100 INJECTION SUBCUTANEOUS at 22:07

## 2023-03-22 RX ADMIN — BENZONATATE 100 MG: 100 CAPSULE ORAL at 16:11

## 2023-03-22 RX ADMIN — OXYCODONE HYDROCHLORIDE 5 MG: 5 TABLET ORAL at 05:34

## 2023-03-22 RX ADMIN — ENOXAPARIN SODIUM 30 MG: 100 INJECTION SUBCUTANEOUS at 09:52

## 2023-03-22 RX ADMIN — ACETAMINOPHEN 1000 MG: 500 TABLET ORAL at 16:10

## 2023-03-22 RX ADMIN — SODIUM CHLORIDE: 9 INJECTION, SOLUTION INTRAVENOUS at 05:40

## 2023-03-22 RX ADMIN — INSULIN LISPRO 8 UNITS: 100 INJECTION, SOLUTION INTRAVENOUS; SUBCUTANEOUS at 17:27

## 2023-03-22 RX ADMIN — POLYETHYLENE GLYCOL 3350 17 G: 17 POWDER, FOR SOLUTION ORAL at 10:18

## 2023-03-22 RX ADMIN — INSULIN GLARGINE-YFGN 40 UNITS: 100 INJECTION, SOLUTION SUBCUTANEOUS at 22:09

## 2023-03-22 RX ADMIN — INSULIN GLARGINE-YFGN 40 UNITS: 100 INJECTION, SOLUTION SUBCUTANEOUS at 09:52

## 2023-03-22 RX ADMIN — TIZANIDINE 4 MG: 4 TABLET ORAL at 10:19

## 2023-03-22 RX ADMIN — Medication 1 CAPSULE: at 10:18

## 2023-03-22 RX ADMIN — BENZONATATE 100 MG: 100 CAPSULE ORAL at 09:51

## 2023-03-22 RX ADMIN — DICLOFENAC SODIUM 4 G: 10 GEL TOPICAL at 23:24

## 2023-03-22 RX ADMIN — Medication 5 MG: at 22:07

## 2023-03-22 RX ADMIN — INSULIN LISPRO 4 UNITS: 100 INJECTION, SOLUTION INTRAVENOUS; SUBCUTANEOUS at 09:07

## 2023-03-22 RX ADMIN — ACETAMINOPHEN 1000 MG: 500 TABLET ORAL at 22:07

## 2023-03-22 RX ADMIN — SODIUM CHLORIDE: 9 INJECTION, SOLUTION INTRAVENOUS at 23:19

## 2023-03-22 RX ADMIN — INSULIN LISPRO 15 UNITS: 100 INJECTION, SOLUTION INTRAVENOUS; SUBCUTANEOUS at 17:26

## 2023-03-22 RX ADMIN — INSULIN LISPRO 8 UNITS: 100 INJECTION, SOLUTION INTRAVENOUS; SUBCUTANEOUS at 11:49

## 2023-03-22 RX ADMIN — GUAIFENESIN 400 MG: 400 TABLET ORAL at 22:08

## 2023-03-22 RX ADMIN — OXYCODONE HYDROCHLORIDE 5 MG: 5 TABLET ORAL at 22:31

## 2023-03-22 ASSESSMENT — PAIN DESCRIPTION - ORIENTATION
ORIENTATION: RIGHT;UPPER
ORIENTATION: RIGHT
ORIENTATION: RIGHT
ORIENTATION: RIGHT;MID
ORIENTATION: RIGHT
ORIENTATION: RIGHT
ORIENTATION: RIGHT;MID;POSTERIOR

## 2023-03-22 ASSESSMENT — PAIN SCALES - GENERAL
PAINLEVEL_OUTOF10: 5
PAINLEVEL_OUTOF10: 7
PAINLEVEL_OUTOF10: 5
PAINLEVEL_OUTOF10: 8
PAINLEVEL_OUTOF10: 5
PAINLEVEL_OUTOF10: 0

## 2023-03-22 ASSESSMENT — PAIN DESCRIPTION - LOCATION
LOCATION: ABDOMEN
LOCATION: ABDOMEN
LOCATION: FLANK
LOCATION: BACK
LOCATION: BACK;RIB CAGE
LOCATION: RIB CAGE
LOCATION: BACK

## 2023-03-22 ASSESSMENT — PAIN - FUNCTIONAL ASSESSMENT
PAIN_FUNCTIONAL_ASSESSMENT: PREVENTS OR INTERFERES SOME ACTIVE ACTIVITIES AND ADLS
PAIN_FUNCTIONAL_ASSESSMENT: ACTIVITIES ARE NOT PREVENTED
PAIN_FUNCTIONAL_ASSESSMENT: PREVENTS OR INTERFERES SOME ACTIVE ACTIVITIES AND ADLS

## 2023-03-22 ASSESSMENT — PAIN DESCRIPTION - DESCRIPTORS
DESCRIPTORS: ACHING;DISCOMFORT;DULL
DESCRIPTORS: ACHING;STABBING
DESCRIPTORS: SHARP
DESCRIPTORS: SHARP
DESCRIPTORS: ACHING

## 2023-03-22 NOTE — PROGRESS NOTES
Reason for consult:  uncontrolled DM    A1C: 10.5% (3/22/23)     [] Not available            Patient states the following concerns/barriers to diabetes self-management:     [x] None       [] Medication cost   [] Food cost/availability        [] Reading  [] Hearing   [] Vision                [] Work    [] Transportation  [] No insurance  [] Physical limitations    [] Other:    Patient states the following about their diabetes/health:   [x]   Pt states he has been diagnosed with diabetes for about 20 years  [x]   Pt has a glucometer at home  [x]    Pt eats 3 times daily, drinks tea with 1/2 sugar  [x]   On oral diabetes medication  (metformin) , injected medication (ozempic) and insulin (novolog, meal time insulin)    Diabetes survival packet provided to:   [x] Patient     [x] Other:  wife present    Information Provided in survival packet:   Definition of diabetes   Target glucose ranges/A1C   Self-monitoring of blood glucose   Prevention/symptoms/treatment of hypo-/hyperglycemia   Medication adherence   The plate method/meal planning guidelines   The benefits of exercise and recommendations   Reducing the risk of chronic complications          Post-education Assessment  []  Attentive to teaching  []  Answered questions appropriately when asked   []  Seems able to apply concepts to daily lifestyle  []  Seems motivated to do well  []  Verbalized an understanding of the plate method for portion control   []  Verbalized an understanding of prescribed antidiabetes medications   []  Verbalized an understanding of target glucose ranges/A1C level  []  Expresses an intent to comply with treatment plan   []  Showed very little interest in complying with treatment plan   []  Seems to have trouble applying concepts to daily lifestyle       COMMENTS:  Pt felt overwhelmed with everyone coming into his room, patient declined diabetes education states he knows what to do.            Recommendations:   [x] Carbohydrate-controlled

## 2023-03-22 NOTE — CARE COORDINATION
3/22 Care Coordination: Pt presenting to the ED for low back pain  upper abdominal pain, beginning week agoCM/SW will continue to follow for discharge planning.    Sheba KILLIAN,RN-CV-BC  [phone]

## 2023-03-22 NOTE — PROGRESS NOTES
ADDENDUM:     .Pharmacy Consultation Note  (Antibiotic Dosing and Monitoring)    Re-consult date: 3/22/2023  Consulting physician/provider: Dr. Sabi Arguello MD   Drug: Vancomycin  Indication: Bacteremia     Age/  Gender Height Weight IBW  Allergy Information   57 y.o./male 6' 3\" (190.5 cm) 240 lb (108.9 kg)     Ideal body weight: 84.5 kg (186 lb 4.6 oz)  Adjusted ideal body weight: 94.2 kg (207 lb 12.4 oz)   Patient has no known allergies. Renal Function:  Recent Labs     03/21/23  0838 03/22/23  0607   BUN 45* 29*   CREATININE 1.5* 0.9     No intake or output data in the 24 hours ending 03/22/23 1111    Vancomycin Monitoring:  Trough:  No results for input(s): VANCOTROUGH in the last 72 hours. Random:    Recent Labs     03/22/23  0607   VANCORANDOM <4.0*       Recent Labs     03/21/23  1637   BLOODCULT2 Gram stain performed from blood culture bottle media  Gram positive cocci in clusters  *        Historical Cultures:  No results found for: ORG  No results for input(s): BC in the last 72 hours. Vancomycin Administration Times:  Recent vancomycin administrations        No vancomycin IV orders with administrations found. Orders not given:            vancomycin (VANCOCIN) 2,250 mg in sodium chloride 0.9 % 500 mL IVPB    vancomycin 1500 mg in sodium chloride 0.9% 300 mL IVPB                 Assessment:  Patient is a 56 y.o. male who has been initiated on vancomycin for bacteremia. Estimated Creatinine Clearance: 124 mL/min (based on SCr of 0.9 mg/dL). To dose vancomycin, pharmacy will be utilizing [employer] calculation software for goal AUC/MARIELA 400-600 mg/L-hr  3/22: Scr: 0.9 mg/dL. Leukocytosis = 15.3 (22.4 yesterday)    Plan: Will give a loading dose of vancomycin 2,250 mg IV X 1 followed by a maintenance dose of vancomycin 1,500 mg IV Q12H to begin at 2330 tonight [e AUC: 575 mg/L*hr; Trough: 18.4 mcg/mL]. Will check vancomycin levels when appropriate.    Will continue to monitor renal function. Pharmacy to follow. Thank you for the consult,     Mao Gabriel PharmD, [address]  PGY1 Pharmacy Resident     3/22/2023 11:20 AM      Pharmacy Consultation Note  (Antibiotic Dosing and Monitoring)    Initial consult date: 3/21/23  Consulting physician/provider: Dr. Vincent Moncada  Drug: Vancomycin  Indication: Pneumonia (HAP)    Vancomycin has been discontinued. Clinical Pharmacy to sign-off. Physician to re-consult pharmacy if future dosing is needed.     Thank you for the consult,     Mao Gabriel PharmD, [address]  PGY1 Pharmacy Resident     3/22/2023 10:13 AM

## 2023-03-22 NOTE — PROGRESS NOTES
Physical Therapy  Physical Therapy Initial Assessment     Name: Laura Ramírez  : 1965  MRN: [id number]      Date of Service: 3/22/2023    Evaluating PT:  Delores Hong, PT, DPT [id number]    Room #:  9123/9640-J  Diagnosis:  Back pain associated with peripheral numbness [M54.9, R20.0]  PMHx/PSHx:    Past Medical History:   Diagnosis Date    Diabetes mellitus ([facility] [address]      Procedure/Surgery:  None  Precautions:  Falls, O2  Equipment Needs:  none    SUBJECTIVE:    Pt lives with wife in a 2 story home with 3 stairs to enter and 1 rail. Full flight of steps and 1 rail to bedroom. Pt ambulated without device and was independent PTA. OBJECTIVE:   Initial Evaluation  Date: 3/22/23 Treatment Short Term/ Long Term   Goals   AM-PAC 6 Clicks 98/71     Was pt agreeable to Eval/treatment? Yes     Does pt have pain?  5/10 R rib/flank pain     Bed Mobility  Rolling: Independent  Supine to sit: SBA  Sit to supine: SBA  Scooting: SBA  Mod Independent   Transfers Sit to stand: SBA  Stand to sit: SBA  Stand pivot: SBA no device  Independent   Ambulation   80 feet x 2 reps with SBA no device  >400 feet Independently   Stair negotiation: ascended and descended 10 steps with 1 rail SBA  >10 steps with 1 rail Mod Independent   ROM BUE:  WFL  BLE:  WFL     Strength BUE:  WFL  BLE:  4/5  Increase by 1/3 MMT grade   Balance Sitting EOB:  Independent  Dynamic Standing:  SBA no device  Sitting EOB:  -  Dynamic Standing:  Independent     Pt is A & O x 4  Sensation:  no reported paresthesias  Edema:  none    Therapeutic Exercises:  NA    Patient education  Pt educated on safety    Patient response to education:   Pt verbalized understanding Pt demonstrated skill Pt requires further education in this area   x x reinforce     ASSESSMENT:    Conditions Requiring Skilled Therapeutic Intervention:    []Decreased strength     []Decreased ROM  [x]Decreased functional mobility  [x]Decreased balance   [x]Decreased endurance   []Decreased independence with functional mobility   [x] Balance Training to improve static/dynamic balance and to reduce fall risk  [x] Endurance Training to improve activity tolerance during functional mobility   [x] Transfer Training to improve safety and independence with all functional transfers   [x] Gait Training to improve gait mechanics, endurance and asses need for appropriate assistive device  [x] Stair Training in preparation for safe discharge home and/or into the community   [] Positioning to prevent skin breakdown and contractures  [x] Safety and Education Training   [x] Patient/Caregiver Education   [] HEP  [] Other     PT long term treatment goals are located in above grid    Frequency of treatments: 2-5x/week x 1-2 weeks. Time in  1000  Time out  1023    Total Treatment Time  8 minutes     Evaluation Time includes thorough review of current medical information, gathering information on past medical history/social history and prior level of function, completion of standardized testing/informal observation of tasks, assessment of data and education on plan of care and goals.     CPT codes:  [x] Low Complexity PT evaluation 90372  [] Moderate Complexity PT evaluation 58664  [] High Complexity PT evaluation 50484  [] PT Re-evaluation 25935  [] Gait training 50823 - minutes  [] Manual therapy 23311 - minutes  [x] Therapeutic activities 82086 8 minutes  [] Therapeutic exercises 56638 - minutes  [] Neuromuscular reeducation 61024 - minutes     Ami Ta, PT, DPT  [id number]

## 2023-03-22 NOTE — PROGRESS NOTES
bilateral neural   foraminal narrowing. 5. Note made of a right pleural effusion with adjacent opacities likely   related to pneumonia. Note made of foci of gas within the pleural effusion   of possible infectious etiology.      Patient Active Problem List   Diagnosis    Lumbar stenosis with neurogenic claudication    Back pain associated with peripheral numbness    Uncontrolled type 2 diabetes mellitus with hyperglycemia (HCC)        Medications:  Reviewed    Infusion Medications    sodium chloride Stopped (03/22/23 1146)    sodium chloride      dextrose       Scheduled Medications    lactobacillus  1 capsule Oral BID    sennosides-docusate sodium  2 tablet Oral QPM    acetaminophen  1,000 mg Oral 4x daily    diclofenac sodium  4 g Topical BID    azithromycin  500 mg Oral Daily    guaiFENesin  400 mg Oral TID    benzonatate  100 mg Oral TID    ipratropium-albuterol  1 ampule Inhalation Q4H WA    amLODIPine  5 mg Oral Daily    polyethylene glycol  17 g Oral BID    melatonin  5 mg Oral Nightly    [START ON 3/23/2023] pantoprazole  40 mg Oral QAM AC    vancomycin  2,250 mg IntraVENous Once    vancomycin  1,500 mg IntraVENous Q12H    rosuvastatin  20 mg Oral Nightly    sertraline  100 mg Oral Daily    sodium chloride flush  10 mL IntraVENous 2 times per day    enoxaparin  30 mg SubCUTAneous BID    insulin glargine  40 Units SubCUTAneous BID    [Held by provider] cefepime  2,000 mg IntraVENous q8h    insulin lispro  0-16 Units SubCUTAneous TID WC    insulin lispro  15 Units SubCUTAneous TID WC    insulin lispro  0-4 Units SubCUTAneous Nightly     PRN Meds: sennosides-docusate sodium, tiZANidine, cloNIDine, melatonin, perflutren lipid microspheres, sodium chloride flush, sodium chloride, promethazine **OR** ondansetron, perflutren lipid microspheres, oxyCODONE, glucose, dextrose bolus **OR** dextrose bolus, glucagon (rDNA), dextrose    I/O  No intake or output data in the 24 hours ending 03/22/23 1236    Labs:   Recent Labs     03/21/23  0838 03/22/23  0607   WBC 22.4* 15.3*   HGB 16.3 14.8   HCT 48.8 44.6    247       Recent Labs     03/21/23  0838 03/22/23  0607   * 132   K 5.0 4.1  4.1   CL 85* 96*   CO2 29 27   BUN 45* 29*   CREATININE 1.5* 0.9   CALCIUM 9.7 9.0       Recent Labs     03/21/23  0838 03/22/23  0607   PROT 7.3 6.3*   ALKPHOS 156* 101   ALT 19 14   AST 5 7   BILITOT 0.8 0.3   LIPASE 75*  --        No results for input(s): INR in the last 72 hours. No results for input(s): Bianka Bigness in the last 72 hours. Chronic labs:  Lab Results   Component Value Date    CHOL 136 11/13/2017    TRIG 281 (H) 11/13/2017    HDL 26 (L) 11/13/2017    TSH 3.580 11/13/2017    INR 1.0 08/19/2022    LABA1C 10.5 (H) 03/22/2023       Radiology:  Imaging studies reviewed today. Subjective:     Felipe Elise feels much better and wants to go home. No pain    Objective:     Physical Exam:   /77   Pulse 91   Temp 97.2 °F (36.2 °C) (Temporal)   Resp 15   Ht 6' 3\" (1.905 m)   Wt 240 lb (108.9 kg)   SpO2 93%   BMI 30.00 kg/m²     General appearance:in no distress, up in bed  Lungs: Clear to auscultation bilaterally, no wheezing or crackles   Heart: Regular rate and rhythm, S1, S2 normal   Abdomen: Soft, non-tender and not-distended.  Bowel sounds normal.   Extremities: no edema   Skin: Skin color, texture, turgor normal. No rashes or lesions   Neurologic: Grossly normal and non focal, CN II-XII intact       Ashok Walter MD   Hospitalist Service   03/22/23 12:36 PM

## 2023-03-22 NOTE — PROGRESS NOTES
[address]        Date:3/22/2023                                                  Patient Name: Donny Stafford    MRN: [id number]    : 1965    Room: [address] [place] [address]          Evaluating OT: Neeta Brown OTR/L; [id number]       Referring Provider: Rylee Liu MD    Specific Provider Orders/Date: OT Eval and Treat 23       Diagnosis: Back pain associated with peripheral numbness     Surgery: None this admission    Pertinent Medical History:  has a past medical history of Diabetes mellitus ([facility] [address]      Recommended Adaptive Equipment: TBD     Precautions:  Fall Risk, spinal neutrality, O2     Assessment of current deficits    [x] Functional mobility  [x]ADLs  [x] Strength               []Cognition    [x] Functional transfers   [x] IADLs         [x] Safety Awareness   [x]Endurance    [x] Fine Coordination              [x] Balance      [] Vision/perception   []Sensation     []Gross Motor Coordination  [] ROM  [] Delirium                   [] Motor Control     OT PLAN OF CARE   OT POC based on physician orders, patient diagnosis and results of clinical assessment    Frequency/Duration 1-3 days/wk for 2 weeks PRN   Specific OT Treatment Interventions to include:   * Instruction/training on adapted ADL techniques and AE recommendations to increase functional independence within precautions       * Training on energy conservation strategies, correct breathing pattern and techniques to improve independence/tolerance for self-care routine  * Functional transfer/mobility training/DME recommendations for increased independence, safety, and fall prevention  * Patient/Family education to increase follow through with safety techniques and functional independence  * Recommendation of environmental modifications for increased safety with functional transfers/mobility and ADLs  * compliance of maintaining spinal neutrality & pain management strategies throughout ADLs. Mobility-  Instruction/training on safety and improved independence with bed mobility/functional transfers and functional mobility. Pt participate in functional transfers/mobility w/o use of AD - pt w/ mild c/o R flank pain. Sitting EOB ~8 minutes to improve dynamic sitting balance and activity tolerance during ADLs. Activity tolerance- Instruction/training on energy conservation/work simplification for completion of ADLs & implementation of pain management strategies. Skilled positioning/alignment-  Proper Positioning/Alignment. Pt required min verbal cues to maintain proper body mechanics/spinal neutrality throughout session to promote skin/joint integrity, safety awareness, & implementation of fall prevention strategies throughout daily activities. Rehab Potential: Good for established goals     LTG: maximize independence with ADLs to return to PLOF    Patient and/or family were instructed on functional diagnosis, prognosis/goals and OT plan of care. Demonstrated fair understanding. Eval Complexity: Low  History: Expanded chart review of medical records and additional review of physical, cognitive, or psychosocial history related to current functional performance  Exam: 3+ performance deficits  Assistance/Modification: Min/mod assistance or modifications required to perform tasks. May have comorbidities that affect occupational performance. Evaluation time includes thorough review of current medical information, gathering information on past medical & social history & PLOF, completion of standardized testing, informal observation of tasks, consultation with other medical professions/disciplines, assessment of data & development of POC/goals.      Time In: 10:10a  Time Out: 10:33a  Total Treatment Time: 8 minutes    Min Units   OT Eval Low 72748  x     OT Eval Medium 21648      OT Eval High 59362      OT

## 2023-03-22 NOTE — CARE COORDINATION
Financial resources:    Current community resources:    Current services prior to admission: None            Current DME:              Type of Home Care services:  None    ADLS  Prior functional level: Independent in ADLs/IADLs  Current functional level: Independent in ADLs/IADLs    PT AM-PAC: 19 /24  OT AM-PAC: 23 /24    Family can provide assistance at DC: Yes  Would you like Case Management to discuss the discharge plan with any other family members/significant others, and if so, who? Yes (Wife)  Plans to Return to Present Housing: Yes  Other Identified Issues/Barriers to RETURNING to current housing: N/A  Potential Assistance needed at discharge: N/A            Potential DME:    Patient expects to discharge to: [address] [place] [address] for transportation at discharge:      Financial    Payor: [address] [facility] / Plan: [address] [place] [address] / Product Type: *No Product type* /     Does insurance require precert for SNF: Yes    Potential assistance Purchasing Medications: No  Meds-to-Beds request: Yes      [facility] [address] [place] [address]  Phone: [phone] Fax: [phone]      Notes:    Factors facilitating achievement of predicted outcomes: Family support    Barriers to discharge: N/A    Additional Case Management Notes: See Avoce    The Plan for Transition of Care is related to the following treatment goals of Back pain associated with peripheral numbness [M54.9, L73.6]    IF APPLICABLE: The Patient and/or patient representative Alexandro Bran and his family were provided with a choice of provider and agrees with the discharge plan. Freedom of choice list with basic dialogue that supports the patient's individualized plan of care/goals and shares the quality data associated with the providers was provided to:     Patient Representative Name:       The Patient and/or Patient Representative Agree with the Discharge Plan? Sonja Johnson, [place]  Case Management Department  Ph: [phone] Fax: N/A

## 2023-03-23 ENCOUNTER — APPOINTMENT (OUTPATIENT)
Dept: CT IMAGING | Age: 58
DRG: 853 | End: 2023-03-23
Payer: OTHER MISCELLANEOUS

## 2023-03-23 PROBLEM — E55.9 VITAMIN D DEFICIENCY: Status: ACTIVE | Noted: 2023-03-23

## 2023-03-23 PROBLEM — R07.81 PLEURITIC CHEST PAIN: Status: ACTIVE | Noted: 2023-03-23

## 2023-03-23 PROBLEM — R78.81 BACTEREMIA: Status: ACTIVE | Noted: 2023-03-23

## 2023-03-23 LAB
ALBUMIN SERPL-MCNC: 2.7 G/DL (ref 3.5–5.2)
ALP SERPL-CCNC: 104 U/L (ref 40–129)
ALT SERPL-CCNC: 32 U/L (ref 0–40)
ANION GAP SERPL CALCULATED.3IONS-SCNC: 8 MMOL/L (ref 7–16)
AST SERPL-CCNC: 28 U/L (ref 0–39)
BASOPHILS # BLD: 0.03 E9/L (ref 0–0.2)
BASOPHILS NFR BLD: 0.3 % (ref 0–2)
BILIRUB SERPL-MCNC: 0.3 MG/DL (ref 0–1.2)
BUN SERPL-MCNC: 20 MG/DL (ref 6–20)
CALCIUM SERPL-MCNC: 8.1 MG/DL (ref 8.6–10.2)
CHLORIDE SERPL-SCNC: 102 MMOL/L (ref 98–107)
CO2 SERPL-SCNC: 28 MMOL/L (ref 22–29)
CREAT SERPL-MCNC: 0.8 MG/DL (ref 0.7–1.2)
EOSINOPHIL # BLD: 0 E9/L (ref 0.05–0.5)
EOSINOPHIL NFR BLD: 0 % (ref 0–6)
ERYTHROCYTE [DISTWIDTH] IN BLOOD BY AUTOMATED COUNT: 11.9 FL (ref 11.5–15)
FOLATE SERPL-MCNC: 5.7 NG/ML (ref 4.8–24.2)
GLUCOSE SERPL-MCNC: 162 MG/DL (ref 74–99)
HCT VFR BLD AUTO: 40.4 % (ref 37–54)
HGB BLD-MCNC: 13.7 G/DL (ref 12.5–16.5)
IMM GRANULOCYTES # BLD: 0.07 E9/L
IMM GRANULOCYTES NFR BLD: 0.6 % (ref 0–5)
IRON SATN MFR SERPL: 14 % (ref 20–55)
IRON SERPL-MCNC: 22 MCG/DL (ref 59–158)
LV EF: 63 %
LVEF MODALITY: NORMAL
LYMPHOCYTES # BLD: 0.82 E9/L (ref 1.5–4)
LYMPHOCYTES NFR BLD: 7.1 % (ref 20–42)
MAGNESIUM SERPL-MCNC: 2.1 MG/DL (ref 1.6–2.6)
MCH RBC QN AUTO: 29.1 PG (ref 26–35)
MCHC RBC AUTO-ENTMCNC: 33.9 % (ref 32–34.5)
MCV RBC AUTO: 86 FL (ref 80–99.9)
METER GLUCOSE: 161 MG/DL (ref 74–99)
METER GLUCOSE: 163 MG/DL (ref 74–99)
METER GLUCOSE: 331 MG/DL (ref 74–99)
MONOCYTES # BLD: 0.77 E9/L (ref 0.1–0.95)
MONOCYTES NFR BLD: 6.7 % (ref 2–12)
NEUTROPHILS # BLD: 9.8 E9/L (ref 1.8–7.3)
NEUTS SEG NFR BLD: 85.3 % (ref 43–80)
PLATELET # BLD AUTO: 182 E9/L (ref 130–450)
PMV BLD AUTO: 9.5 FL (ref 7–12)
POTASSIUM SERPL-SCNC: 3.8 MMOL/L (ref 3.5–5)
PROCALCITONIN: 1.25 NG/ML (ref 0–0.08)
PROT SERPL-MCNC: 5.7 G/DL (ref 6.4–8.3)
RBC # BLD AUTO: 4.7 E12/L (ref 3.8–5.8)
SODIUM SERPL-SCNC: 138 MMOL/L (ref 132–146)
TIBC SERPL-MCNC: 154 MCG/DL (ref 250–450)
TSH SERPL-MCNC: 0.89 UIU/ML (ref 0.27–4.2)
VIT B12 SERPL-MCNC: 777 PG/ML (ref 211–946)
VITAMIN D 25-HYDROXY: 18 NG/ML (ref 30–100)
WBC # BLD: 11.5 E9/L (ref 4.5–11.5)

## 2023-03-23 PROCEDURE — 80053 COMPREHEN METABOLIC PANEL: CPT

## 2023-03-23 PROCEDURE — 84443 ASSAY THYROID STIM HORMONE: CPT

## 2023-03-23 PROCEDURE — 2580000003 HC RX 258

## 2023-03-23 PROCEDURE — 94667 MNPJ CHEST WALL 1ST: CPT

## 2023-03-23 PROCEDURE — 83540 ASSAY OF IRON: CPT

## 2023-03-23 PROCEDURE — 85025 COMPLETE CBC W/AUTO DIFF WBC: CPT

## 2023-03-23 PROCEDURE — 6370000000 HC RX 637 (ALT 250 FOR IP): Performed by: INTERNAL MEDICINE

## 2023-03-23 PROCEDURE — 6370000000 HC RX 637 (ALT 250 FOR IP): Performed by: HOSPITALIST

## 2023-03-23 PROCEDURE — 83550 IRON BINDING TEST: CPT

## 2023-03-23 PROCEDURE — S5553 INSULIN LONG ACTING 5 U: HCPCS | Performed by: HOSPITALIST

## 2023-03-23 PROCEDURE — 6360000004 HC RX CONTRAST MEDICATION: Performed by: RADIOLOGY

## 2023-03-23 PROCEDURE — 6360000002 HC RX W HCPCS

## 2023-03-23 PROCEDURE — 93306 TTE W/DOPPLER COMPLETE: CPT

## 2023-03-23 PROCEDURE — 6360000002 HC RX W HCPCS: Performed by: HOSPITALIST

## 2023-03-23 PROCEDURE — 6360000002 HC RX W HCPCS: Performed by: INTERNAL MEDICINE

## 2023-03-23 PROCEDURE — 82306 VITAMIN D 25 HYDROXY: CPT

## 2023-03-23 PROCEDURE — 82607 VITAMIN B-12: CPT

## 2023-03-23 PROCEDURE — 36415 COLL VENOUS BLD VENIPUNCTURE: CPT

## 2023-03-23 PROCEDURE — 2580000003 HC RX 258: Performed by: HOSPITALIST

## 2023-03-23 PROCEDURE — 82746 ASSAY OF FOLIC ACID SERUM: CPT

## 2023-03-23 PROCEDURE — 71260 CT THORAX DX C+: CPT

## 2023-03-23 PROCEDURE — 83735 ASSAY OF MAGNESIUM: CPT

## 2023-03-23 PROCEDURE — 84145 PROCALCITONIN (PCT): CPT

## 2023-03-23 PROCEDURE — 1200000000 HC SEMI PRIVATE

## 2023-03-23 PROCEDURE — 2700000000 HC OXYGEN THERAPY PER DAY

## 2023-03-23 PROCEDURE — 94640 AIRWAY INHALATION TREATMENT: CPT

## 2023-03-23 PROCEDURE — 82962 GLUCOSE BLOOD TEST: CPT

## 2023-03-23 PROCEDURE — 2580000003 HC RX 258: Performed by: INTERNAL MEDICINE

## 2023-03-23 PROCEDURE — 87449 NOS EACH ORGANISM AG IA: CPT

## 2023-03-23 RX ORDER — ERGOCALCIFEROL 1.25 MG/1
50000 CAPSULE ORAL WEEKLY
Status: DISCONTINUED | OUTPATIENT
Start: 2023-03-23 | End: 2023-03-30 | Stop reason: HOSPADM

## 2023-03-23 RX ORDER — KETOROLAC TROMETHAMINE 30 MG/ML
30 INJECTION, SOLUTION INTRAMUSCULAR; INTRAVENOUS ONCE
Status: COMPLETED | OUTPATIENT
Start: 2023-03-23 | End: 2023-03-23

## 2023-03-23 RX ORDER — CHOLECALCIFEROL (VITAMIN D3) 50 MCG
2000 TABLET ORAL DAILY
Status: DISCONTINUED | OUTPATIENT
Start: 2023-03-23 | End: 2023-03-26

## 2023-03-23 RX ADMIN — OXYCODONE HYDROCHLORIDE 5 MG: 5 TABLET ORAL at 10:56

## 2023-03-23 RX ADMIN — AZITHROMYCIN MONOHYDRATE 500 MG: 250 TABLET ORAL at 10:19

## 2023-03-23 RX ADMIN — Medication 5 MG: at 21:18

## 2023-03-23 RX ADMIN — OXYCODONE HYDROCHLORIDE 5 MG: 5 TABLET ORAL at 06:54

## 2023-03-23 RX ADMIN — INSULIN GLARGINE-YFGN 40 UNITS: 100 INJECTION, SOLUTION SUBCUTANEOUS at 10:22

## 2023-03-23 RX ADMIN — IOPAMIDOL 75 ML: 755 INJECTION, SOLUTION INTRAVENOUS at 19:12

## 2023-03-23 RX ADMIN — GUAIFENESIN 400 MG: 400 TABLET ORAL at 10:20

## 2023-03-23 RX ADMIN — GUAIFENESIN 400 MG: 400 TABLET ORAL at 21:18

## 2023-03-23 RX ADMIN — BENZONATATE 100 MG: 100 CAPSULE ORAL at 21:18

## 2023-03-23 RX ADMIN — BENZONATATE 100 MG: 100 CAPSULE ORAL at 14:49

## 2023-03-23 RX ADMIN — SODIUM CHLORIDE, PRESERVATIVE FREE 10 ML: 5 INJECTION INTRAVENOUS at 16:42

## 2023-03-23 RX ADMIN — OXYCODONE HYDROCHLORIDE 5 MG: 5 TABLET ORAL at 21:18

## 2023-03-23 RX ADMIN — INSULIN LISPRO 15 UNITS: 100 INJECTION, SOLUTION INTRAVENOUS; SUBCUTANEOUS at 12:18

## 2023-03-23 RX ADMIN — POLYETHYLENE GLYCOL 3350 17 G: 17 POWDER, FOR SOLUTION ORAL at 10:18

## 2023-03-23 RX ADMIN — INSULIN LISPRO 12 UNITS: 100 INJECTION, SOLUTION INTRAVENOUS; SUBCUTANEOUS at 12:10

## 2023-03-23 RX ADMIN — SERTRALINE 100 MG: 100 TABLET, FILM COATED ORAL at 10:19

## 2023-03-23 RX ADMIN — ENOXAPARIN SODIUM 30 MG: 100 INJECTION SUBCUTANEOUS at 21:39

## 2023-03-23 RX ADMIN — OXYCODONE HYDROCHLORIDE 5 MG: 5 TABLET ORAL at 14:49

## 2023-03-23 RX ADMIN — INSULIN GLARGINE-YFGN 40 UNITS: 100 INJECTION, SOLUTION SUBCUTANEOUS at 21:19

## 2023-03-23 RX ADMIN — Medication 1 CAPSULE: at 10:20

## 2023-03-23 RX ADMIN — IPRATROPIUM BROMIDE AND ALBUTEROL SULFATE 1 AMPULE: .5; 2.5 SOLUTION RESPIRATORY (INHALATION) at 10:41

## 2023-03-23 RX ADMIN — PANTOPRAZOLE SODIUM 40 MG: 40 TABLET, DELAYED RELEASE ORAL at 06:52

## 2023-03-23 RX ADMIN — ROSUVASTATIN CALCIUM 20 MG: 20 TABLET, FILM COATED ORAL at 21:18

## 2023-03-23 RX ADMIN — INSULIN LISPRO 15 UNITS: 100 INJECTION, SOLUTION INTRAVENOUS; SUBCUTANEOUS at 10:31

## 2023-03-23 RX ADMIN — BENZONATATE 100 MG: 100 CAPSULE ORAL at 10:20

## 2023-03-23 RX ADMIN — ACETAMINOPHEN 1000 MG: 500 TABLET ORAL at 21:18

## 2023-03-23 RX ADMIN — VANCOMYCIN HYDROCHLORIDE 1500 MG: 10 INJECTION, POWDER, LYOPHILIZED, FOR SOLUTION INTRAVENOUS at 11:57

## 2023-03-23 RX ADMIN — AMLODIPINE BESYLATE 5 MG: 5 TABLET ORAL at 10:20

## 2023-03-23 RX ADMIN — ENOXAPARIN SODIUM 30 MG: 100 INJECTION SUBCUTANEOUS at 10:25

## 2023-03-23 RX ADMIN — CEFAZOLIN 2000 MG: 2 INJECTION, POWDER, FOR SOLUTION INTRAMUSCULAR; INTRAVENOUS at 16:42

## 2023-03-23 RX ADMIN — SODIUM CHLORIDE, PRESERVATIVE FREE 10 ML: 5 INJECTION INTRAVENOUS at 11:59

## 2023-03-23 RX ADMIN — KETOROLAC TROMETHAMINE 30 MG: 30 INJECTION, SOLUTION INTRAMUSCULAR at 12:02

## 2023-03-23 RX ADMIN — Medication 1 CAPSULE: at 21:18

## 2023-03-23 RX ADMIN — ACETAMINOPHEN 1000 MG: 500 TABLET ORAL at 10:20

## 2023-03-23 ASSESSMENT — PAIN DESCRIPTION - DESCRIPTORS
DESCRIPTORS: ACHING
DESCRIPTORS: SHOOTING;SHARP;STABBING
DESCRIPTORS: ACHING
DESCRIPTORS: ACHING;PRESSURE;SHARP
DESCRIPTORS: SHARP;ACHING;DISCOMFORT
DESCRIPTORS: ACHING;SORE;SPASM

## 2023-03-23 ASSESSMENT — PAIN SCALES - GENERAL
PAINLEVEL_OUTOF10: 7
PAINLEVEL_OUTOF10: 6
PAINLEVEL_OUTOF10: 5
PAINLEVEL_OUTOF10: 6
PAINLEVEL_OUTOF10: 0
PAINLEVEL_OUTOF10: 4

## 2023-03-23 ASSESSMENT — PAIN DESCRIPTION - ORIENTATION
ORIENTATION: RIGHT;ANTERIOR
ORIENTATION: RIGHT
ORIENTATION: RIGHT
ORIENTATION: RIGHT;ANTERIOR

## 2023-03-23 ASSESSMENT — PAIN DESCRIPTION - LOCATION
LOCATION: RIB CAGE
LOCATION: FLANK
LOCATION: RIB CAGE

## 2023-03-23 NOTE — PROGRESS NOTES
repetitive injury. Follow-up MRI recommended to exclude   developing osteomyelitis. No evidence of fracture. 2. Additional edema present within right aspect of T9 vertebral body which   also may be related to recent trauma or repetitive injury. No evidence of T9   fracture. 3. No significant central canal stenosis involving the thoracic spine. 4. Mild central canal stenosis at L4/L5 with moderate bilateral neural   foraminal narrowing. 5. Note made of a right pleural effusion with adjacent opacities likely   related to pneumonia. Note made of foci of gas within the pleural effusion   of possible infectious etiology.      Patient Active Problem List   Diagnosis    Lumbar stenosis with neurogenic claudication    Back pain associated with peripheral numbness    Uncontrolled type 2 diabetes mellitus with hyperglycemia (HCC)    Vitamin D deficiency    Pleuritic chest pain    Bacteremia        Medications:  Reviewed    Infusion Medications    sodium chloride      dextrose       Scheduled Medications    vitamin D  50,000 Units Oral Weekly    vitamin D  2,000 Units Oral Daily    ketorolac  30 mg IntraVENous Once    lactobacillus  1 capsule Oral BID    sennosides-docusate sodium  2 tablet Oral QPM    acetaminophen  1,000 mg Oral 4x daily    diclofenac sodium  4 g Topical BID    azithromycin  500 mg Oral Daily    guaiFENesin  400 mg Oral TID    benzonatate  100 mg Oral TID    ipratropium-albuterol  1 ampule Inhalation Q4H WA    amLODIPine  5 mg Oral Daily    polyethylene glycol  17 g Oral BID    melatonin  5 mg Oral Nightly    pantoprazole  40 mg Oral QAM AC    vancomycin  1,500 mg IntraVENous Q12H    rosuvastatin  20 mg Oral Nightly    sertraline  100 mg Oral Daily    sodium chloride flush  10 mL IntraVENous 2 times per day    enoxaparin  30 mg SubCUTAneous BID    insulin glargine  40 Units SubCUTAneous BID    [Held by provider] cefepime  2,000 mg IntraVENous q8h    insulin lispro  0-16 Units SubCUTAneous TID

## 2023-03-23 NOTE — CARE COORDINATION
3/23, ZENON spoke with Dari from [facility] on patient whom they have accepted and are following. SW to follow.       Zahida Maguire [facility]  PHYSICIANS [facility] Case Management  [phone]

## 2023-03-23 NOTE — PLAN OF CARE
Problem: Chronic Conditions and Co-morbidities  Goal: Patient's chronic conditions and co-morbidity symptoms are monitored and maintained or improved  3/23/2023 0824 by Yonas Valencia RN  Outcome: Progressing  3/22/2023 1852 by Kenneth Hernandez RN  Outcome: Progressing     Problem: Safety - Adult  Goal: Free from fall injury  Outcome: Progressing

## 2023-03-23 NOTE — PROGRESS NOTES
.Pharmacy Consultation Note  (Antibiotic Dosing and Monitoring)    Re-consult date: 3/22/2023  Consulting physician/provider: Dr. Haley Mccarthy MD   Drug: Vancomycin  Indication: Bacteremia     Age/  Gender Height Weight IBW  Allergy Information   57 y.o./male 6' 3\" (190.5 cm) 240 lb (108.9 kg)     Ideal body weight: 84.5 kg (186 lb 4.6 oz)  Adjusted ideal body weight: 94.2 kg (207 lb 12.4 oz)   Patient has no known allergies. Renal Function:  Recent Labs     03/21/23  0838 03/22/23  0607 03/23/23  0554   BUN 45* 29* 20   CREATININE 1.5* 0.9 0.8       No intake or output data in the 24 hours ending 03/23/23 1145    Vancomycin Monitoring:  Trough:  No results for input(s): VANCOTROUGH in the last 72 hours. Random:    Recent Labs     03/22/23  0607   VANCORANDOM <4.0*         Recent Labs     03/21/23  1637   BLOODCULT2 Gram stain performed from blood culture bottle media  Gram positive cocci in clusters  *          Historical Cultures:  No results found for: [facility]  Recent Labs     03/21/23  1625   BC 24 Hours no growth       Vancomycin Administration Times:  Recent vancomycin administrations                     vancomycin 1500 mg in sodium chloride 0.9% 300 mL IVPB (mg) 1,500 mg New Bag 03/22/23 2321    vancomycin (VANCOCIN) 2,250 mg in sodium chloride 0.9 % 500 mL IVPB (mg) 2,250 mg New Bag 03/22/23 1145               Assessment:  Patient is a 56 y.o. male who has been initiated on vancomycin for bacteremia. Estimated Creatinine Clearance: 139 mL/min (based on SCr of 0.8 mg/dL). To dose vancomycin, pharmacy will be utilizing [employer] calculation software for goal AUC/MARIELA 400-600 mg/L-hr  3/22: Scr: 0.9 mg/dL. Leukocytosis = 15.3 (22.4 yesterday)  3/23: Scr: 0.8 mg/dL. Leukocytosis improved = 11.3 and afebrile. Staphylococcus aureus detected via PCR. Plan:  Continue vancomycin 1,500 mg IV Q12H [e AUC: 554 mg/L*hr; Trough: 17.5 mcg/mL].    Will order trough prior to 3/23 2330 dose (HOLD vancomycin dose if Trough level [>20 mCg/mL]. Will continue to monitor renal function. Pharmacy to follow.        Thank you for the consult,     Desmond Hong, PharmD, [address]  PGY1 Pharmacy Resident     3/23/2023 11:45 AM

## 2023-03-23 NOTE — CARE COORDINATION
3/23, Patient is on IV Vanc. Patient is on 2L. Nursing to work on weaning patient off 02 if possible. Patient does not wear 02 at home. Call placed to Dari from [facility] on [facility] since [facility] was referred to them last admission. Dari to look at referral.  ZENON to follow for further discharge planning needs.       MARYURI Jo  [facility] Case Management  [phone]

## 2023-03-23 NOTE — CONSULTS
0121       No Known Allergies    Surgical History  Past Surgical History:   Procedure Laterality Date    BACK SURGERY  2010    discectomy    LAMINECTOMY Right 8/26/2022    L4-S1 LAMINECTOMY, RIGHT L5-S1 DISCECTOMY performed by Pamela Louis MD at [address] [place] [address] History     Socioeconomic History    Marital status:    Tobacco Use    Smoking status: Never    Smokeless tobacco: Never   Vaping Use    Vaping Use: Never used   Substance and Sexual Activity    Alcohol use: Not Currently    Drug use: Never       Family Medical History  History reviewed. No pertinent family history. Review of Systems:  Constitutional: Had fever, no chills  Eyes: No vision changes, no retroorbital pain  ENT: No hearing changes, no ear pain  Respiratory: No cough, has dyspnea  Cardiovascular: No chest pain, no palpitations  Gastrointestinal: Has abdominal pain, no diarrhea  Genitourinary: No dysuria, no hematuria  Integumentary: No rash, no itching  Musculoskeletal: No muscle pain, no joint pain  Neurologic: No headache, no numbness in extremities    Physical Examination:  Vitals:    03/23/23 0645 03/23/23 0654 03/23/23 0740 03/23/23 0743   BP: (!) 144/75  (!) 142/72    Pulse: 92  (!) 102    Resp: 16 16 16    Temp: 98.5 °F (36.9 °C)  98 °F (36.7 °C)    TempSrc: Oral  Oral    SpO2:   (!) 89% 92%   Weight:       Height:         Constitutional: Alert, not in distress  Eyes: Sclerae anicteric, no conjunctival erythema  ENT: No buccal lesion, no pharyngeal exudates  Neck: No nuchal rigidity, no cervical adenopathy  Lungs: Clear breath sounds, no crackles, no wheezes  Heart: Regular rate and rhythm, no murmurs  Abdomen: Bowel sounds present, soft, nontender  Skin: Warm and dry, no active dermatoses  Musculoskeletal: No joint erythema, no joint swelling    Labs, imaging, and medical records/notes were personally reviewed.       Assessment:  Sepsis, resolved, secondary to Gram-positive cocci in clusters (MSSA by PCR) transient bacteremia, etiology unclear, suspect pneumonia    Plan:  Check CT chest.  Check urine Streptococcus pneumoniae and Legionella antigens. Follow up blood cultures. Stop azithromycin. Change vancomycin to cefazolin 2g q8h. Continue supportive care. Thank you for involving me in the care of Donna De Leon. I will continue to follow. Please do not hesitate to call for any questions or concerns.     Electronically signed by Quique Robles MD on 3/23/2023 at 12:08 PM

## 2023-03-24 PROBLEM — J86.9 EMPYEMA (HCC): Status: ACTIVE | Noted: 2023-03-21

## 2023-03-24 PROBLEM — R78.81 MSSA BACTEREMIA: Status: ACTIVE | Noted: 2023-03-24

## 2023-03-24 PROBLEM — B95.61 MSSA BACTEREMIA: Status: ACTIVE | Noted: 2023-03-24

## 2023-03-24 LAB
ANION GAP SERPL CALCULATED.3IONS-SCNC: 8 MMOL/L (ref 7–16)
BASOPHILS # BLD: 0.04 E9/L (ref 0–0.2)
BASOPHILS NFR BLD: 0.3 % (ref 0–2)
BUN SERPL-MCNC: 15 MG/DL (ref 6–20)
CALCIUM SERPL-MCNC: 8.3 MG/DL (ref 8.6–10.2)
CHLORIDE SERPL-SCNC: 101 MMOL/L (ref 98–107)
CO2 SERPL-SCNC: 28 MMOL/L (ref 22–29)
CREAT SERPL-MCNC: 0.8 MG/DL (ref 0.7–1.2)
EOSINOPHIL # BLD: 0 E9/L (ref 0.05–0.5)
EOSINOPHIL NFR BLD: 0 % (ref 0–6)
ERYTHROCYTE [DISTWIDTH] IN BLOOD BY AUTOMATED COUNT: 12.1 FL (ref 11.5–15)
GLUCOSE SERPL-MCNC: 220 MG/DL (ref 74–99)
HCT VFR BLD AUTO: 40.3 % (ref 37–54)
HGB BLD-MCNC: 13 G/DL (ref 12.5–16.5)
IMM GRANULOCYTES # BLD: 0.08 E9/L
IMM GRANULOCYTES NFR BLD: 0.7 % (ref 0–5)
LEGIONELLA AG UR QL: NORMAL
LYMPHOCYTES # BLD: 0.68 E9/L (ref 1.5–4)
LYMPHOCYTES NFR BLD: 5.8 % (ref 20–42)
MCH RBC QN AUTO: 28.6 PG (ref 26–35)
MCHC RBC AUTO-ENTMCNC: 32.3 % (ref 32–34.5)
MCV RBC AUTO: 88.6 FL (ref 80–99.9)
METER GLUCOSE: 111 MG/DL (ref 74–99)
METER GLUCOSE: 243 MG/DL (ref 74–99)
METER GLUCOSE: 249 MG/DL (ref 74–99)
METER GLUCOSE: 332 MG/DL (ref 74–99)
MONOCYTES # BLD: 0.73 E9/L (ref 0.1–0.95)
MONOCYTES NFR BLD: 6.3 % (ref 2–12)
NEUTROPHILS # BLD: 10.1 E9/L (ref 1.8–7.3)
NEUTS SEG NFR BLD: 86.9 % (ref 43–80)
ORGANISM: ABNORMAL
PLATELET # BLD AUTO: 216 E9/L (ref 130–450)
PMV BLD AUTO: 9.6 FL (ref 7–12)
POTASSIUM SERPL-SCNC: 4.1 MMOL/L (ref 3.5–5)
PROCALCITONIN: 0.88 NG/ML (ref 0–0.08)
RBC # BLD AUTO: 4.55 E12/L (ref 3.8–5.8)
S PNEUM AG SPEC QL: NORMAL
SODIUM SERPL-SCNC: 137 MMOL/L (ref 132–146)
WBC # BLD: 11.6 E9/L (ref 4.5–11.5)

## 2023-03-24 PROCEDURE — 6360000002 HC RX W HCPCS: Performed by: INTERNAL MEDICINE

## 2023-03-24 PROCEDURE — 82962 GLUCOSE BLOOD TEST: CPT

## 2023-03-24 PROCEDURE — 6370000000 HC RX 637 (ALT 250 FOR IP): Performed by: INTERNAL MEDICINE

## 2023-03-24 PROCEDURE — 99222 1ST HOSP IP/OBS MODERATE 55: CPT | Performed by: STUDENT IN AN ORGANIZED HEALTH CARE EDUCATION/TRAINING PROGRAM

## 2023-03-24 PROCEDURE — 6370000000 HC RX 637 (ALT 250 FOR IP): Performed by: HOSPITALIST

## 2023-03-24 PROCEDURE — 84145 PROCALCITONIN (PCT): CPT

## 2023-03-24 PROCEDURE — 36415 COLL VENOUS BLD VENIPUNCTURE: CPT

## 2023-03-24 PROCEDURE — 85025 COMPLETE CBC W/AUTO DIFF WBC: CPT

## 2023-03-24 PROCEDURE — S5553 INSULIN LONG ACTING 5 U: HCPCS | Performed by: HOSPITALIST

## 2023-03-24 PROCEDURE — 80048 BASIC METABOLIC PNL TOTAL CA: CPT

## 2023-03-24 PROCEDURE — 94640 AIRWAY INHALATION TREATMENT: CPT

## 2023-03-24 PROCEDURE — 2580000003 HC RX 258: Performed by: INTERNAL MEDICINE

## 2023-03-24 PROCEDURE — 2580000003 HC RX 258: Performed by: HOSPITALIST

## 2023-03-24 PROCEDURE — 1200000000 HC SEMI PRIVATE

## 2023-03-24 RX ORDER — KETOROLAC TROMETHAMINE 30 MG/ML
30 INJECTION, SOLUTION INTRAMUSCULAR; INTRAVENOUS EVERY 6 HOURS
Status: DISPENSED | OUTPATIENT
Start: 2023-03-24 | End: 2023-03-25

## 2023-03-24 RX ORDER — ENOXAPARIN SODIUM 100 MG/ML
30 INJECTION SUBCUTANEOUS DAILY
Status: DISPENSED | OUTPATIENT
Start: 2023-03-25 | End: 2023-03-27

## 2023-03-24 RX ADMIN — GUAIFENESIN 400 MG: 400 TABLET ORAL at 08:51

## 2023-03-24 RX ADMIN — OXYCODONE HYDROCHLORIDE 5 MG: 5 TABLET ORAL at 05:30

## 2023-03-24 RX ADMIN — BENZONATATE 100 MG: 100 CAPSULE ORAL at 14:11

## 2023-03-24 RX ADMIN — INSULIN LISPRO 12 UNITS: 100 INJECTION, SOLUTION INTRAVENOUS; SUBCUTANEOUS at 17:48

## 2023-03-24 RX ADMIN — IPRATROPIUM BROMIDE AND ALBUTEROL SULFATE 1 AMPULE: .5; 2.5 SOLUTION RESPIRATORY (INHALATION) at 13:48

## 2023-03-24 RX ADMIN — INSULIN LISPRO 15 UNITS: 100 INJECTION, SOLUTION INTRAVENOUS; SUBCUTANEOUS at 17:49

## 2023-03-24 RX ADMIN — ACETAMINOPHEN 1000 MG: 500 TABLET ORAL at 13:02

## 2023-03-24 RX ADMIN — IPRATROPIUM BROMIDE AND ALBUTEROL SULFATE 1 AMPULE: .5; 2.5 SOLUTION RESPIRATORY (INHALATION) at 20:04

## 2023-03-24 RX ADMIN — CEFAZOLIN 2000 MG: 2 INJECTION, POWDER, FOR SOLUTION INTRAMUSCULAR; INTRAVENOUS at 00:54

## 2023-03-24 RX ADMIN — SENNOSIDES AND DOCUSATE SODIUM 2 TABLET: 8.6; 5 TABLET ORAL at 17:16

## 2023-03-24 RX ADMIN — INSULIN LISPRO 4 UNITS: 100 INJECTION, SOLUTION INTRAVENOUS; SUBCUTANEOUS at 12:01

## 2023-03-24 RX ADMIN — OXYCODONE HYDROCHLORIDE 5 MG: 5 TABLET ORAL at 12:01

## 2023-03-24 RX ADMIN — INSULIN LISPRO 15 UNITS: 100 INJECTION, SOLUTION INTRAVENOUS; SUBCUTANEOUS at 08:55

## 2023-03-24 RX ADMIN — CEFAZOLIN 2000 MG: 2 INJECTION, POWDER, FOR SOLUTION INTRAMUSCULAR; INTRAVENOUS at 08:52

## 2023-03-24 RX ADMIN — INSULIN GLARGINE-YFGN 40 UNITS: 100 INJECTION, SOLUTION SUBCUTANEOUS at 08:55

## 2023-03-24 RX ADMIN — Medication 1 CAPSULE: at 08:50

## 2023-03-24 RX ADMIN — POLYETHYLENE GLYCOL 3350 17 G: 17 POWDER, FOR SOLUTION ORAL at 08:49

## 2023-03-24 RX ADMIN — GUAIFENESIN 400 MG: 400 TABLET ORAL at 14:11

## 2023-03-24 RX ADMIN — BENZONATATE 100 MG: 100 CAPSULE ORAL at 08:51

## 2023-03-24 RX ADMIN — OXYCODONE HYDROCHLORIDE 5 MG: 5 TABLET ORAL at 17:53

## 2023-03-24 RX ADMIN — KETOROLAC TROMETHAMINE 30 MG: 30 INJECTION, SOLUTION INTRAMUSCULAR; INTRAVENOUS at 14:11

## 2023-03-24 RX ADMIN — CEFAZOLIN 2000 MG: 2 INJECTION, POWDER, FOR SOLUTION INTRAMUSCULAR; INTRAVENOUS at 17:16

## 2023-03-24 RX ADMIN — ACETAMINOPHEN 1000 MG: 500 TABLET ORAL at 17:16

## 2023-03-24 RX ADMIN — Medication 5 MG: at 21:24

## 2023-03-24 RX ADMIN — KETOROLAC TROMETHAMINE 30 MG: 30 INJECTION, SOLUTION INTRAMUSCULAR; INTRAVENOUS at 00:01

## 2023-03-24 RX ADMIN — Medication 10 ML: at 08:57

## 2023-03-24 RX ADMIN — AMLODIPINE BESYLATE 5 MG: 5 TABLET ORAL at 08:50

## 2023-03-24 RX ADMIN — INSULIN LISPRO 15 UNITS: 100 INJECTION, SOLUTION INTRAVENOUS; SUBCUTANEOUS at 12:03

## 2023-03-24 RX ADMIN — PANTOPRAZOLE SODIUM 40 MG: 40 TABLET, DELAYED RELEASE ORAL at 06:50

## 2023-03-24 RX ADMIN — IPRATROPIUM BROMIDE AND ALBUTEROL SULFATE 1 AMPULE: .5; 2.5 SOLUTION RESPIRATORY (INHALATION) at 09:31

## 2023-03-24 RX ADMIN — INSULIN GLARGINE-YFGN 40 UNITS: 100 INJECTION, SOLUTION SUBCUTANEOUS at 21:22

## 2023-03-24 RX ADMIN — SERTRALINE 100 MG: 100 TABLET, FILM COATED ORAL at 08:49

## 2023-03-24 ASSESSMENT — PAIN DESCRIPTION - LOCATION
LOCATION: CHEST;BACK
LOCATION: CHEST
LOCATION: RIB CAGE
LOCATION: CHEST;BACK
LOCATION: BACK;CHEST

## 2023-03-24 ASSESSMENT — PAIN DESCRIPTION - DESCRIPTORS
DESCRIPTORS: SORE;SHARP;DISCOMFORT;ACHING
DESCRIPTORS: ACHING;SORE;DISCOMFORT
DESCRIPTORS: ACHING
DESCRIPTORS: ACHING;STABBING;THROBBING
DESCRIPTORS: ACHING;SHARP;SORE

## 2023-03-24 ASSESSMENT — PAIN DESCRIPTION - ORIENTATION
ORIENTATION: ANTERIOR
ORIENTATION: RIGHT
ORIENTATION: OUTER
ORIENTATION: RIGHT
ORIENTATION: RIGHT

## 2023-03-24 ASSESSMENT — PAIN SCALES - GENERAL
PAINLEVEL_OUTOF10: 7
PAINLEVEL_OUTOF10: 6
PAINLEVEL_OUTOF10: 4
PAINLEVEL_OUTOF10: 4
PAINLEVEL_OUTOF10: 5
PAINLEVEL_OUTOF10: 4
PAINLEVEL_OUTOF10: 6

## 2023-03-24 ASSESSMENT — PAIN DESCRIPTION - FREQUENCY
FREQUENCY: CONTINUOUS
FREQUENCY: CONTINUOUS

## 2023-03-24 ASSESSMENT — PAIN - FUNCTIONAL ASSESSMENT: PAIN_FUNCTIONAL_ASSESSMENT: PREVENTS OR INTERFERES SOME ACTIVE ACTIVITIES AND ADLS

## 2023-03-24 ASSESSMENT — PAIN DESCRIPTION - ONSET
ONSET: ON-GOING
ONSET: ON-GOING

## 2023-03-24 ASSESSMENT — PAIN DESCRIPTION - PAIN TYPE: TYPE: ACUTE PAIN

## 2023-03-24 NOTE — PROGRESS NOTES
dextrose    I/O    Intake/Output Summary (Last 24 hours) at 3/24/2023 1405  Last data filed at 3/24/2023 1119  Gross per 24 hour   Intake 360 ml   Output --   Net 360 ml       Labs:   Recent Labs     03/22/23  0607 03/23/23  0554 03/24/23  0802   WBC 15.3* 11.5 11.6*   HGB 14.8 13.7 13.0   HCT 44.6 40.4 40.3    182 216       Recent Labs     03/22/23  0607 03/23/23  0554 03/24/23  0802    138 137   K 4.1  4.1 3.8 4.1   CL 96* 102 101   CO2 27 28 28   BUN 29* 20 15   CREATININE 0.9 0.8 0.8   CALCIUM 9.0 8.1* 8.3*       Recent Labs     03/22/23  0607 03/23/23  0554   PROT 6.3* 5.7*   ALKPHOS 101 104   ALT 14 32   AST 7 28   BILITOT 0.3 0.3       No results for input(s): INR in the last 72 hours. No results for input(s): Miri Byrne in the last 72 hours. Chronic labs:  Lab Results   Component Value Date    CHOL 136 11/13/2017    TRIG 281 (H) 11/13/2017    HDL 26 (L) 11/13/2017    TSH 0.893 03/23/2023    INR 1.0 08/19/2022    LABA1C 10.5 (H) 03/22/2023       Radiology:  Imaging studies reviewed today. Subjective:     [place] still c/o R sided pain when he takes in a deep breath    Objective:     Physical Exam:   /80   Pulse 96   Temp 98.2 °F (36.8 °C) (Oral)   Resp 16   Ht 6' 3\" (1.905 m)   Wt 240 lb (108.9 kg)   SpO2 93%   BMI 30.00 kg/m²     General appearance:in no distress, flat in bed  Lungs: diminished in bases bilaterally, no wheezing or crackles   Heart: Regular rate and rhythm, S1, S2 normal   Abdomen: Soft, non-tender and not-distended.  Bowel sounds normal.   Extremities: no edema   Neurologic: Grossly normal and non focal, CN II-XII intact       Latia Blankenship MD   Hospitalist Service   03/24/23 2:05 PM

## 2023-03-24 NOTE — CONSULTS
Abdomen is soft. Musculoskeletal:         General: No swelling. Normal range of motion. Cervical back: Normal range of motion. Skin:     General: Skin is warm and dry. Capillary Refill: Capillary refill takes less than 2 seconds. Neurological:      General: No focal deficit present. Mental Status: He is alert and oriented to person, place, and time. Mental status is at baseline. Psychiatric:         Mood and Affect: Mood normal.         Behavior: Behavior normal.         Thought Content: Thought content normal.         Judgment: Judgment normal.          CT chest  EXAMINATION:   CT OF THE CHEST WITH CONTRAST 3/23/2023 7:11 pm       TECHNIQUE:   CT of the chest was performed with the administration of intravenous   contrast. Multiplanar reformatted images are provided for review. Automated   exposure control, iterative reconstruction, and/or weight based adjustment of   the mA/kV was utilized to reduce the radiation dose to as low as reasonably   achievable. COMPARISON:   CT abdomen and pelvis dated 03/21/2023       HISTORY:   ORDERING SYSTEM PROVIDED HISTORY: Staphylococcus aureus bacteremia, check for   empyema   TECHNOLOGIST PROVIDED HISTORY:   Reason for exam:->Staphylococcus aureus bacteremia, check for empyema   What reading provider will be dictating this exam?->CRC       FINDINGS:   Mediastinum: Thyroid is homogeneous in attenuation. No bulky mediastinal   adenopathy. Central airways are patent. Esophagus is normal course and   caliber. Cardiac size within normal limits without pericardial effusion. Lungs/pleura: Loculated moderate right pleural effusion with atelectatic   changes of a largely atelectatic right middle and right lower lobe.    Scattered gas densities in the right lower lobe pleural portion for example   axial series 301, image [id number] could represent components of gas within the   pleural space correlate with recent instrumentation or could represent components of developing necrosis. Left hemithorax grossly clear apart from   a trace left pleural effusion and minimal atelectasis. Upper Abdomen: Visualized portions of the upper abdomen unremarkable. Soft Tissues/Bones: No acute osseous or soft tissue findings. No aggressive   osseous lesion. Impression   Loculated moderate right pleural effusion with atelectatic changes of a   largely atelectatic right middle and right lower lobe. Scattered gas   densities in the right lower lobe pleural portion for example axial series   301, image [id number] could represent components of gas within the pleural space   correlate with recent instrumentation or could represent components of   developing necrosis this was identified on recent CT abdomen and pelvis   examination but progressed from that exam further concerning for infectious   or necrotic components. Assessment: 53yo M with PMH DJD, HTN, depression, DM, HLD presented to ED 3/21/2023 with CC of back pain that extended into his right chest. During his work-up, he was found to have a right pleural effusion, concerning for empyema. CTS was consulted for recommendations. Plan: Given his findings, would likely benefit from R VATS, drainage of pleural effusion/empyema, possible decortication on Monday  Continue antibiotics, appreciate ID recommendations    All risks, benefits, alternatives and potential complications explained thoroughly including, but not limited to, bleeding, infection, lung injury, kidney injury, stroke, heart attack, prolonged ventilation, wound complication, need for re-operation, and death, and the patient agrees to proceed.       Electronically signed by Manasa Cohen DO on 3/24/2023 at 11:59 AM

## 2023-03-24 NOTE — PROGRESS NOTES
Called Dr Victor Manuel Noble office for Pulmonary consult. Spoke to Shivam Cameron. Awaiting call back.

## 2023-03-24 NOTE — PROGRESS NOTES
Pharmacy Consultation Note  (Antibiotic Dosing and Monitoring)    Re-consult date: 3/22/2023  Consulting physician/provider: Dr. Demarcus Smith MD   Drug: Vancomycin  Indication: Bacteremia     Vancomycin has been discontinued. Clinical Pharmacy to sign-off. Physician to re-consult pharmacy if future dosing is needed.     Thank you for the consult,    Peggy Salazar, PharmD  PGY1 Pharmacy Resident 3/24/2023 8:05 AM

## 2023-03-24 NOTE — PROGRESS NOTES
[facility] PROGRESS NOTE      Chief complaint: Follow-up of Staphylococcus aureus bacteremia    The patient is a 56 y.o. male with history of DM, L4-S1 laminectomy in 08/2022, presented on 03/21 with back pain for a week after he was using a sledge hammer at work. On admission, he was afebrile and hemodynamically stable with leukocytosis of 22,000. Chest x-ray showed new right pleural effusion with adjacent atelectasis and/or infiltrate. CT abdomen and pelvis showed small right pleural effusion with infiltrate in the right lung base, no acute abdominal findings. MRI thoracolumbar spine with and without contrast showed large bulky osteophytes along the right aspect of T6/T7, T7/T8, T8/T9, T9/T10 with edema and enhancement suggestive of recent trauma or repetitive injury, mild central canal stenosis at L4/L5 with moderate bilateral neural foraminal narrowing. Blood cultures showed MSSA in 1 out of 2 sets. He received a dose of ceftriaxone on admission. Azithromycin and vancomycin were started on admission then switched to cefazolin on 03/23. CT chest showed loculated moderate right pleural effusion with atelectatic changes of largely atelectatic right middle and lower lobe with scattered gas densities in the right lower lobe pleural portion. Transthoracic echocardiogram showed no evidence of endocarditis. Subjective: Patient was seen and examined. No chills, no abdominal pain, no diarrhea, no rash, no itching, has right subcostal pain.       Objective:  /80   Pulse 96   Temp 98.2 °F (36.8 °C) (Oral)   Resp 16   Ht 6' 3\" (1.905 m)   Wt 240 lb (108.9 kg)   SpO2 93%   BMI 30.00 kg/m²   Constitutional: Alert, not in distress  Respiratory: Clear breath sounds, no crackles, no wheezes  Cardiovascular: Regular rate and rhythm, no murmurs  Gastrointestinal: Bowel sounds present, soft, nontender  Skin: Warm and dry, no active dermatoses  Musculoskeletal: No joint swelling, no joint erythema    Labs, imaging, and medical records/notes were personally reviewed. Assessment:  Sepsis, resolved, secondary to MSSA transient bacteremia, etiology unclear, suspect associated with loculated pneumonia +/-empyema. Transthoracic echocardiogram showed no evidence of endocarditis. Recommendations:  Continue cefazolin 2 g every 8 hours. Follow-up blood cultures. Consider Cardiothoracic Surgery evaluation for possible empyema. Continue supportive care. Case was discussed with Dr. Josué Foreman. Thank you for involving me in the care of Donna De Leon. I will continue to follow. Please do not hesitate to call for any questions or concerns.       Electronically signed by Quique Robles MD on 3/24/2023 at 10:24 AM

## 2023-03-24 NOTE — CARE COORDINATION
3/24, SW met with patient and wife in room. 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  Patient gave this worker the following information:  Maryam Garcia ph# [phone] QXU#[phone]. The worker's comp information was given to Garfield Lowe at [employer] (faxed to [phone]) and Dari from [facility] should patient need to go home with [facility] IV antibiotics. Call was placed to [facility] and spoke with Brittny Barrientos since [facility] was not available to speak with on patient. Discussed with Jason patient possibility needing HHC and IV antibiotics. Per Brittny Barrientos PA does not do C9 forms and would just need to fax over what is needed for patient. Discharge plan at this time remains home with wife. SW to follow.       MARYURI Miller  [facility] Case Management  [phone]

## 2023-03-25 LAB
ANION GAP SERPL CALCULATED.3IONS-SCNC: 9 MMOL/L (ref 7–16)
BASOPHILS # BLD: 0.03 E9/L (ref 0–0.2)
BASOPHILS NFR BLD: 0.3 % (ref 0–2)
BUN SERPL-MCNC: 14 MG/DL (ref 6–20)
CALCIUM SERPL-MCNC: 8.4 MG/DL (ref 8.6–10.2)
CHLORIDE SERPL-SCNC: 100 MMOL/L (ref 98–107)
CO2 SERPL-SCNC: 27 MMOL/L (ref 22–29)
CREAT SERPL-MCNC: 0.7 MG/DL (ref 0.7–1.2)
EOSINOPHIL # BLD: 0 E9/L (ref 0.05–0.5)
EOSINOPHIL NFR BLD: 0 % (ref 0–6)
ERYTHROCYTE [DISTWIDTH] IN BLOOD BY AUTOMATED COUNT: 12 FL (ref 11.5–15)
GLUCOSE SERPL-MCNC: 89 MG/DL (ref 74–99)
HCT VFR BLD AUTO: 39.7 % (ref 37–54)
HGB BLD-MCNC: 12.6 G/DL (ref 12.5–16.5)
IMM GRANULOCYTES # BLD: 0.04 E9/L
IMM GRANULOCYTES NFR BLD: 0.4 % (ref 0–5)
LYMPHOCYTES # BLD: 0.97 E9/L (ref 1.5–4)
LYMPHOCYTES NFR BLD: 9.8 % (ref 20–42)
MCH RBC QN AUTO: 28.3 PG (ref 26–35)
MCHC RBC AUTO-ENTMCNC: 31.7 % (ref 32–34.5)
MCV RBC AUTO: 89.2 FL (ref 80–99.9)
METER GLUCOSE: 104 MG/DL (ref 74–99)
METER GLUCOSE: 163 MG/DL (ref 74–99)
METER GLUCOSE: 176 MG/DL (ref 74–99)
METER GLUCOSE: 205 MG/DL (ref 74–99)
METER GLUCOSE: 208 MG/DL (ref 74–99)
MONOCYTES # BLD: 0.71 E9/L (ref 0.1–0.95)
MONOCYTES NFR BLD: 7.2 % (ref 2–12)
NEUTROPHILS # BLD: 8.14 E9/L (ref 1.8–7.3)
NEUTS SEG NFR BLD: 82.3 % (ref 43–80)
PLATELET # BLD AUTO: 205 E9/L (ref 130–450)
PMV BLD AUTO: 9.8 FL (ref 7–12)
POTASSIUM SERPL-SCNC: 3.7 MMOL/L (ref 3.5–5)
PROCALCITONIN: 0.63 NG/ML (ref 0–0.08)
RBC # BLD AUTO: 4.45 E12/L (ref 3.8–5.8)
SODIUM SERPL-SCNC: 136 MMOL/L (ref 132–146)
WBC # BLD: 9.9 E9/L (ref 4.5–11.5)

## 2023-03-25 PROCEDURE — 82962 GLUCOSE BLOOD TEST: CPT

## 2023-03-25 PROCEDURE — 6370000000 HC RX 637 (ALT 250 FOR IP): Performed by: INTERNAL MEDICINE

## 2023-03-25 PROCEDURE — 2580000003 HC RX 258: Performed by: HOSPITALIST

## 2023-03-25 PROCEDURE — 99232 SBSQ HOSP IP/OBS MODERATE 35: CPT | Performed by: STUDENT IN AN ORGANIZED HEALTH CARE EDUCATION/TRAINING PROGRAM

## 2023-03-25 PROCEDURE — 80048 BASIC METABOLIC PNL TOTAL CA: CPT

## 2023-03-25 PROCEDURE — 36415 COLL VENOUS BLD VENIPUNCTURE: CPT

## 2023-03-25 PROCEDURE — 84145 PROCALCITONIN (PCT): CPT

## 2023-03-25 PROCEDURE — S5553 INSULIN LONG ACTING 5 U: HCPCS | Performed by: HOSPITALIST

## 2023-03-25 PROCEDURE — 6360000002 HC RX W HCPCS: Performed by: INTERNAL MEDICINE

## 2023-03-25 PROCEDURE — 6370000000 HC RX 637 (ALT 250 FOR IP): Performed by: HOSPITALIST

## 2023-03-25 PROCEDURE — 1200000000 HC SEMI PRIVATE

## 2023-03-25 PROCEDURE — 2700000000 HC OXYGEN THERAPY PER DAY

## 2023-03-25 PROCEDURE — 2580000003 HC RX 258: Performed by: INTERNAL MEDICINE

## 2023-03-25 PROCEDURE — 94640 AIRWAY INHALATION TREATMENT: CPT

## 2023-03-25 PROCEDURE — 85025 COMPLETE CBC W/AUTO DIFF WBC: CPT

## 2023-03-25 RX ADMIN — INSULIN GLARGINE-YFGN 40 UNITS: 100 INJECTION, SOLUTION SUBCUTANEOUS at 20:41

## 2023-03-25 RX ADMIN — CEFAZOLIN 2000 MG: 2 INJECTION, POWDER, FOR SOLUTION INTRAMUSCULAR; INTRAVENOUS at 08:44

## 2023-03-25 RX ADMIN — INSULIN GLARGINE-YFGN 40 UNITS: 100 INJECTION, SOLUTION SUBCUTANEOUS at 08:45

## 2023-03-25 RX ADMIN — CEFAZOLIN 2000 MG: 2 INJECTION, POWDER, FOR SOLUTION INTRAMUSCULAR; INTRAVENOUS at 00:05

## 2023-03-25 RX ADMIN — INSULIN LISPRO 4 UNITS: 100 INJECTION, SOLUTION INTRAVENOUS; SUBCUTANEOUS at 12:40

## 2023-03-25 RX ADMIN — ACETAMINOPHEN 1000 MG: 500 TABLET ORAL at 08:44

## 2023-03-25 RX ADMIN — INSULIN LISPRO 15 UNITS: 100 INJECTION, SOLUTION INTRAVENOUS; SUBCUTANEOUS at 12:36

## 2023-03-25 RX ADMIN — OXYCODONE HYDROCHLORIDE 5 MG: 5 TABLET ORAL at 09:00

## 2023-03-25 RX ADMIN — BENZONATATE 100 MG: 100 CAPSULE ORAL at 08:45

## 2023-03-25 RX ADMIN — INSULIN LISPRO 15 UNITS: 100 INJECTION, SOLUTION INTRAVENOUS; SUBCUTANEOUS at 17:55

## 2023-03-25 RX ADMIN — GUAIFENESIN 400 MG: 400 TABLET ORAL at 20:42

## 2023-03-25 RX ADMIN — BENZONATATE 100 MG: 100 CAPSULE ORAL at 20:42

## 2023-03-25 RX ADMIN — Medication 1 CAPSULE: at 08:45

## 2023-03-25 RX ADMIN — ACETAMINOPHEN 1000 MG: 500 TABLET ORAL at 20:43

## 2023-03-25 RX ADMIN — OXYCODONE HYDROCHLORIDE 5 MG: 5 TABLET ORAL at 16:19

## 2023-03-25 RX ADMIN — AMLODIPINE BESYLATE 5 MG: 5 TABLET ORAL at 08:45

## 2023-03-25 RX ADMIN — Medication 2000 UNITS: at 08:44

## 2023-03-25 RX ADMIN — Medication 10 ML: at 08:45

## 2023-03-25 RX ADMIN — IPRATROPIUM BROMIDE AND ALBUTEROL SULFATE 1 AMPULE: .5; 2.5 SOLUTION RESPIRATORY (INHALATION) at 08:29

## 2023-03-25 RX ADMIN — ROSUVASTATIN CALCIUM 20 MG: 20 TABLET, FILM COATED ORAL at 20:42

## 2023-03-25 RX ADMIN — Medication 5 MG: at 20:42

## 2023-03-25 RX ADMIN — CEFAZOLIN 2000 MG: 2 INJECTION, POWDER, FOR SOLUTION INTRAMUSCULAR; INTRAVENOUS at 16:17

## 2023-03-25 RX ADMIN — IPRATROPIUM BROMIDE AND ALBUTEROL SULFATE 1 AMPULE: .5; 2.5 SOLUTION RESPIRATORY (INHALATION) at 18:20

## 2023-03-25 RX ADMIN — GUAIFENESIN 400 MG: 400 TABLET ORAL at 09:01

## 2023-03-25 RX ADMIN — OXYCODONE HYDROCHLORIDE 5 MG: 5 TABLET ORAL at 20:41

## 2023-03-25 RX ADMIN — INSULIN LISPRO 15 UNITS: 100 INJECTION, SOLUTION INTRAVENOUS; SUBCUTANEOUS at 08:50

## 2023-03-25 RX ADMIN — SERTRALINE 100 MG: 100 TABLET, FILM COATED ORAL at 08:45

## 2023-03-25 ASSESSMENT — PAIN DESCRIPTION - DESCRIPTORS
DESCRIPTORS: ACHING
DESCRIPTORS: ACHING;DISCOMFORT;SHARP
DESCRIPTORS: SHARP;ACHING;DISCOMFORT
DESCRIPTORS: SHARP;DISCOMFORT;SORE

## 2023-03-25 ASSESSMENT — PAIN DESCRIPTION - LOCATION
LOCATION: RIB CAGE
LOCATION: OTHER (COMMENT)
LOCATION: RIB CAGE;BACK
LOCATION: HEAD

## 2023-03-25 ASSESSMENT — PAIN - FUNCTIONAL ASSESSMENT: PAIN_FUNCTIONAL_ASSESSMENT: PREVENTS OR INTERFERES SOME ACTIVE ACTIVITIES AND ADLS

## 2023-03-25 ASSESSMENT — PAIN SCALES - GENERAL
PAINLEVEL_OUTOF10: 6
PAINLEVEL_OUTOF10: 3
PAINLEVEL_OUTOF10: 6
PAINLEVEL_OUTOF10: 5
PAINLEVEL_OUTOF10: 0

## 2023-03-25 ASSESSMENT — PAIN DESCRIPTION - ORIENTATION
ORIENTATION: RIGHT

## 2023-03-25 ASSESSMENT — PAIN DESCRIPTION - FREQUENCY: FREQUENCY: INTERMITTENT

## 2023-03-25 ASSESSMENT — PAIN DESCRIPTION - PAIN TYPE: TYPE: ACUTE PAIN

## 2023-03-25 NOTE — PROGRESS NOTES
Lovenox        Electronically signed by CHINTAN Conroy CNP on 3/25/2023 at 2:24 PM    Seen and evaluated, agree with above assessment and plan  For VATS Monday

## 2023-03-25 NOTE — PROGRESS NOTES
CC: back pain and right chest pain    Brief HPI:  Patient seen with Dr. Muna Armando. Awake, alert. No complaints. Up in chair. Wife at bedside. Past Medical History:   Diagnosis Date    Diabetes mellitus [facility])      Past Surgical History:   Procedure Laterality Date    BACK SURGERY  2010    discectomy    LAMINECTOMY Right 8/26/2022    L4-S1 LAMINECTOMY, RIGHT L5-S1 DISCECTOMY performed by Agsutin Arnold MD at [place] [address] History     Socioeconomic History    Marital status:      Spouse name: Not on file    Number of children: Not on file    Years of education: Not on file    Highest education level: Not on file   Occupational History    Not on file   Tobacco Use    Smoking status: Never    Smokeless tobacco: Never   Vaping Use    Vaping Use: Never used   Substance and Sexual Activity    Alcohol use: Not Currently    Drug use: Never    Sexual activity: Not on file   Other Topics Concern    Not on file   Social History Narrative    Not on file     Social Determinants of Health     Financial Resource Strain: Not on file   Food Insecurity: Not on file   Transportation Needs: Not on file   Physical Activity: Not on file   Stress: Not on file   Social Connections: Not on file   Intimate Partner Violence: Not on file   Housing Stability: Not on file     History reviewed. No pertinent family history.     Vitals:    03/24/23 1949 03/24/23 2004 03/25/23 0746 03/25/23 0830   BP: (!) 151/81  (!) 164/85    Pulse: 81 88 94 92   Resp: 20 17 18 28   Temp: 98.7 °F (37.1 °C)  99 °F (37.2 °C)    TempSrc: Oral  Temporal    SpO2: 92% 93% 93%    Weight:       Height:               Intake/Output Summary (Last 24 hours) at 3/25/2023 1113  Last data filed at 3/25/2023 0910  Gross per 24 hour   Intake 1080 ml   Output --   Net 1080 ml         Recent Labs     03/23/23  0554 03/24/23  0802 03/25/23  0614   WBC 11.5 11.6* 9.9   HGB 13.7 13.0 12.6   HCT 40.4 40.3 39.7    216 205      Recent Labs     03/23/23  0554 03/24/23  0802 decortication  All risks, benefits, alternatives and potential complications explained thoroughly including, but not limited to, bleeding, infection, lung injury, kidney injury, stroke, heart attack, prolonged ventilation, wound complication, need for re-operation, and death, and the patient agrees to proceed.     [address]  Cardiothoracic Surgery

## 2023-03-25 NOTE — PROGRESS NOTES
glycol  17 g Oral BID    melatonin  5 mg Oral Nightly    pantoprazole  40 mg Oral QAM AC    rosuvastatin  20 mg Oral Nightly    sertraline  100 mg Oral Daily    sodium chloride flush  10 mL IntraVENous 2 times per day    insulin glargine  40 Units SubCUTAneous BID    insulin lispro  0-16 Units SubCUTAneous TID WC    insulin lispro  15 Units SubCUTAneous TID WC    insulin lispro  0-4 Units SubCUTAneous Nightly     PRN Meds: sennosides-docusate sodium, tiZANidine, cloNIDine, melatonin, perflutren lipid microspheres, sodium chloride flush, sodium chloride, promethazine **OR** ondansetron, perflutren lipid microspheres, oxyCODONE, glucose, dextrose bolus **OR** dextrose bolus, glucagon (rDNA), dextrose    I/O    Intake/Output Summary (Last 24 hours) at 3/25/2023 1229  Last data filed at 3/25/2023 0910  Gross per 24 hour   Intake 720 ml   Output --   Net 720 ml       Labs:   Recent Labs     03/23/23  0554 03/24/23  0802 03/25/23  0614   WBC 11.5 11.6* 9.9   HGB 13.7 13.0 12.6   HCT 40.4 40.3 39.7    216 205       Recent Labs     03/23/23  0554 03/24/23  0802 03/25/23  0614    137 136   K 3.8 4.1 3.7    101 100   CO2 28 28 27   BUN 20 15 14   CREATININE 0.8 0.8 0.7   CALCIUM 8.1* 8.3* 8.4*       Recent Labs     03/23/23  0554   PROT 5.7*   ALKPHOS 104   ALT 32   AST 28   BILITOT 0.3       No results for input(s): INR in the last 72 hours. No results for input(s): Daina Lr in the last 72 hours. Chronic labs:  Lab Results   Component Value Date    CHOL 136 11/13/2017    TRIG 281 (H) 11/13/2017    HDL 26 (L) 11/13/2017    TSH 0.893 03/23/2023    INR 1.0 08/19/2022    LABA1C 10.5 (H) 03/22/2023       Microbiology:  Pending  No results for input(s): BC in the last 72 hours. No results for input(s): ORG in the last 72 hours. No results for input(s): Meka Ayala in the last 72 hours.   Recent Labs     03/23/23  2340   STREPNEUMAGU Presumptive negative- suggests no current or Bacteremia    Empyema (HCC)    MSSA bacteremia  Resolved Problems:    * No resolved hospital problems. *       PLAN:      1. Awaiting chest surgery on Monday  2. physically he is a lot stronger  3. Maintaining blood sugar control  4. Infectious disease is controlling antibiotics for his methicillin staph sensitive Staph aureus bacteremia  5. Good control of his hypertension  6. Maintaining antihyperlipidemia protocol      Diet: ADULT DIET; Regular; 3 carb choices (45 gm/meal); Low Sodium (2 gm)  Diet NPO  Code Status: Full Code  PT/OT Eval Status:   Independent for bathroom needs  DVT Prophylaxis:   In place  Recommended disposition at discharge: Likely home not right away. Discussed with wife it may take quite a few days yet    +++++++++++++++++++++++++++++++++++++++++++++++++  Willard Mitchell MD   [facility].  +++++++++++++++++++++++++++++++++++++++++++++++++  NOTE: This report was transcribed using voice recognition software. Every effort was made to ensure accuracy; however, inadvertent computerized transcription errors may be present.

## 2023-03-25 NOTE — PROGRESS NOTES
[facility] PROGRESS NOTE      Chief complaint: Follow-up of Staphylococcus aureus bacteremia    The patient is a 56 y.o. male with history of DM, L4-S1 laminectomy in 08/2022, presented on 03/21 with back pain for a week after he was using a sledge hammer at work. On admission, he was afebrile and hemodynamically stable with leukocytosis of 22,000. Chest x-ray showed new right pleural effusion with adjacent atelectasis and/or infiltrate. CT abdomen and pelvis showed small right pleural effusion with infiltrate in the right lung base, no acute abdominal findings. MRI thoracolumbar spine with and without contrast showed large bulky osteophytes along the right aspect of T6/T7, T7/T8, T8/T9, T9/T10 with edema and enhancement suggestive of recent trauma or repetitive injury, mild central canal stenosis at L4/L5 with moderate bilateral neural foraminal narrowing. Blood cultures showed MSSA in 1 out of 2 sets. He received a dose of ceftriaxone on admission. Azithromycin and vancomycin were started on admission then switched to cefazolin on 03/23. CT chest showed loculated moderate right pleural effusion with atelectatic changes of largely atelectatic right middle and lower lobe with scattered gas densities in the right lower lobe pleural portion. Transthoracic echocardiogram showed no evidence of endocarditis. Subjective: Patient was seen and examined. No chills, no abdominal pain, no diarrhea, no rash, no itching, has unchanged right subcostal and middle back pain.       Objective:  BP (!) 164/85   Pulse 92   Temp 99 °F (37.2 °C) (Temporal)   Resp 28   Ht 6' 3\" (1.905 m)   Wt 240 lb (108.9 kg)   SpO2 93%   BMI 30.00 kg/m²   Constitutional: Alert, not in distress  Respiratory: Clear breath sounds, no crackles, no wheezes  Cardiovascular: Regular rate and rhythm, no murmurs  Gastrointestinal: Bowel sounds present, soft, nontender  Skin: Warm and dry, no active dermatoses  Musculoskeletal: No joint swelling, no joint erythema    Labs, imaging, and medical records/notes were personally reviewed. Assessment:  Sepsis, resolved, secondary to MSSA transient bacteremia, etiology unclear, suspect associated with loculated pneumonia +/-empyema. Transthoracic echocardiogram showed no evidence of endocarditis. Recommendations:  Continue cefazolin 2 g every 8 hours. Follow-up blood cultures. Plan for right VATS with drainage of pleural effusion/empyema and possible decortication per Cardiothoracic Surgery is noted. Continue supportive care. Thank you for involving me in the care of Shilpi Israel. I will continue to follow. Please do not hesitate to call for any questions or concerns.       Electronically signed by Odette Youssef MD on 3/25/2023 at 10:45 AM

## 2023-03-26 LAB
ABO + RH BLD: NORMAL
ALBUMIN SERPL-MCNC: 2.7 G/DL (ref 3.5–5.2)
ALP SERPL-CCNC: 226 U/L (ref 40–129)
ALT SERPL-CCNC: 91 U/L (ref 0–40)
ANION GAP SERPL CALCULATED.3IONS-SCNC: 10 MMOL/L (ref 7–16)
APTT BLD: 29 SEC (ref 24.5–35.1)
AST SERPL-CCNC: 62 U/L (ref 0–39)
BILIRUB SERPL-MCNC: 0.3 MG/DL (ref 0–1.2)
BLD GP AB SCN SERPL QL: NORMAL
BUN SERPL-MCNC: 10 MG/DL (ref 6–20)
CALCIUM SERPL-MCNC: 8.9 MG/DL (ref 8.6–10.2)
CHLORIDE SERPL-SCNC: 102 MMOL/L (ref 98–107)
CO2 SERPL-SCNC: 29 MMOL/L (ref 22–29)
CREAT SERPL-MCNC: 0.9 MG/DL (ref 0.7–1.2)
ERYTHROCYTE [DISTWIDTH] IN BLOOD BY AUTOMATED COUNT: 11.9 FL (ref 11.5–15)
GLUCOSE SERPL-MCNC: 167 MG/DL (ref 74–99)
HCT VFR BLD AUTO: 40.4 % (ref 37–54)
HGB BLD-MCNC: 13.2 G/DL (ref 12.5–16.5)
INR BLD: 1.3
MCH RBC QN AUTO: 28.8 PG (ref 26–35)
MCHC RBC AUTO-ENTMCNC: 32.7 % (ref 32–34.5)
MCV RBC AUTO: 88 FL (ref 80–99.9)
METER GLUCOSE: 141 MG/DL (ref 74–99)
METER GLUCOSE: 148 MG/DL (ref 74–99)
METER GLUCOSE: 202 MG/DL (ref 74–99)
METER GLUCOSE: 221 MG/DL (ref 74–99)
METER GLUCOSE: 229 MG/DL (ref 74–99)
PLATELET # BLD AUTO: 233 E9/L (ref 130–450)
PMV BLD AUTO: 9.2 FL (ref 7–12)
POTASSIUM SERPL-SCNC: 3.6 MMOL/L (ref 3.5–5)
PROT SERPL-MCNC: 6.2 G/DL (ref 6.4–8.3)
PROTHROMBIN TIME: 14 SEC (ref 9.3–12.4)
RBC # BLD AUTO: 4.59 E12/L (ref 3.8–5.8)
SODIUM SERPL-SCNC: 141 MMOL/L (ref 132–146)
WBC # BLD: 11.1 E9/L (ref 4.5–11.5)

## 2023-03-26 PROCEDURE — S5553 INSULIN LONG ACTING 5 U: HCPCS | Performed by: EMERGENCY MEDICINE

## 2023-03-26 PROCEDURE — 6370000000 HC RX 637 (ALT 250 FOR IP): Performed by: INTERNAL MEDICINE

## 2023-03-26 PROCEDURE — 99232 SBSQ HOSP IP/OBS MODERATE 35: CPT | Performed by: STUDENT IN AN ORGANIZED HEALTH CARE EDUCATION/TRAINING PROGRAM

## 2023-03-26 PROCEDURE — 82962 GLUCOSE BLOOD TEST: CPT

## 2023-03-26 PROCEDURE — 80053 COMPREHEN METABOLIC PANEL: CPT

## 2023-03-26 PROCEDURE — 86900 BLOOD TYPING SEROLOGIC ABO: CPT

## 2023-03-26 PROCEDURE — 6370000000 HC RX 637 (ALT 250 FOR IP): Performed by: HOSPITALIST

## 2023-03-26 PROCEDURE — 85027 COMPLETE CBC AUTOMATED: CPT

## 2023-03-26 PROCEDURE — 2580000003 HC RX 258: Performed by: HOSPITALIST

## 2023-03-26 PROCEDURE — 94640 AIRWAY INHALATION TREATMENT: CPT

## 2023-03-26 PROCEDURE — 86901 BLOOD TYPING SEROLOGIC RH(D): CPT

## 2023-03-26 PROCEDURE — 1200000000 HC SEMI PRIVATE

## 2023-03-26 PROCEDURE — 85730 THROMBOPLASTIN TIME PARTIAL: CPT

## 2023-03-26 PROCEDURE — 36415 COLL VENOUS BLD VENIPUNCTURE: CPT

## 2023-03-26 PROCEDURE — 85610 PROTHROMBIN TIME: CPT

## 2023-03-26 PROCEDURE — 2580000003 HC RX 258: Performed by: INTERNAL MEDICINE

## 2023-03-26 PROCEDURE — 6360000002 HC RX W HCPCS: Performed by: HOSPITALIST

## 2023-03-26 PROCEDURE — 86923 COMPATIBILITY TEST ELECTRIC: CPT

## 2023-03-26 PROCEDURE — S5553 INSULIN LONG ACTING 5 U: HCPCS | Performed by: HOSPITALIST

## 2023-03-26 PROCEDURE — 6370000000 HC RX 637 (ALT 250 FOR IP): Performed by: EMERGENCY MEDICINE

## 2023-03-26 PROCEDURE — 6360000002 HC RX W HCPCS: Performed by: INTERNAL MEDICINE

## 2023-03-26 PROCEDURE — 86850 RBC ANTIBODY SCREEN: CPT

## 2023-03-26 RX ORDER — CHLORHEXIDINE GLUCONATE 0.12 MG/ML
15 RINSE ORAL ONCE
Status: COMPLETED | OUTPATIENT
Start: 2023-03-27 | End: 2023-03-27

## 2023-03-26 RX ORDER — INSULIN GLARGINE-YFGN 100 [IU]/ML
45 INJECTION, SOLUTION SUBCUTANEOUS 2 TIMES DAILY
Status: DISCONTINUED | OUTPATIENT
Start: 2023-03-26 | End: 2023-03-28

## 2023-03-26 RX ORDER — CHLORHEXIDINE GLUCONATE 4 G/100ML
SOLUTION TOPICAL SEE ADMIN INSTRUCTIONS
Status: DISCONTINUED | OUTPATIENT
Start: 2023-03-26 | End: 2023-03-27 | Stop reason: HOSPADM

## 2023-03-26 RX ADMIN — Medication 10 ML: at 08:15

## 2023-03-26 RX ADMIN — ONDANSETRON 4 MG: 2 INJECTION INTRAMUSCULAR; INTRAVENOUS at 08:49

## 2023-03-26 RX ADMIN — PANTOPRAZOLE SODIUM 40 MG: 40 TABLET, DELAYED RELEASE ORAL at 06:39

## 2023-03-26 RX ADMIN — GUAIFENESIN 400 MG: 400 TABLET ORAL at 20:26

## 2023-03-26 RX ADMIN — CEFAZOLIN 2000 MG: 2 INJECTION, POWDER, FOR SOLUTION INTRAMUSCULAR; INTRAVENOUS at 08:15

## 2023-03-26 RX ADMIN — IPRATROPIUM BROMIDE AND ALBUTEROL SULFATE 1 AMPULE: .5; 2.5 SOLUTION RESPIRATORY (INHALATION) at 19:27

## 2023-03-26 RX ADMIN — INSULIN GLARGINE-YFGN 45 UNITS: 100 INJECTION, SOLUTION SUBCUTANEOUS at 20:19

## 2023-03-26 RX ADMIN — OXYCODONE HYDROCHLORIDE 5 MG: 5 TABLET ORAL at 12:58

## 2023-03-26 RX ADMIN — Medication 10 ML: at 20:19

## 2023-03-26 RX ADMIN — ACETAMINOPHEN 1000 MG: 500 TABLET ORAL at 17:47

## 2023-03-26 RX ADMIN — INSULIN LISPRO 15 UNITS: 100 INJECTION, SOLUTION INTRAVENOUS; SUBCUTANEOUS at 12:58

## 2023-03-26 RX ADMIN — GUAIFENESIN 400 MG: 400 TABLET ORAL at 14:13

## 2023-03-26 RX ADMIN — BENZONATATE 100 MG: 100 CAPSULE ORAL at 14:13

## 2023-03-26 RX ADMIN — Medication 1 CAPSULE: at 08:14

## 2023-03-26 RX ADMIN — Medication 5 MG: at 20:19

## 2023-03-26 RX ADMIN — ACETAMINOPHEN 1000 MG: 500 TABLET ORAL at 12:57

## 2023-03-26 RX ADMIN — AMLODIPINE BESYLATE 5 MG: 5 TABLET ORAL at 08:15

## 2023-03-26 RX ADMIN — TIZANIDINE 4 MG: 4 TABLET ORAL at 20:19

## 2023-03-26 RX ADMIN — GUAIFENESIN 400 MG: 400 TABLET ORAL at 08:15

## 2023-03-26 RX ADMIN — ACETAMINOPHEN 1000 MG: 500 TABLET ORAL at 08:14

## 2023-03-26 RX ADMIN — CLONIDINE HYDROCHLORIDE 0.1 MG: 0.1 TABLET ORAL at 23:27

## 2023-03-26 RX ADMIN — OXYCODONE HYDROCHLORIDE 5 MG: 5 TABLET ORAL at 20:19

## 2023-03-26 RX ADMIN — CEFAZOLIN 2000 MG: 2 INJECTION, POWDER, FOR SOLUTION INTRAMUSCULAR; INTRAVENOUS at 17:47

## 2023-03-26 RX ADMIN — OXYCODONE HYDROCHLORIDE 5 MG: 5 TABLET ORAL at 06:39

## 2023-03-26 RX ADMIN — BENZONATATE 100 MG: 100 CAPSULE ORAL at 08:14

## 2023-03-26 RX ADMIN — INSULIN LISPRO 4 UNITS: 100 INJECTION, SOLUTION INTRAVENOUS; SUBCUTANEOUS at 17:47

## 2023-03-26 RX ADMIN — SERTRALINE 100 MG: 100 TABLET, FILM COATED ORAL at 08:15

## 2023-03-26 RX ADMIN — INSULIN GLARGINE-YFGN 40 UNITS: 100 INJECTION, SOLUTION SUBCUTANEOUS at 08:16

## 2023-03-26 RX ADMIN — ROSUVASTATIN CALCIUM 20 MG: 20 TABLET, FILM COATED ORAL at 20:18

## 2023-03-26 RX ADMIN — INSULIN LISPRO 15 UNITS: 100 INJECTION, SOLUTION INTRAVENOUS; SUBCUTANEOUS at 17:50

## 2023-03-26 RX ADMIN — IPRATROPIUM BROMIDE AND ALBUTEROL SULFATE 1 AMPULE: .5; 2.5 SOLUTION RESPIRATORY (INHALATION) at 09:37

## 2023-03-26 RX ADMIN — CEFAZOLIN 2000 MG: 2 INJECTION, POWDER, FOR SOLUTION INTRAMUSCULAR; INTRAVENOUS at 01:01

## 2023-03-26 RX ADMIN — INSULIN LISPRO 15 UNITS: 100 INJECTION, SOLUTION INTRAVENOUS; SUBCUTANEOUS at 08:16

## 2023-03-26 RX ADMIN — CEFAZOLIN 2000 MG: 2 INJECTION, POWDER, FOR SOLUTION INTRAMUSCULAR; INTRAVENOUS at 23:28

## 2023-03-26 ASSESSMENT — PAIN DESCRIPTION - DESCRIPTORS
DESCRIPTORS: ACHING;DISCOMFORT
DESCRIPTORS: SHARP;ACHING;DISCOMFORT
DESCRIPTORS: THROBBING;STABBING;ACHING

## 2023-03-26 ASSESSMENT — PAIN DESCRIPTION - LOCATION
LOCATION: RIB CAGE;BACK
LOCATION: CHEST
LOCATION: BACK;RIB CAGE

## 2023-03-26 ASSESSMENT — PAIN SCALES - GENERAL
PAINLEVEL_OUTOF10: 4
PAINLEVEL_OUTOF10: 7

## 2023-03-26 ASSESSMENT — PAIN DESCRIPTION - ORIENTATION
ORIENTATION: RIGHT

## 2023-03-26 NOTE — PROGRESS NOTES
IntraVENous Q8H    lactobacillus  1 capsule Oral BID    sennosides-docusate sodium  2 tablet Oral QPM    acetaminophen  1,000 mg Oral 4x daily    diclofenac sodium  4 g Topical BID    guaiFENesin  400 mg Oral TID    benzonatate  100 mg Oral TID    ipratropium-albuterol  1 ampule Inhalation Q4H WA    amLODIPine  5 mg Oral Daily    polyethylene glycol  17 g Oral BID    melatonin  5 mg Oral Nightly    pantoprazole  40 mg Oral QAM AC    rosuvastatin  20 mg Oral Nightly    sertraline  100 mg Oral Daily    sodium chloride flush  10 mL IntraVENous 2 times per day    insulin glargine  40 Units SubCUTAneous BID    insulin lispro  0-16 Units SubCUTAneous TID WC    insulin lispro  15 Units SubCUTAneous TID WC    insulin lispro  0-4 Units SubCUTAneous Nightly       PRN Meds:  sennosides-docusate sodium, tiZANidine, cloNIDine, melatonin, perflutren lipid microspheres, sodium chloride flush, sodium chloride, promethazine **OR** ondansetron, perflutren lipid microspheres, oxyCODONE, glucose, dextrose bolus **OR** dextrose bolus, glucagon (rDNA), dextrose    IV:   sodium chloride      dextrose         LABS:  Recent Results (from the past 24 hour(s))   POCT Glucose    Collection Time: 03/25/23 11:42 AM   Result Value Ref Range    Meter Glucose 205 (H) 74 - 99 mg/dL   POCT Glucose    Collection Time: 03/25/23  5:41 PM   Result Value Ref Range    Meter Glucose 176 (H) 74 - 99 mg/dL   POCT Glucose    Collection Time: 03/25/23  8:34 PM   Result Value Ref Range    Meter Glucose 208 (H) 74 - 99 mg/dL   Comprehensive Metabolic Panel    Collection Time: 03/26/23  6:32 AM   Result Value Ref Range    Sodium 141 132 - 146 mmol/L    Potassium 3.6 3.5 - 5.0 mmol/L    Chloride 102 98 - 107 mmol/L    CO2 29 22 - 29 mmol/L    Anion Gap 10 7 - 16 mmol/L    Glucose 167 (H) 74 - 99 mg/dL    BUN 10 6 - 20 mg/dL    Creatinine 0.9 0.7 - 1.2 mg/dL    Est, Glom Filt Rate >60 >=60 mL/min/1.73    Calcium 8.9 8.6 - 10.2 mg/dL    Total Protein 6.2 (L) 6.4 - and circumferential disc bulge. Moderate bilateral neural foraminal narrowing also present at L4/L5. Moderate disc space narrowing at L5/S1. 1. Large bulky osteophytes are seen along the right aspect of T6/T7, T7/T8, T8/T9, and T9/T10. There is edema and enhancement associated with these osteophytes with surrounding soft tissue edema. Findings could suggest recent trauma or repetitive injury. Follow-up MRI recommended to exclude developing osteomyelitis. No evidence of fracture. 2. Additional edema present within right aspect of T9 vertebral body which also may be related to recent trauma or repetitive injury. No evidence of T9 fracture. 3. No significant central canal stenosis involving the thoracic spine. 4. Mild central canal stenosis at L4/L5 with moderate bilateral neural foraminal narrowing. 5. Note made of a right pleural effusion with adjacent opacities likely related to pneumonia. Note made of foci of gas within the pleural effusion of possible infectious etiology. MRI LUMBAR SPINE W WO CONTRAST    Result Date: 3/21/2023  EXAMINATION: MRI OF THE THORACIC SPINE WITHOUT AND WITH CONTRAST; MRI OF THE LUMBAR SPINE WITHOUT AND WITH CONTRAST  3/21/2023 1:40 pm TECHNIQUE: Multiplanar multisequence MRI of the thoracic spine was performed without and with the administration of intravenous contrast.; Multiplanar multisequence MRI of the lumbar spine was performed without and with the administration of intravenous contrast. COMPARISON: Correlation made with view of the lower thoracic spine and lumbar spine from CT abdomen and pelvis performed today. HISTORY: ORDERING SYSTEM PROVIDED HISTORY: pain, herniation t11-12 TECHNOLOGIST PROVIDED HISTORY: Reason for exam:->pain, herniation t11-12 What reading provider will be dictating this exam?->CRC FINDINGS: 20 mL MultiHance utilized Large bulky osteophytes are seen along right aspect of T6/T7, T7/T8, T8/T9, and T9/T10.   There is enhancement associated with

## 2023-03-26 NOTE — PROGRESS NOTES
hours) at 3/26/2023 1412  Last data filed at 3/26/2023 0858  Gross per 24 hour   Intake 1080 ml   Output --   Net 1080 ml     CURRENT PULSE OXIMETRY:  SpO2: 93 %  24HR PULSE OXIMETRY RANGE:  SpO2  Av.3 %  Min: 90 %  Max: 93 %  CVP:    VENT SETTINGS:      Additional Respiratory Assessments  Heart Rate: 88  Resp: 18  SpO2: 93 %      EXAM:  General: No distress. Alert. Room air  Eyes: PERRL. No sclera icterus. No conjunctival injection. ENT: No discharge. Pharynx clear. Neck: Trachea midline. Normal thyroid. Resp: No accessory muscle use. No crackles. No wheezing. No rhonchi. Diminished on the right otherwise clear  CV: Regular rate. Regular rhythm. No mumur or rub. No edema. ABD: Non-tender. Non-distended. No masses. No organmegaly. Normal bowel sounds. Skin: Warm and dry. No nodule on exposed extremities. No rash on exposed extremities. M/S: No cyanosis. No joint deformity. No clubbing. Neuro: Awake. Follows commands. A&Ox3    I/O: I/O last 3 completed shifts: In: 1320 [P.O.:1320]  Out: -   I/O this shift:  In: 240 [P.O.:240]  Out: -      Results:  CBC:   Recent Labs     23  0802 23  0614 23  0632   WBC 11.6* 9.9 11.1   HGB 13.0 12.6 13.2   HCT 40.3 39.7 40.4   MCV 88.6 89.2 88.0    205 233     BMP:   Recent Labs     23  0802 23  0614 23  0632    136 141   K 4.1 3.7 3.6    100 102   CO2 28 27 29   BUN 15 14 10   CREATININE 0.8 0.7 0.9     LFT:   Recent Labs     23  0632   ALKPHOS 226*   ALT 91*   AST 62*   PROT 6.2*   BILITOT 0.3   LABALBU 2.7*     PT/INR:   Recent Labs     23  0632   PROTIME 14.0*   INR 1.3     Cultures:       ABG:   No results for input(s): PH, PO2, PCO2, HCO3, BE, O2SAT in the last 72 hours.     Films:  CT ABDOMEN PELVIS WO CONTRAST Additional Contrast? None    Result Date: 3/21/2023  EXAMINATION: CT OF THE ABDOMEN AND PELVIS WITHOUT CONTRAST 3/21/2023 8:54 am TECHNIQUE: CT of the abdomen and pelvis was performed without the administration of intravenous contrast. Multiplanar reformatted images are provided for review. Automated exposure control, iterative reconstruction, and/or weight based adjustment of the mA/kV was utilized to reduce the radiation dose to as low as reasonably achievable. COMPARISON: None. HISTORY: ORDERING SYSTEM PROVIDED HISTORY: pain TECHNOLOGIST PROVIDED HISTORY: Additional Contrast?->None Reason for exam:->pain Decision Support Exception - unselect if not a suspected or confirmed emergency medical condition->Emergency Medical Condition (MA) What reading provider will be dictating this exam?->CRC FINDINGS: Mild gynecomastia. Small fat filled umbilical hernia. Fat filled right inguinal hernia. Small right pleural effusion with associated atelectasis or pneumonia in the right lung base. Small foci of gas in the right pleural space may represent infection. No pneumoperitoneum. Evaluation for neoplasm, infection, focal lesions, and trauma is limited without the use of IV contrast.  Suspect fatty infiltration of the liver. Gallbladder negative. Spleen mildly enlarged 14 cm craniocaudal.  Pancreas and adrenal glands negative. No hydronephrosis. Bowel negative for obstruction or inflammation. Large colonic stool burden. Normal appendix. Atherosclerotic nondilated aorta. No suspicious lymphadenopathy or significant ascites. Macro bladder Bones negative. Small right pleural effusion with atelectasis/pneumonia in the right lung base. Foci of gas in the right pleural space may relate to infection. The No acute abdominal findings. Constipation. XR CHEST (2 VW)    Result Date: 3/21/2023  EXAMINATION: TWO XRAY VIEWS OF THE CHEST 3/21/2023 8:13 am COMPARISON: 19 August 2022 HISTORY: ORDERING SYSTEM PROVIDED HISTORY: pain TECHNOLOGIST PROVIDED HISTORY: Reason for exam:->pain What reading provider will be dictating this exam?->CRC FINDINGS: Suboptimal inspiration.   New right pleural effusion with

## 2023-03-26 NOTE — PROGRESS NOTES
CC: back pain and right chest pain    Brief HPI:  Patient seen with Dr. Balbir Perla. Awake, alert. No complaints. Past Medical History:   Diagnosis Date    Diabetes mellitus [facility])      Past Surgical History:   Procedure Laterality Date    BACK SURGERY  2010    discectomy    LAMINECTOMY Right 8/26/2022    L4-S1 LAMINECTOMY, RIGHT L5-S1 DISCECTOMY performed by Glorya Sicard, MD at [place] [address] History     Socioeconomic History    Marital status:      Spouse name: Not on file    Number of children: Not on file    Years of education: Not on file    Highest education level: Not on file   Occupational History    Not on file   Tobacco Use    Smoking status: Never    Smokeless tobacco: Never   Vaping Use    Vaping Use: Never used   Substance and Sexual Activity    Alcohol use: Not Currently    Drug use: Never    Sexual activity: Not on file   Other Topics Concern    Not on file   Social History Narrative    Not on file     Social Determinants of Health     Financial Resource Strain: Not on file   Food Insecurity: Not on file   Transportation Needs: Not on file   Physical Activity: Not on file   Stress: Not on file   Social Connections: Not on file   Intimate Partner Violence: Not on file   Housing Stability: Not on file     History reviewed. No pertinent family history.     Vitals:    03/25/23 1509 03/25/23 2019 03/25/23 2111 03/26/23 0738   BP: (!) 150/71 (!) 143/76  (!) 155/83   Pulse: 89 98  88   Resp: 16 18 18 18   Temp: 98.7 °F (37.1 °C) 98.8 °F (37.1 °C)  98 °F (36.7 °C)   TempSrc: Temporal   Temporal   SpO2: 91% 90%  93%   Weight:       Height:               Intake/Output Summary (Last 24 hours) at 3/26/2023 0804  Last data filed at 3/25/2023 1823  Gross per 24 hour   Intake 1320 ml   Output --   Net 1320 ml         Recent Labs     03/24/23  0802 03/25/23  0614 03/26/23  0632   WBC 11.6* 9.9 11.1   HGB 13.0 12.6 13.2   HCT 40.3 39.7 40.4    205 233      Recent Labs     03/24/23  0802 03/25/23  5655

## 2023-03-26 NOTE — PROGRESS NOTES
[facility] PROGRESS NOTE      Chief complaint: Follow-up of Staphylococcus aureus bacteremia    The patient is a 56 y.o. male with history of DM, L4-S1 laminectomy in 08/2022, presented on 03/21 with back pain for a week after he was using a sledge hammer at work. On admission, he was afebrile and hemodynamically stable with leukocytosis of 22,000. Chest x-ray showed new right pleural effusion with adjacent atelectasis and/or infiltrate. CT abdomen and pelvis showed small right pleural effusion with infiltrate in the right lung base, no acute abdominal findings. MRI thoracolumbar spine with and without contrast showed large bulky osteophytes along the right aspect of T6/T7, T7/T8, T8/T9, T9/T10 with edema and enhancement suggestive of recent trauma or repetitive injury, mild central canal stenosis at L4/L5 with moderate bilateral neural foraminal narrowing. Blood cultures showed MSSA. He received a dose of ceftriaxone on admission. Azithromycin and vancomycin were started on admission then switched to cefazolin on 03/23. CT chest showed loculated moderate right pleural effusion with atelectatic changes of largely atelectatic right middle and lower lobe with scattered gas densities in the right lower lobe pleural portion. Transthoracic echocardiogram showed no evidence of endocarditis. Subjective: Patient was seen and examined. No chills, no abdominal pain, no diarrhea, no rash, no itching, has unchanged right subcostal and middle back pain.       Objective:  BP (!) 155/83   Pulse 88   Temp 98 °F (36.7 °C) (Temporal)   Resp 18   Ht 6' 3\" (1.905 m)   Wt 240 lb (108.9 kg)   SpO2 93%   BMI 30.00 kg/m²   Constitutional: Alert, not in distress  Respiratory: Clear breath sounds, no crackles, no wheezes  Cardiovascular: Regular rate and rhythm, no murmurs  Gastrointestinal: Bowel sounds present, soft, nontender  Skin: Warm and dry, no active dermatoses  Musculoskeletal: No joint swelling, no joint joint erythema    Labs, imaging, and medical records/notes were personally reviewed. Assessment:  Sepsis, resolved, secondary to MSSA bacteremia, etiology unclear, suspect associated with loculated pneumonia +/-empyema. Transthoracic echocardiogram showed no evidence of endocarditis. Recommendations:  Continue cefazolin 2 g every 8 hours. Follow-up blood cultures. Plan for right VATS with drainage of pleural effusion/empyema and possible decortication per Cardiothoracic Surgery is noted. Continue supportive care. Thank you for involving me in the care of Carmela Renteria. I will continue to follow. Please do not hesitate to call for any questions or concerns.       Electronically signed by Monique Locke MD on 3/26/2023 at 10:26 AM

## 2023-03-27 ENCOUNTER — ANESTHESIA (OUTPATIENT)
Dept: OPERATING ROOM | Age: 58
End: 2023-03-27
Payer: OTHER MISCELLANEOUS

## 2023-03-27 ENCOUNTER — ANESTHESIA EVENT (OUTPATIENT)
Dept: OPERATING ROOM | Age: 58
End: 2023-03-27
Payer: OTHER MISCELLANEOUS

## 2023-03-27 ENCOUNTER — APPOINTMENT (OUTPATIENT)
Dept: GENERAL RADIOLOGY | Age: 58
DRG: 853 | End: 2023-03-27
Payer: OTHER MISCELLANEOUS

## 2023-03-27 LAB
ANION GAP SERPL CALCULATED.3IONS-SCNC: 12 MMOL/L (ref 7–16)
BACTERIA BLD CULT ORG #2: NORMAL
BACTERIA BLD CULT: ABNORMAL
BACTERIA BLD CULT: ABNORMAL
BACTERIA BLD CULT: NORMAL
BUN SERPL-MCNC: 11 MG/DL (ref 6–20)
CALCIUM SERPL-MCNC: 8.4 MG/DL (ref 8.6–10.2)
CHLORIDE SERPL-SCNC: 106 MMOL/L (ref 98–107)
CO2 SERPL-SCNC: 25 MMOL/L (ref 22–29)
CREAT SERPL-MCNC: 0.9 MG/DL (ref 0.7–1.2)
ERYTHROCYTE [DISTWIDTH] IN BLOOD BY AUTOMATED COUNT: 11.9 FL (ref 11.5–15)
GLUCOSE SERPL-MCNC: 258 MG/DL (ref 74–99)
HCT VFR BLD AUTO: 38.7 % (ref 37–54)
HGB BLD-MCNC: 12.6 G/DL (ref 12.5–16.5)
MAGNESIUM SERPL-MCNC: 1.8 MG/DL (ref 1.6–2.6)
MCH RBC QN AUTO: 28.9 PG (ref 26–35)
MCHC RBC AUTO-ENTMCNC: 32.6 % (ref 32–34.5)
MCV RBC AUTO: 88.8 FL (ref 80–99.9)
METER GLUCOSE: 186 MG/DL (ref 74–99)
METER GLUCOSE: 290 MG/DL (ref 74–99)
METER GLUCOSE: 430 MG/DL (ref 74–99)
METER GLUCOSE: 454 MG/DL (ref 74–99)
ORGANISM: ABNORMAL
PLATELET # BLD AUTO: 353 E9/L (ref 130–450)
PMV BLD AUTO: 9.1 FL (ref 7–12)
POTASSIUM SERPL-SCNC: 5 MMOL/L (ref 3.5–5)
RBC # BLD AUTO: 4.36 E12/L (ref 3.8–5.8)
SODIUM SERPL-SCNC: 143 MMOL/L (ref 132–146)
WBC # BLD: 16.1 E9/L (ref 4.5–11.5)

## 2023-03-27 PROCEDURE — 3E0T3BZ INTRODUCTION OF ANESTHETIC AGENT INTO PERIPHERAL NERVES AND PLEXI, PERCUTANEOUS APPROACH: ICD-10-PCS | Performed by: STUDENT IN AN ORGANIZED HEALTH CARE EDUCATION/TRAINING PROGRAM

## 2023-03-27 PROCEDURE — 87205 SMEAR GRAM STAIN: CPT

## 2023-03-27 PROCEDURE — 0B9N8ZZ DRAINAGE OF RIGHT PLEURA, VIA NATURAL OR ARTIFICIAL OPENING ENDOSCOPIC: ICD-10-PCS | Performed by: STUDENT IN AN ORGANIZED HEALTH CARE EDUCATION/TRAINING PROGRAM

## 2023-03-27 PROCEDURE — 71045 X-RAY EXAM CHEST 1 VIEW: CPT

## 2023-03-27 PROCEDURE — 6370000000 HC RX 637 (ALT 250 FOR IP): Performed by: NURSE ANESTHETIST, CERTIFIED REGISTERED

## 2023-03-27 PROCEDURE — 36415 COLL VENOUS BLD VENIPUNCTURE: CPT

## 2023-03-27 PROCEDURE — 85027 COMPLETE CBC AUTOMATED: CPT

## 2023-03-27 PROCEDURE — S5553 INSULIN LONG ACTING 5 U: HCPCS

## 2023-03-27 PROCEDURE — 6370000000 HC RX 637 (ALT 250 FOR IP): Performed by: HOSPITALIST

## 2023-03-27 PROCEDURE — 6370000000 HC RX 637 (ALT 250 FOR IP)

## 2023-03-27 PROCEDURE — 6370000000 HC RX 637 (ALT 250 FOR IP): Performed by: INTERNAL MEDICINE

## 2023-03-27 PROCEDURE — 6360000002 HC RX W HCPCS: Performed by: NURSE ANESTHETIST, CERTIFIED REGISTERED

## 2023-03-27 PROCEDURE — 2700000000 HC OXYGEN THERAPY PER DAY

## 2023-03-27 PROCEDURE — 6360000002 HC RX W HCPCS: Performed by: NURSE PRACTITIONER

## 2023-03-27 PROCEDURE — 2140000000 HC CCU INTERMEDIATE R&B

## 2023-03-27 PROCEDURE — 87075 CULTR BACTERIA EXCEPT BLOOD: CPT

## 2023-03-27 PROCEDURE — 7100000000 HC PACU RECOVERY - FIRST 15 MIN: Performed by: STUDENT IN AN ORGANIZED HEALTH CARE EDUCATION/TRAINING PROGRAM

## 2023-03-27 PROCEDURE — 3600000014 HC SURGERY LEVEL 4 ADDTL 15MIN: Performed by: STUDENT IN AN ORGANIZED HEALTH CARE EDUCATION/TRAINING PROGRAM

## 2023-03-27 PROCEDURE — 2580000003 HC RX 258: Performed by: HOSPITALIST

## 2023-03-27 PROCEDURE — 87116 MYCOBACTERIA CULTURE: CPT

## 2023-03-27 PROCEDURE — 3600000004 HC SURGERY LEVEL 4 BASE: Performed by: STUDENT IN AN ORGANIZED HEALTH CARE EDUCATION/TRAINING PROGRAM

## 2023-03-27 PROCEDURE — 2500000003 HC RX 250 WO HCPCS: Performed by: STUDENT IN AN ORGANIZED HEALTH CARE EDUCATION/TRAINING PROGRAM

## 2023-03-27 PROCEDURE — 6370000000 HC RX 637 (ALT 250 FOR IP): Performed by: NURSE PRACTITIONER

## 2023-03-27 PROCEDURE — 3700000001 HC ADD 15 MINUTES (ANESTHESIA): Performed by: STUDENT IN AN ORGANIZED HEALTH CARE EDUCATION/TRAINING PROGRAM

## 2023-03-27 PROCEDURE — 87015 SPECIMEN INFECT AGNT CONCNTJ: CPT

## 2023-03-27 PROCEDURE — 2580000003 HC RX 258: Performed by: NURSE PRACTITIONER

## 2023-03-27 PROCEDURE — 2580000003 HC RX 258

## 2023-03-27 PROCEDURE — 6360000002 HC RX W HCPCS: Performed by: INTERNAL MEDICINE

## 2023-03-27 PROCEDURE — 3700000000 HC ANESTHESIA ATTENDED CARE: Performed by: STUDENT IN AN ORGANIZED HEALTH CARE EDUCATION/TRAINING PROGRAM

## 2023-03-27 PROCEDURE — 2580000003 HC RX 258: Performed by: INTERNAL MEDICINE

## 2023-03-27 PROCEDURE — 87070 CULTURE OTHR SPECIMN AEROBIC: CPT

## 2023-03-27 PROCEDURE — 2580000003 HC RX 258: Performed by: NURSE ANESTHETIST, CERTIFIED REGISTERED

## 2023-03-27 PROCEDURE — 7100000001 HC PACU RECOVERY - ADDTL 15 MIN: Performed by: STUDENT IN AN ORGANIZED HEALTH CARE EDUCATION/TRAINING PROGRAM

## 2023-03-27 PROCEDURE — 2500000003 HC RX 250 WO HCPCS: Performed by: NURSE ANESTHETIST, CERTIFIED REGISTERED

## 2023-03-27 PROCEDURE — C1729 CATH, DRAINAGE: HCPCS | Performed by: STUDENT IN AN ORGANIZED HEALTH CARE EDUCATION/TRAINING PROGRAM

## 2023-03-27 PROCEDURE — 80048 BASIC METABOLIC PNL TOTAL CA: CPT

## 2023-03-27 PROCEDURE — 2709999900 HC NON-CHARGEABLE SUPPLY: Performed by: STUDENT IN AN ORGANIZED HEALTH CARE EDUCATION/TRAINING PROGRAM

## 2023-03-27 PROCEDURE — 94640 AIRWAY INHALATION TREATMENT: CPT

## 2023-03-27 PROCEDURE — 82962 GLUCOSE BLOOD TEST: CPT

## 2023-03-27 PROCEDURE — 83735 ASSAY OF MAGNESIUM: CPT

## 2023-03-27 PROCEDURE — 87102 FUNGUS ISOLATION CULTURE: CPT

## 2023-03-27 PROCEDURE — 32652 THORACOSCOPY REM TOTL CORTEX: CPT | Performed by: STUDENT IN AN ORGANIZED HEALTH CARE EDUCATION/TRAINING PROGRAM

## 2023-03-27 PROCEDURE — 6360000002 HC RX W HCPCS

## 2023-03-27 PROCEDURE — 6360000002 HC RX W HCPCS: Performed by: ANESTHESIOLOGY

## 2023-03-27 PROCEDURE — 0BNK8ZZ RELEASE RIGHT LUNG, VIA NATURAL OR ARTIFICIAL OPENING ENDOSCOPIC: ICD-10-PCS | Performed by: STUDENT IN AN ORGANIZED HEALTH CARE EDUCATION/TRAINING PROGRAM

## 2023-03-27 PROCEDURE — 87206 SMEAR FLUORESCENT/ACID STAI: CPT

## 2023-03-27 RX ORDER — ROCURONIUM BROMIDE 10 MG/ML
INJECTION, SOLUTION INTRAVENOUS PRN
Status: DISCONTINUED | OUTPATIENT
Start: 2023-03-27 | End: 2023-03-27 | Stop reason: SDUPTHER

## 2023-03-27 RX ORDER — MORPHINE SULFATE 2 MG/ML
2 INJECTION, SOLUTION INTRAMUSCULAR; INTRAVENOUS EVERY 4 HOURS PRN
Status: DISCONTINUED | OUTPATIENT
Start: 2023-03-27 | End: 2023-03-30 | Stop reason: HOSPADM

## 2023-03-27 RX ORDER — SODIUM CHLORIDE 0.9 % (FLUSH) 0.9 %
5-40 SYRINGE (ML) INJECTION PRN
Status: DISCONTINUED | OUTPATIENT
Start: 2023-03-27 | End: 2023-03-30 | Stop reason: HOSPADM

## 2023-03-27 RX ORDER — ENOXAPARIN SODIUM 100 MG/ML
30 INJECTION SUBCUTANEOUS 2 TIMES DAILY
Status: DISCONTINUED | OUTPATIENT
Start: 2023-03-28 | End: 2023-03-30 | Stop reason: HOSPADM

## 2023-03-27 RX ORDER — SODIUM CHLORIDE 0.9 % (FLUSH) 0.9 %
5-40 SYRINGE (ML) INJECTION EVERY 12 HOURS SCHEDULED
Status: DISCONTINUED | OUTPATIENT
Start: 2023-03-27 | End: 2023-03-27 | Stop reason: HOSPADM

## 2023-03-27 RX ORDER — DROPERIDOL 2.5 MG/ML
0.62 INJECTION, SOLUTION INTRAMUSCULAR; INTRAVENOUS
Status: DISCONTINUED | OUTPATIENT
Start: 2023-03-27 | End: 2023-03-27 | Stop reason: HOSPADM

## 2023-03-27 RX ORDER — NITROGLYCERIN 5 MG/ML
INJECTION, SOLUTION INTRAVENOUS PRN
Status: DISCONTINUED | OUTPATIENT
Start: 2023-03-27 | End: 2023-03-27 | Stop reason: SDUPTHER

## 2023-03-27 RX ORDER — ACETAMINOPHEN 325 MG/1
650 TABLET ORAL EVERY 6 HOURS
Status: DISCONTINUED | OUTPATIENT
Start: 2023-03-27 | End: 2023-03-30 | Stop reason: HOSPADM

## 2023-03-27 RX ORDER — ONDANSETRON 2 MG/ML
INJECTION INTRAMUSCULAR; INTRAVENOUS PRN
Status: DISCONTINUED | OUTPATIENT
Start: 2023-03-27 | End: 2023-03-27 | Stop reason: SDUPTHER

## 2023-03-27 RX ORDER — MIDAZOLAM HYDROCHLORIDE 1 MG/ML
INJECTION INTRAMUSCULAR; INTRAVENOUS PRN
Status: DISCONTINUED | OUTPATIENT
Start: 2023-03-27 | End: 2023-03-27 | Stop reason: SDUPTHER

## 2023-03-27 RX ORDER — SODIUM CHLORIDE 0.9 % (FLUSH) 0.9 %
5-40 SYRINGE (ML) INJECTION PRN
Status: DISCONTINUED | OUTPATIENT
Start: 2023-03-27 | End: 2023-03-27 | Stop reason: HOSPADM

## 2023-03-27 RX ORDER — ONDANSETRON 4 MG/1
4 TABLET, ORALLY DISINTEGRATING ORAL EVERY 8 HOURS PRN
Status: DISCONTINUED | OUTPATIENT
Start: 2023-03-27 | End: 2023-03-30 | Stop reason: HOSPADM

## 2023-03-27 RX ORDER — FENTANYL CITRATE 50 UG/ML
INJECTION, SOLUTION INTRAMUSCULAR; INTRAVENOUS PRN
Status: DISCONTINUED | OUTPATIENT
Start: 2023-03-27 | End: 2023-03-27 | Stop reason: SDUPTHER

## 2023-03-27 RX ORDER — SODIUM CHLORIDE 0.9 % (FLUSH) 0.9 %
5-40 SYRINGE (ML) INJECTION EVERY 12 HOURS SCHEDULED
Status: DISCONTINUED | OUTPATIENT
Start: 2023-03-27 | End: 2023-03-30 | Stop reason: HOSPADM

## 2023-03-27 RX ORDER — BUPIVACAINE HYDROCHLORIDE AND EPINEPHRINE 5; 5 MG/ML; UG/ML
INJECTION, SOLUTION EPIDURAL; INTRACAUDAL; PERINEURAL PRN
Status: DISCONTINUED | OUTPATIENT
Start: 2023-03-27 | End: 2023-03-27 | Stop reason: ALTCHOICE

## 2023-03-27 RX ORDER — DEXAMETHASONE SODIUM PHOSPHATE 10 MG/ML
INJECTION INTRAMUSCULAR; INTRAVENOUS PRN
Status: DISCONTINUED | OUTPATIENT
Start: 2023-03-27 | End: 2023-03-27 | Stop reason: SDUPTHER

## 2023-03-27 RX ORDER — LABETALOL HYDROCHLORIDE 5 MG/ML
5 INJECTION, SOLUTION INTRAVENOUS
Status: DISCONTINUED | OUTPATIENT
Start: 2023-03-27 | End: 2023-03-27 | Stop reason: HOSPADM

## 2023-03-27 RX ORDER — LIDOCAINE HYDROCHLORIDE 20 MG/ML
INJECTION, SOLUTION INTRAVENOUS PRN
Status: DISCONTINUED | OUTPATIENT
Start: 2023-03-27 | End: 2023-03-27 | Stop reason: SDUPTHER

## 2023-03-27 RX ORDER — HYDRALAZINE HYDROCHLORIDE 20 MG/ML
10 INJECTION INTRAMUSCULAR; INTRAVENOUS EVERY 4 HOURS PRN
Status: DISCONTINUED | OUTPATIENT
Start: 2023-03-27 | End: 2023-03-30 | Stop reason: HOSPADM

## 2023-03-27 RX ORDER — SODIUM CHLORIDE 9 MG/ML
INJECTION, SOLUTION INTRAVENOUS PRN
Status: DISCONTINUED | OUTPATIENT
Start: 2023-03-27 | End: 2023-03-30 | Stop reason: HOSPADM

## 2023-03-27 RX ORDER — MIDAZOLAM HYDROCHLORIDE 2 MG/2ML
2 INJECTION, SOLUTION INTRAMUSCULAR; INTRAVENOUS
Status: DISCONTINUED | OUTPATIENT
Start: 2023-03-27 | End: 2023-03-27 | Stop reason: HOSPADM

## 2023-03-27 RX ORDER — POLYETHYLENE GLYCOL 3350 17 G/17G
17 POWDER, FOR SOLUTION ORAL DAILY
Status: DISCONTINUED | OUTPATIENT
Start: 2023-03-27 | End: 2023-03-27 | Stop reason: SDUPTHER

## 2023-03-27 RX ORDER — IPRATROPIUM BROMIDE AND ALBUTEROL SULFATE 2.5; .5 MG/3ML; MG/3ML
1 SOLUTION RESPIRATORY (INHALATION)
Status: DISCONTINUED | OUTPATIENT
Start: 2023-03-27 | End: 2023-03-30 | Stop reason: HOSPADM

## 2023-03-27 RX ORDER — ACETAMINOPHEN 325 MG/1
650 TABLET ORAL
Status: DISCONTINUED | OUTPATIENT
Start: 2023-03-27 | End: 2023-03-27 | Stop reason: HOSPADM

## 2023-03-27 RX ORDER — SODIUM CHLORIDE 9 MG/ML
INJECTION, SOLUTION INTRAVENOUS CONTINUOUS PRN
Status: DISCONTINUED | OUTPATIENT
Start: 2023-03-27 | End: 2023-03-27 | Stop reason: SDUPTHER

## 2023-03-27 RX ORDER — PROPOFOL 10 MG/ML
INJECTION, EMULSION INTRAVENOUS PRN
Status: DISCONTINUED | OUTPATIENT
Start: 2023-03-27 | End: 2023-03-27 | Stop reason: SDUPTHER

## 2023-03-27 RX ORDER — ENOXAPARIN SODIUM 100 MG/ML
40 INJECTION SUBCUTANEOUS DAILY
Status: DISCONTINUED | OUTPATIENT
Start: 2023-03-28 | End: 2023-03-27

## 2023-03-27 RX ORDER — OXYCODONE HYDROCHLORIDE 5 MG/1
5 TABLET ORAL EVERY 4 HOURS PRN
Status: DISCONTINUED | OUTPATIENT
Start: 2023-03-27 | End: 2023-03-28

## 2023-03-27 RX ORDER — ALBUTEROL SULFATE 90 UG/1
AEROSOL, METERED RESPIRATORY (INHALATION) PRN
Status: DISCONTINUED | OUTPATIENT
Start: 2023-03-27 | End: 2023-03-27 | Stop reason: SDUPTHER

## 2023-03-27 RX ORDER — ONDANSETRON 2 MG/ML
4 INJECTION INTRAMUSCULAR; INTRAVENOUS
Status: DISCONTINUED | OUTPATIENT
Start: 2023-03-27 | End: 2023-03-27 | Stop reason: HOSPADM

## 2023-03-27 RX ORDER — ONDANSETRON 2 MG/ML
4 INJECTION INTRAMUSCULAR; INTRAVENOUS EVERY 6 HOURS PRN
Status: DISCONTINUED | OUTPATIENT
Start: 2023-03-27 | End: 2023-03-30 | Stop reason: HOSPADM

## 2023-03-27 RX ORDER — IPRATROPIUM BROMIDE AND ALBUTEROL SULFATE 2.5; .5 MG/3ML; MG/3ML
1 SOLUTION RESPIRATORY (INHALATION)
Status: DISCONTINUED | OUTPATIENT
Start: 2023-03-27 | End: 2023-03-27 | Stop reason: HOSPADM

## 2023-03-27 RX ORDER — HYDRALAZINE HYDROCHLORIDE 20 MG/ML
5 INJECTION INTRAMUSCULAR; INTRAVENOUS
Status: DISCONTINUED | OUTPATIENT
Start: 2023-03-27 | End: 2023-03-27 | Stop reason: HOSPADM

## 2023-03-27 RX ORDER — BISACODYL 5 MG/1
5 TABLET, DELAYED RELEASE ORAL DAILY PRN
Status: DISCONTINUED | OUTPATIENT
Start: 2023-03-27 | End: 2023-03-30 | Stop reason: HOSPADM

## 2023-03-27 RX ORDER — DIPHENHYDRAMINE HYDROCHLORIDE 50 MG/ML
12.5 INJECTION INTRAMUSCULAR; INTRAVENOUS
Status: DISCONTINUED | OUTPATIENT
Start: 2023-03-27 | End: 2023-03-27 | Stop reason: HOSPADM

## 2023-03-27 RX ORDER — SODIUM CHLORIDE 9 MG/ML
25 INJECTION, SOLUTION INTRAVENOUS PRN
Status: DISCONTINUED | OUTPATIENT
Start: 2023-03-27 | End: 2023-03-27 | Stop reason: HOSPADM

## 2023-03-27 RX ADMIN — NITROGLYCERIN 25 MCG: 5 INJECTION, SOLUTION INTRAVENOUS at 11:51

## 2023-03-27 RX ADMIN — SENNOSIDES AND DOCUSATE SODIUM 2 TABLET: 8.6; 5 TABLET ORAL at 18:15

## 2023-03-27 RX ADMIN — PANTOPRAZOLE SODIUM 40 MG: 40 TABLET, DELAYED RELEASE ORAL at 06:14

## 2023-03-27 RX ADMIN — ROCURONIUM BROMIDE 30 MG: 10 INJECTION, SOLUTION INTRAVENOUS at 11:29

## 2023-03-27 RX ADMIN — HYDROMORPHONE HYDROCHLORIDE 0.5 MG: 1 INJECTION, SOLUTION INTRAMUSCULAR; INTRAVENOUS; SUBCUTANEOUS at 13:38

## 2023-03-27 RX ADMIN — ROCURONIUM BROMIDE 50 MG: 10 INJECTION, SOLUTION INTRAVENOUS at 11:12

## 2023-03-27 RX ADMIN — OXYCODONE HYDROCHLORIDE 5 MG: 5 TABLET ORAL at 22:39

## 2023-03-27 RX ADMIN — POLYETHYLENE GLYCOL 3350 17 G: 17 POWDER, FOR SOLUTION ORAL at 22:37

## 2023-03-27 RX ADMIN — MIDAZOLAM 2 MG: 1 INJECTION INTRAMUSCULAR; INTRAVENOUS at 10:40

## 2023-03-27 RX ADMIN — FENTANYL CITRATE 100 MCG: 50 INJECTION, SOLUTION INTRAMUSCULAR; INTRAVENOUS at 11:24

## 2023-03-27 RX ADMIN — OXYCODONE HYDROCHLORIDE 5 MG: 5 TABLET ORAL at 18:13

## 2023-03-27 RX ADMIN — IPRATROPIUM BROMIDE AND ALBUTEROL SULFATE 1 AMPULE: 2.5; .5 SOLUTION RESPIRATORY (INHALATION) at 21:15

## 2023-03-27 RX ADMIN — SODIUM CHLORIDE: 9 INJECTION, SOLUTION INTRAVENOUS at 11:06

## 2023-03-27 RX ADMIN — CEFAZOLIN 2000 MG: 2 INJECTION, POWDER, FOR SOLUTION INTRAMUSCULAR; INTRAVENOUS at 10:55

## 2023-03-27 RX ADMIN — Medication 1 CAPSULE: at 22:38

## 2023-03-27 RX ADMIN — HYDROMORPHONE HYDROCHLORIDE 0.5 MG: 1 INJECTION, SOLUTION INTRAMUSCULAR; INTRAVENOUS; SUBCUTANEOUS at 13:55

## 2023-03-27 RX ADMIN — INSULIN GLARGINE-YFGN 45 UNITS: 100 INJECTION, SOLUTION SUBCUTANEOUS at 22:37

## 2023-03-27 RX ADMIN — PROPOFOL 200 MG: 10 INJECTION, EMULSION INTRAVENOUS at 11:12

## 2023-03-27 RX ADMIN — HYDROMORPHONE HYDROCHLORIDE 0.5 MG: 1 INJECTION, SOLUTION INTRAMUSCULAR; INTRAVENOUS; SUBCUTANEOUS at 13:46

## 2023-03-27 RX ADMIN — GUAIFENESIN 400 MG: 400 TABLET ORAL at 22:38

## 2023-03-27 RX ADMIN — INSULIN LISPRO 8 UNITS: 100 INJECTION, SOLUTION INTRAVENOUS; SUBCUTANEOUS at 16:38

## 2023-03-27 RX ADMIN — SUGAMMADEX 200 MG: 100 INJECTION, SOLUTION INTRAVENOUS at 13:03

## 2023-03-27 RX ADMIN — LIDOCAINE HYDROCHLORIDE 100 MG: 20 INJECTION, SOLUTION INTRAVENOUS at 11:12

## 2023-03-27 RX ADMIN — CEFAZOLIN 2000 MG: 2 INJECTION, POWDER, FOR SOLUTION INTRAMUSCULAR; INTRAVENOUS at 09:33

## 2023-03-27 RX ADMIN — BENZONATATE 100 MG: 100 CAPSULE ORAL at 22:39

## 2023-03-27 RX ADMIN — ACETAMINOPHEN 325MG 650 MG: 325 TABLET ORAL at 18:14

## 2023-03-27 RX ADMIN — CEFAZOLIN 2000 MG: 2 INJECTION, POWDER, FOR SOLUTION INTRAMUSCULAR; INTRAVENOUS at 18:15

## 2023-03-27 RX ADMIN — Medication 5 ML: at 23:42

## 2023-03-27 RX ADMIN — ALBUTEROL SULFATE 8 PUFF: 90 AEROSOL, METERED RESPIRATORY (INHALATION) at 11:40

## 2023-03-27 RX ADMIN — ROSUVASTATIN CALCIUM 20 MG: 20 TABLET, FILM COATED ORAL at 22:37

## 2023-03-27 RX ADMIN — CEFAZOLIN 2000 MG: 2 INJECTION, POWDER, FOR SOLUTION INTRAMUSCULAR; INTRAVENOUS at 06:03

## 2023-03-27 RX ADMIN — INSULIN LISPRO 4 UNITS: 100 INJECTION, SOLUTION INTRAVENOUS; SUBCUTANEOUS at 22:38

## 2023-03-27 RX ADMIN — Medication 10 ML: at 23:03

## 2023-03-27 RX ADMIN — PROPOFOL 30 MG: 10 INJECTION, EMULSION INTRAVENOUS at 12:49

## 2023-03-27 RX ADMIN — ONDANSETRON 4 MG: 2 INJECTION INTRAMUSCULAR; INTRAVENOUS at 12:36

## 2023-03-27 RX ADMIN — Medication 10 ML: at 09:33

## 2023-03-27 RX ADMIN — CHLORHEXIDINE GLUCONATE 236 ML: 4 SOLUTION TOPICAL at 06:03

## 2023-03-27 RX ADMIN — INSULIN LISPRO 15 UNITS: 100 INJECTION, SOLUTION INTRAVENOUS; SUBCUTANEOUS at 16:34

## 2023-03-27 RX ADMIN — PHENYLEPHRINE HYDROCHLORIDE 50 MCG: 10 INJECTION INTRAVENOUS at 12:15

## 2023-03-27 RX ADMIN — Medication 5 MG: at 22:37

## 2023-03-27 RX ADMIN — OXYCODONE HYDROCHLORIDE 5 MG: 5 TABLET ORAL at 06:04

## 2023-03-27 RX ADMIN — ROCURONIUM BROMIDE 20 MG: 10 INJECTION, SOLUTION INTRAVENOUS at 12:12

## 2023-03-27 RX ADMIN — PROPOFOL 100 MG: 10 INJECTION, EMULSION INTRAVENOUS at 11:23

## 2023-03-27 RX ADMIN — DEXAMETHASONE SODIUM PHOSPHATE 10 MG: 10 INJECTION INTRAMUSCULAR; INTRAVENOUS at 11:12

## 2023-03-27 RX ADMIN — CHLORHEXIDINE GLUCONATE 0.12% ORAL RINSE 15 ML: 1.2 LIQUID ORAL at 06:03

## 2023-03-27 RX ADMIN — FENTANYL CITRATE 150 MCG: 50 INJECTION, SOLUTION INTRAMUSCULAR; INTRAVENOUS at 11:12

## 2023-03-27 ASSESSMENT — PAIN DESCRIPTION - LOCATION
LOCATION: BACK;RIB CAGE
LOCATION: BACK;RIB CAGE
LOCATION: RIB CAGE
LOCATION: INCISION
LOCATION: BACK;RIB CAGE
LOCATION: OTHER (COMMENT)

## 2023-03-27 ASSESSMENT — PAIN - FUNCTIONAL ASSESSMENT
PAIN_FUNCTIONAL_ASSESSMENT: ACTIVITIES ARE NOT PREVENTED
PAIN_FUNCTIONAL_ASSESSMENT: ACTIVITIES ARE NOT PREVENTED

## 2023-03-27 ASSESSMENT — PAIN SCALES - GENERAL
PAINLEVEL_OUTOF10: 3
PAINLEVEL_OUTOF10: 9
PAINLEVEL_OUTOF10: 8
PAINLEVEL_OUTOF10: 6
PAINLEVEL_OUTOF10: 7
PAINLEVEL_OUTOF10: 6
PAINLEVEL_OUTOF10: 9

## 2023-03-27 ASSESSMENT — PAIN DESCRIPTION - DESCRIPTORS
DESCRIPTORS: SHARP;ACHING;DISCOMFORT
DESCRIPTORS: ACHING
DESCRIPTORS: SHARP;ACHING;SORE
DESCRIPTORS: DISCOMFORT;ACHING

## 2023-03-27 ASSESSMENT — PAIN DESCRIPTION - ORIENTATION
ORIENTATION: RIGHT

## 2023-03-27 ASSESSMENT — PAIN DESCRIPTION - FREQUENCY
FREQUENCY: CONTINUOUS

## 2023-03-27 ASSESSMENT — PAIN DESCRIPTION - ONSET
ONSET: ON-GOING

## 2023-03-27 ASSESSMENT — PAIN DESCRIPTION - PAIN TYPE
TYPE: SURGICAL PAIN

## 2023-03-27 NOTE — OP NOTE
Operative Note      Patient: Melissa Fisher  YOB: 1965  MRN: [id number]    Date of Procedure: 3/27/2023    Pre-Op Diagnosis: Loculated pleural effusion, possible Empyema (HCC) [J86.9]    Post-Op Diagnosis: Same       Procedure(s):  BRONCHOSCOPY  RIGHT VATS (VIDEO ASSISTED THORACOSCOPIC SURGERY)  DRAINAGE OF LOCULATED PLEURAL EFFUSION  TOTAL LUNG DECORTICATION  INTERCOSTAL NERVE BLOCK, MULTIPLE    Surgeon(s):  Ariel Zamudio DO    Assistant:   First Assistant: Ponce Keenan APRN - CNP    Anesthesia: General    Estimated Blood Loss (mL): less than 941     Complications: None    Specimens:   ID Type Source Tests Collected by Time Destination   1 : PLEURAL FLUID Tissue Lung CULTURE, ANAEROBIC, CULTURE, FUNGUS, GRAM STAIN, CULTURE, SURGICAL, CULTURE WITH SMEAR, ACID FAST BACILLIUS Katya Anderson, DO 3/27/2023 1153    2 : PLEURAL PEEL Tissue Lung CULTURE, ANAEROBIC, CULTURE, FUNGUS, GRAM STAIN, CULTURE, SURGICAL, CULTURE WITH SMEAR, ACID FAST BACILLIUS Azar Persaud, DO 3/27/2023 1159        Implants:  * No implants in log *      Drains:   Chest Tube Right Midaxillary;Pleural 1 (Active)   Status Continuous Suction 03/27/23 1238   Suction -20 cm H2O 03/27/23 1238   Dressing Status New dressing applied;Clean, dry & intact 03/27/23 1238   Chest Tube Dressing Other (Comment) 03/27/23 1238       Findings: 51yo M with PMH DJD, HTN, depression, DM, HLD presented to ED 3/21/2023 with CC of back pain that extended into his right chest. During his work-up, he was found to have a right pleural effusion, concerning for empyema. CTS was consulted for recommendations. Intraoperatively, he was found to have a loculated pleural effusion, concerning for empyema. There was no yaneth pus. Total lung decortication was performed. The right lung inflated nicely at the conclusion of the procedure.      Detailed Description of Procedure:   After appropriate discussion of risks, benefits and alternatives, informed deflated and chest tubes were placed. He had one 28Fr straight chest tube placed through the 5mm port incision. It was directed posteriorly and towards the apex. The 28Fr angled chest tube was then placed more inferiorly and anteriorly through a separate stab incision. It was directed posteriorly and along the diaphragm. The chest tubes were secured in place. The lung was then allowed to inflate with gentle Valsalva. The lung inflated well. The Larry retractor was removed and the access incision was closed in multiple layers. It was dressed with skin glue. At the conclusion of the procedure, all needle, sponge and instrument counts were correct. The patient was awoken from anesthesia, extubated and transferred to the PACU in stable condition.       Electronically signed by Daniel Doty DO on 3/27/2023 at 1:10 PM

## 2023-03-27 NOTE — PROGRESS NOTES
unselect if not a suspected or confirmed emergency medical condition->Emergency Medical Condition (MA) What reading provider will be dictating this exam?->CRC FINDINGS: Mild gynecomastia. Small fat filled umbilical hernia. Fat filled right inguinal hernia. Small right pleural effusion with associated atelectasis or pneumonia in the right lung base. Small foci of gas in the right pleural space may represent infection. No pneumoperitoneum. Evaluation for neoplasm, infection, focal lesions, and trauma is limited without the use of IV contrast.  Suspect fatty infiltration of the liver. Gallbladder negative. Spleen mildly enlarged 14 cm craniocaudal.  Pancreas and adrenal glands negative. No hydronephrosis. Bowel negative for obstruction or inflammation. Large colonic stool burden. Normal appendix. Atherosclerotic nondilated aorta. No suspicious lymphadenopathy or significant ascites. Macro bladder Bones negative. Small right pleural effusion with atelectasis/pneumonia in the right lung base. Foci of gas in the right pleural space may relate to infection. The No acute abdominal findings. Constipation. XR CHEST (2 VW)    Result Date: 3/21/2023  EXAMINATION: TWO XRAY VIEWS OF THE CHEST 3/21/2023 8:13 am COMPARISON: 19 August 2022 HISTORY: ORDERING SYSTEM PROVIDED HISTORY: pain TECHNOLOGIST PROVIDED HISTORY: Reason for exam:->pain What reading provider will be dictating this exam?->CRC FINDINGS: Suboptimal inspiration. New right pleural effusion with adjacent atelectasis and or infiltrate. Crowding of bronchovascular markings at the left base. New right pleural effusion with adjacent atelectasis and or infiltrate. Suboptimal inspiration.      MRI THORACIC SPINE W WO CONTRAST    Result Date: 3/21/2023  EXAMINATION: MRI OF THE THORACIC SPINE WITHOUT AND WITH CONTRAST; MRI OF THE LUMBAR SPINE WITHOUT AND WITH CONTRAST  3/21/2023 1:40 pm TECHNIQUE: Multiplanar multisequence MRI of the thoracic spine was increased STIR signal.  There is edema within the paravertebral soft tissue along right aspect of T6, T7, T8, T9, and T10. No evidence of fracture or malalignment involving the thoracic spine or lumbar spine. There is minimal central disc herniation at T3/T4 resulting in minimal effacement of the ventral cord. There is also mild right neural foraminal narrowing at this level. Mild disc herniations at T10/T11 and T11/T12. No central canal stenosis at these levels. No fracture or malalignment involving the lumbar spine. Postsurgical changes are present with laminectomy at level of L5. No loculated fluid collections within the laminectomy bed. No evidence of epidural abscess or hematoma involving thoracic spine or lumbar spine. Mild circumferential central canal stenosis present at L4/L5 due to facet hypertrophy and circumferential disc bulge. Moderate bilateral neural foraminal narrowing also present at L4/L5. Moderate disc space narrowing at L5/S1. 1. Large bulky osteophytes are seen along the right aspect of T6/T7, T7/T8, T8/T9, and T9/T10. There is edema and enhancement associated with these osteophytes with surrounding soft tissue edema. Findings could suggest recent trauma or repetitive injury. Follow-up MRI recommended to exclude developing osteomyelitis. No evidence of fracture. 2. Additional edema present within right aspect of T9 vertebral body which also may be related to recent trauma or repetitive injury. No evidence of T9 fracture. 3. No significant central canal stenosis involving the thoracic spine. 4. Mild central canal stenosis at L4/L5 with moderate bilateral neural foraminal narrowing. 5. Note made of a right pleural effusion with adjacent opacities likely related to pneumonia. Note made of foci of gas within the pleural effusion of possible infectious etiology.        Assessment:  56 y.o. male, lifelong non-smoker, with past medical history type 2 diabetes, BARBIE diagnosed 13 years

## 2023-03-27 NOTE — ANESTHESIA PRE PROCEDURE
(If Applicable): No results found for: COVID19    CT 3/23/23:  Loculated moderate right pleural effusion with atelectatic changes of a   largely atelectatic right middle and right lower lobe. Scattered gas   densities in the right lower lobe pleural portion for example axial series   301, image [id number] could represent components of gas within the pleural space   correlate with recent instrumentation or could represent components of   developing necrosis this was identified on recent CT abdomen and pelvis   examination but progressed from that exam further concerning for infectious   or necrotic components.                 Anesthesia Evaluation  Patient summary reviewed no history of anesthetic complications:   Airway: Mallampati: III  TM distance: >3 FB   Neck ROM: full     Dental: normal exam         Pulmonary:Negative Pulmonary ROS                              Cardiovascular:    (+) hypertension:, hyperlipidemia      ECG reviewed               Beta Blocker:  Not on Beta Blocker         Neuro/Psych:   Negative Neuro/Psych ROS              GI/Hepatic/Renal:   (+) hiatal hernia,           Endo/Other:    (+) DiabetesType II DM, poorly controlled, using insulin, . ROS comment: obese Abdominal:             Vascular: negative vascular ROS. Other Findings:             Anesthesia Plan      general     ASA 3       Induction: intravenous. arterial line  MIPS: Postoperative opioids intended, Prophylactic antiemetics administered and Postoperative trial extubation. Anesthetic plan and risks discussed with patient. Plan discussed with attending.                     Stu Mendoza, CHINTAN - CRNA   3/27/2023

## 2023-03-27 NOTE — CARE COORDINATION
3/27, Patient in OR. ZENON spoke with Hema Quintero from [employer]. Hema Quintero spoke with [employer] on late Friday on IV antibiotics should patient need them for home. At this time [employer] not going to pay for the IV antibiotics-they do not feel this is work related. Cost for the 1st week would be $262.10 then patient should be able to meet his deductible. ZENON/EZRA to follow to see what patient will need upon discharge.       MARYURI Chaparro  [facility] Case Management  [phone]

## 2023-03-27 NOTE — PROGRESS NOTES
effusion and minimal atelectasis. Upper Abdomen: Visualized portions of the upper abdomen unremarkable. Soft Tissues/Bones: No acute osseous or soft tissue findings. No aggressive osseous lesion. Loculated moderate right pleural effusion with atelectatic changes of a largely atelectatic right middle and right lower lobe. Scattered gas densities in the right lower lobe pleural portion for example axial series 301, image [id number] could represent components of gas within the pleural space correlate with recent instrumentation or could represent components of developing necrosis this was identified on recent CT abdomen and pelvis examination but progressed from that exam further concerning for infectious or necrotic components. MRI THORACIC SPINE W WO CONTRAST    Result Date: 3/21/2023  EXAMINATION: MRI OF THE THORACIC SPINE WITHOUT AND WITH CONTRAST; MRI OF THE LUMBAR SPINE WITHOUT AND WITH CONTRAST  3/21/2023 1:40 pm TECHNIQUE: Multiplanar multisequence MRI of the thoracic spine was performed without and with the administration of intravenous contrast.; Multiplanar multisequence MRI of the lumbar spine was performed without and with the administration of intravenous contrast. COMPARISON: Correlation made with view of the lower thoracic spine and lumbar spine from CT abdomen and pelvis performed today. HISTORY: ORDERING SYSTEM PROVIDED HISTORY: pain, herniation t11-12 TECHNOLOGIST PROVIDED HISTORY: Reason for exam:->pain, herniation t11-12 What reading provider will be dictating this exam?->CRC FINDINGS: 20 mL MultiHance utilized Large bulky osteophytes are seen along right aspect of T6/T7, T7/T8, T8/T9, and T9/T10.   There is enhancement associated with the osteophytes at these levels with corresponding increased STIR signal.  There is also low level enhancement within right aspect of T9 vertebral body with corresponding hypointense signal on T1 weighted imaging and increased STIR signal.  There is edema within space narrowing at L5/S1. 1. Large bulky osteophytes are seen along the right aspect of T6/T7, T7/T8, T8/T9, and T9/T10. There is edema and enhancement associated with these osteophytes with surrounding soft tissue edema. Findings could suggest recent trauma or repetitive injury. Follow-up MRI recommended to exclude developing osteomyelitis. No evidence of fracture. 2. Additional edema present within right aspect of T9 vertebral body which also may be related to recent trauma or repetitive injury. No evidence of T9 fracture. 3. No significant central canal stenosis involving the thoracic spine. 4. Mild central canal stenosis at L4/L5 with moderate bilateral neural foraminal narrowing. 5. Note made of a right pleural effusion with adjacent opacities likely related to pneumonia. Note made of foci of gas within the pleural effusion of possible infectious etiology. Electronically signed by Aislinn Collado DO on 3/27/2023 at 4:30 PM  NOTE: This report was transcribed using voice recognition software. Every effort was made to ensure accuracy; however, inadvertent computerized transcription errors may be present.

## 2023-03-27 NOTE — PROGRESS NOTES
CTS PACU Progress Note:      Brief HPI: Awake, alert. No complaints. Denies CP, palpitations, SOB at rest, dizziness/lightheadedness. Vitals:    03/27/23 1317 03/27/23 1321 03/27/23 1330 03/27/23 1345   BP: (!) 157/49 (!) 157/49     Pulse: 90 89 84 86   Resp: 22 22 19 16   Temp: 97 °F (36.1 °C) 97 °F (36.1 °C)     TempSrc: Temporal      SpO2: 95% 95% 95% 94%   Weight:       Height:               Intake/Output Summary (Last 24 hours) at 3/27/2023 1406  Last data filed at 3/27/2023 1314  Gross per 24 hour   Intake 1080 ml   Output 825 ml   Net 255 ml       CURRENT CT output:   Pleural: 60mL     Recent Labs     03/25/23  0614 03/26/23  0632   WBC 9.9 11.1   HGB 12.6 13.2   HCT 39.7 40.4    233     Recent Labs     03/25/23  0614 03/26/23  0632   BUN 14 10   CREATININE 0.7 0.9           PE  Cardiac: RRR  Lungs: decreased bases  Chest incision with DSD C/D/I. Chest tubes x 2 present and secure. Abd: Soft, nontender, +BS  Ext: DC        A/P: Day of Surgery     Stable s/p R VATS, drainage of loculated pleural effusion, decortication  Immediate post-operative hgb pending - will review  Remaining labs pending will review  Maintaining NSR  VSS  CXR reviewed  CT drainage adequate for immediate post operative period--currently at 60mL. No airleak(s).          Dispo: continue transfer to PCCU      This patient's case and care plan was discussed with the attending surgeon

## 2023-03-27 NOTE — ANESTHESIA PROCEDURE NOTES
Arterial Line:    An arterial line was placed using surface landmarks, in the OR for the following indication(s): continuous blood pressure monitoring and blood sampling needed. A 20 gauge (size), 1 and 3/8 inch (length), Arrow (type) catheter was placed, Seldinger technique not used, into the left radial artery, secured by tape and Tegaderm. Anesthesia type: Local  Local infiltration: Injection    Events:  patient tolerated procedure well with no complications.   Resident/CRNA: CHINTAN Anderson - CRNA  Performed: Resident/CRNA   Preanesthetic Checklist  Completed: patient identified, IV checked, site marked, risks and benefits discussed, surgical/procedural consents, equipment checked, pre-op evaluation, timeout performed, anesthesia consent given, oxygen available and monitors applied/VS acknowledged

## 2023-03-27 NOTE — PROGRESS NOTES
Patient's belonging taken to room 6521A, set in room and informed unit clerk Theda Goodpasture that they are in the room awaiting for patient's arrival

## 2023-03-27 NOTE — BRIEF OP NOTE
Brief Postoperative Note    Michele Araujo  YOB: 1965  [id number]    Pre-operative Diagnosis: loculated pleural effusion    Post-operative Diagnosis: Same, likely empyema    Operation: R VATS, drainage of loculated pleural effusion, decortication    Anesthesia: General    Surgeon: Patra Shone    Assistants: Cuca Hagan    Estimated Blood Loss: 770     Complications: none apparent    Implants: none

## 2023-03-27 NOTE — PROGRESS NOTES
[facility] PROGRESS NOTE      Chief complaint: Follow-up of Staphylococcus aureus bacteremia    The patient is a 56 y.o. male with history of DM, L4-S1 laminectomy in 08/2022, presented on 03/21 with back pain for a week after he was using a sledge hammer at work. On admission, he was afebrile and hemodynamically stable with leukocytosis of 22,000. Chest x-ray showed new right pleural effusion with adjacent atelectasis and/or infiltrate. CT abdomen and pelvis showed small right pleural effusion with infiltrate in the right lung base, no acute abdominal findings. MRI thoracolumbar spine with and without contrast showed large bulky osteophytes along the right aspect of T6/T7, T7/T8, T8/T9, T9/T10 with edema and enhancement suggestive of recent trauma or repetitive injury, mild central canal stenosis at L4/L5 with moderate bilateral neural foraminal narrowing. Blood cultures showed MSSA. He received a dose of ceftriaxone on admission. Azithromycin and vancomycin were started on admission then switched to cefazolin on 03/23. CT chest showed loculated moderate right pleural effusion with atelectatic changes of largely atelectatic right middle and lower lobe with scattered gas densities in the right lower lobe pleural portion. Transthoracic echocardiogram showed no evidence of endocarditis. He underwent right VATS with total lung decortication and drainage of loculated pleural effusion on 03/27 during which copious gelatinous material within the right chest cavity was noted and approximately 1000 mL of fluid was removed. Subjective: Patient was seen and examined. No chills, no abdominal pain, no diarrhea, no rash, no itching, has unchanged right subcostal/pleuritic chest and middle back pain. Afebrile.       Objective:  /75   Pulse 93   Temp 98.2 °F (36.8 °C)   Resp 16   Ht 6' 3\" (1.905 m)   Wt 240 lb (108.9 kg)   SpO2 92%   BMI 30.00 kg/m²   Constitutional: Alert, not in distress  Respiratory: Clear breath sounds, no crackles, no wheezes  Cardiovascular: Regular rate and rhythm, no murmurs  Gastrointestinal: Bowel sounds present, soft, nontender  Skin: Warm and dry, no active dermatoses  Musculoskeletal: No joint swelling, no joint erythema    Labs, imaging, and medical records/notes were personally reviewed. Assessment:  Sepsis, resolved, secondary to MSSA bacteremia, etiology unclear, suspect associated with loculated effusion, s/p right VATS with total lung decortication and drainage of loculated pleural effusion on 03/27. Transthoracic echocardiogram showed no evidence of endocarditis. Recommendations:  Continue cefazolin 2 g every 8 hours. Follow-up blood cultures. Follow up tissue cultures. JOE was recommended but patient deferred for now. Continue supportive care. Thank you for involving me in the care of Merary Ross. I will continue to follow. Please do not hesitate to call for any questions or concerns.       Electronically signed by Deb Martinez MD on 3/27/2023 at 8:33 AM

## 2023-03-27 NOTE — PROGRESS NOTES
Pharmacist Review and Automatic Dose Adjustment of Prophylactic Enoxaparin    The reviewing pharmacist has made an adjustment to the ordered enoxaparin dose or converted to UFH per the approved [facility] protocol and table as identified below. Mingo Umanzor is a 56 y.o. male. Recent Labs     03/25/23  0614 03/26/23  0632 03/27/23  1327   CREATININE 0.7 0.9 0.9       Estimated Creatinine Clearance: 124 mL/min (based on SCr of 0.9 mg/dL).     Recent Labs     03/26/23  0632 03/27/23  1327   HGB 13.2 12.6   HCT 40.4 38.7    353     Recent Labs     03/26/23  0632   INR 1.3       Height:   Ht Readings from Last 1 Encounters:   03/21/23 6' 3\" (1.905 m)     Weight:  Wt Readings from Last 1 Encounters:   03/21/23 240 lb (108.9 kg)               Plan: Based upon the patient's weight and renal function    Ordered: Enoxaparin 40mg SUBQ Daily    Changed/converted to    New Order: Enoxaparin 30mg SUBQ BID      Thank you,  Omar Wright, [facility]  3/27/2023, 5:56 PM

## 2023-03-28 ENCOUNTER — APPOINTMENT (OUTPATIENT)
Dept: GENERAL RADIOLOGY | Age: 58
DRG: 853 | End: 2023-03-28
Payer: OTHER MISCELLANEOUS

## 2023-03-28 PROBLEM — J18.9 PNEUMONIA DUE TO INFECTIOUS ORGANISM: Status: ACTIVE | Noted: 2023-03-28

## 2023-03-28 LAB
ANION GAP SERPL CALCULATED.3IONS-SCNC: 10 MMOL/L (ref 7–16)
BUN SERPL-MCNC: 19 MG/DL (ref 6–20)
CALCIUM SERPL-MCNC: 8.5 MG/DL (ref 8.6–10.2)
CHLORIDE SERPL-SCNC: 99 MMOL/L (ref 98–107)
CO2 SERPL-SCNC: 29 MMOL/L (ref 22–29)
CREAT SERPL-MCNC: 0.9 MG/DL (ref 0.7–1.2)
ERYTHROCYTE [DISTWIDTH] IN BLOOD BY AUTOMATED COUNT: 11.9 FL (ref 11.5–15)
GLUCOSE SERPL-MCNC: 310 MG/DL (ref 74–99)
GRAM STAIN ORDERABLE: NORMAL
GRAM STAIN ORDERABLE: NORMAL
HBA1C MFR BLD: 10.3 % (ref 4–5.6)
HCT VFR BLD AUTO: 42.4 % (ref 37–54)
HGB BLD-MCNC: 13.1 G/DL (ref 12.5–16.5)
MCH RBC QN AUTO: 28.1 PG (ref 26–35)
MCHC RBC AUTO-ENTMCNC: 30.9 % (ref 32–34.5)
MCV RBC AUTO: 91 FL (ref 80–99.9)
METER GLUCOSE: 273 MG/DL (ref 74–99)
METER GLUCOSE: 381 MG/DL (ref 74–99)
METER GLUCOSE: 409 MG/DL (ref 74–99)
METER GLUCOSE: 423 MG/DL (ref 74–99)
METER GLUCOSE: 432 MG/DL (ref 74–99)
PLATELET # BLD AUTO: 298 E9/L (ref 130–450)
PMV BLD AUTO: 9.3 FL (ref 7–12)
POTASSIUM SERPL-SCNC: 4.5 MMOL/L (ref 3.5–5)
RBC # BLD AUTO: 4.66 E12/L (ref 3.8–5.8)
SODIUM SERPL-SCNC: 138 MMOL/L (ref 132–146)
WBC # BLD: 13.5 E9/L (ref 4.5–11.5)

## 2023-03-28 PROCEDURE — 6360000002 HC RX W HCPCS

## 2023-03-28 PROCEDURE — 85027 COMPLETE CBC AUTOMATED: CPT

## 2023-03-28 PROCEDURE — 6370000000 HC RX 637 (ALT 250 FOR IP)

## 2023-03-28 PROCEDURE — 80048 BASIC METABOLIC PNL TOTAL CA: CPT

## 2023-03-28 PROCEDURE — 94640 AIRWAY INHALATION TREATMENT: CPT

## 2023-03-28 PROCEDURE — S5553 INSULIN LONG ACTING 5 U: HCPCS | Performed by: INTERNAL MEDICINE

## 2023-03-28 PROCEDURE — 36415 COLL VENOUS BLD VENIPUNCTURE: CPT

## 2023-03-28 PROCEDURE — 83036 HEMOGLOBIN GLYCOSYLATED A1C: CPT

## 2023-03-28 PROCEDURE — S5553 INSULIN LONG ACTING 5 U: HCPCS

## 2023-03-28 PROCEDURE — 2580000003 HC RX 258

## 2023-03-28 PROCEDURE — 6370000000 HC RX 637 (ALT 250 FOR IP): Performed by: INTERNAL MEDICINE

## 2023-03-28 PROCEDURE — 71045 X-RAY EXAM CHEST 1 VIEW: CPT

## 2023-03-28 PROCEDURE — 82962 GLUCOSE BLOOD TEST: CPT

## 2023-03-28 PROCEDURE — 97535 SELF CARE MNGMENT TRAINING: CPT

## 2023-03-28 PROCEDURE — 2700000000 HC OXYGEN THERAPY PER DAY

## 2023-03-28 PROCEDURE — 2140000000 HC CCU INTERMEDIATE R&B

## 2023-03-28 RX ORDER — HEPARIN SODIUM (PORCINE) LOCK FLUSH IV SOLN 100 UNIT/ML 100 UNIT/ML
1 SOLUTION INTRAVENOUS EVERY 12 HOURS SCHEDULED
Status: DISCONTINUED | OUTPATIENT
Start: 2023-03-28 | End: 2023-03-30 | Stop reason: HOSPADM

## 2023-03-28 RX ORDER — SODIUM CHLORIDE 0.9 % (FLUSH) 0.9 %
5-40 SYRINGE (ML) INJECTION EVERY 12 HOURS SCHEDULED
Status: DISCONTINUED | OUTPATIENT
Start: 2023-03-28 | End: 2023-03-30 | Stop reason: HOSPADM

## 2023-03-28 RX ORDER — INSULIN GLARGINE-YFGN 100 [IU]/ML
50 INJECTION, SOLUTION SUBCUTANEOUS 2 TIMES DAILY
Status: DISCONTINUED | OUTPATIENT
Start: 2023-03-28 | End: 2023-03-30 | Stop reason: HOSPADM

## 2023-03-28 RX ORDER — OXYCODONE HYDROCHLORIDE 10 MG/1
10 TABLET ORAL EVERY 4 HOURS PRN
Status: DISCONTINUED | OUTPATIENT
Start: 2023-03-28 | End: 2023-03-30 | Stop reason: HOSPADM

## 2023-03-28 RX ORDER — HEPARIN SODIUM (PORCINE) LOCK FLUSH IV SOLN 100 UNIT/ML 100 UNIT/ML
1 SOLUTION INTRAVENOUS PRN
Status: DISCONTINUED | OUTPATIENT
Start: 2023-03-28 | End: 2023-03-30 | Stop reason: HOSPADM

## 2023-03-28 RX ORDER — OXYCODONE HYDROCHLORIDE 5 MG/1
5 TABLET ORAL EVERY 4 HOURS PRN
Status: DISCONTINUED | OUTPATIENT
Start: 2023-03-28 | End: 2023-03-30 | Stop reason: HOSPADM

## 2023-03-28 RX ORDER — KETOROLAC TROMETHAMINE 30 MG/ML
15 INJECTION, SOLUTION INTRAMUSCULAR; INTRAVENOUS EVERY 6 HOURS PRN
Status: DISCONTINUED | OUTPATIENT
Start: 2023-03-28 | End: 2023-03-30 | Stop reason: HOSPADM

## 2023-03-28 RX ORDER — SODIUM CHLORIDE 0.9 % (FLUSH) 0.9 %
5-40 SYRINGE (ML) INJECTION PRN
Status: DISCONTINUED | OUTPATIENT
Start: 2023-03-28 | End: 2023-03-30 | Stop reason: HOSPADM

## 2023-03-28 RX ORDER — SODIUM CHLORIDE 9 MG/ML
INJECTION, SOLUTION INTRAVENOUS PRN
Status: DISCONTINUED | OUTPATIENT
Start: 2023-03-28 | End: 2023-03-30 | Stop reason: HOSPADM

## 2023-03-28 RX ADMIN — AMLODIPINE BESYLATE 5 MG: 5 TABLET ORAL at 09:10

## 2023-03-28 RX ADMIN — INSULIN LISPRO 4 UNITS: 100 INJECTION, SOLUTION INTRAVENOUS; SUBCUTANEOUS at 21:43

## 2023-03-28 RX ADMIN — BENZONATATE 100 MG: 100 CAPSULE ORAL at 21:45

## 2023-03-28 RX ADMIN — SERTRALINE 100 MG: 100 TABLET, FILM COATED ORAL at 09:12

## 2023-03-28 RX ADMIN — CEFAZOLIN 2000 MG: 2 INJECTION, POWDER, FOR SOLUTION INTRAMUSCULAR; INTRAVENOUS at 00:28

## 2023-03-28 RX ADMIN — BENZONATATE 100 MG: 100 CAPSULE ORAL at 14:20

## 2023-03-28 RX ADMIN — INSULIN GLARGINE-YFGN 45 UNITS: 100 INJECTION, SOLUTION SUBCUTANEOUS at 09:15

## 2023-03-28 RX ADMIN — IPRATROPIUM BROMIDE AND ALBUTEROL SULFATE 1 AMPULE: 2.5; .5 SOLUTION RESPIRATORY (INHALATION) at 09:14

## 2023-03-28 RX ADMIN — KETOROLAC TROMETHAMINE 15 MG: 30 INJECTION, SOLUTION INTRAMUSCULAR; INTRAVENOUS at 13:13

## 2023-03-28 RX ADMIN — GUAIFENESIN 400 MG: 400 TABLET ORAL at 21:43

## 2023-03-28 RX ADMIN — Medication 10 ML: at 09:18

## 2023-03-28 RX ADMIN — INSULIN LISPRO 8 UNITS: 100 INJECTION, SOLUTION INTRAVENOUS; SUBCUTANEOUS at 09:16

## 2023-03-28 RX ADMIN — Medication 5 MG: at 21:45

## 2023-03-28 RX ADMIN — INSULIN LISPRO 15 UNITS: 100 INJECTION, SOLUTION INTRAVENOUS; SUBCUTANEOUS at 12:02

## 2023-03-28 RX ADMIN — MORPHINE SULFATE 2 MG: 2 INJECTION, SOLUTION INTRAMUSCULAR; INTRAVENOUS at 01:46

## 2023-03-28 RX ADMIN — ACETAMINOPHEN 325MG 650 MG: 325 TABLET ORAL at 06:21

## 2023-03-28 RX ADMIN — IPRATROPIUM BROMIDE AND ALBUTEROL SULFATE 1 AMPULE: 2.5; .5 SOLUTION RESPIRATORY (INHALATION) at 16:12

## 2023-03-28 RX ADMIN — SENNOSIDES AND DOCUSATE SODIUM 2 TABLET: 8.6; 5 TABLET ORAL at 17:03

## 2023-03-28 RX ADMIN — ACETAMINOPHEN 325MG 650 MG: 325 TABLET ORAL at 12:01

## 2023-03-28 RX ADMIN — Medication 10 ML: at 09:19

## 2023-03-28 RX ADMIN — POLYETHYLENE GLYCOL 3350 17 G: 17 POWDER, FOR SOLUTION ORAL at 09:15

## 2023-03-28 RX ADMIN — MORPHINE SULFATE 2 MG: 2 INJECTION, SOLUTION INTRAMUSCULAR; INTRAVENOUS at 10:33

## 2023-03-28 RX ADMIN — GUAIFENESIN 400 MG: 400 TABLET ORAL at 14:20

## 2023-03-28 RX ADMIN — MORPHINE SULFATE 2 MG: 2 INJECTION, SOLUTION INTRAMUSCULAR; INTRAVENOUS at 05:36

## 2023-03-28 RX ADMIN — GUAIFENESIN 400 MG: 400 TABLET ORAL at 09:11

## 2023-03-28 RX ADMIN — INSULIN GLARGINE-YFGN 50 UNITS: 100 INJECTION, SOLUTION SUBCUTANEOUS at 21:44

## 2023-03-28 RX ADMIN — INSULIN LISPRO 15 UNITS: 100 INJECTION, SOLUTION INTRAVENOUS; SUBCUTANEOUS at 17:02

## 2023-03-28 RX ADMIN — BENZONATATE 100 MG: 100 CAPSULE ORAL at 09:11

## 2023-03-28 RX ADMIN — IPRATROPIUM BROMIDE AND ALBUTEROL SULFATE 1 AMPULE: 2.5; .5 SOLUTION RESPIRATORY (INHALATION) at 20:31

## 2023-03-28 RX ADMIN — OXYCODONE HYDROCHLORIDE 5 MG: 5 TABLET ORAL at 03:19

## 2023-03-28 RX ADMIN — POLYETHYLENE GLYCOL 3350 17 G: 17 POWDER, FOR SOLUTION ORAL at 21:44

## 2023-03-28 RX ADMIN — IPRATROPIUM BROMIDE AND ALBUTEROL SULFATE 1 AMPULE: 2.5; .5 SOLUTION RESPIRATORY (INHALATION) at 12:43

## 2023-03-28 RX ADMIN — Medication 10 ML: at 22:38

## 2023-03-28 RX ADMIN — CEFAZOLIN 2000 MG: 2 INJECTION, POWDER, FOR SOLUTION INTRAMUSCULAR; INTRAVENOUS at 09:17

## 2023-03-28 RX ADMIN — CEFAZOLIN 2000 MG: 2 INJECTION, POWDER, FOR SOLUTION INTRAMUSCULAR; INTRAVENOUS at 17:04

## 2023-03-28 RX ADMIN — OXYCODONE HYDROCHLORIDE 10 MG: 10 TABLET ORAL at 13:13

## 2023-03-28 RX ADMIN — INSULIN LISPRO 16 UNITS: 100 INJECTION, SOLUTION INTRAVENOUS; SUBCUTANEOUS at 17:02

## 2023-03-28 RX ADMIN — ACETAMINOPHEN 325MG 650 MG: 325 TABLET ORAL at 00:28

## 2023-03-28 RX ADMIN — PANTOPRAZOLE SODIUM 40 MG: 40 TABLET, DELAYED RELEASE ORAL at 06:22

## 2023-03-28 RX ADMIN — ROSUVASTATIN CALCIUM 20 MG: 20 TABLET, FILM COATED ORAL at 21:43

## 2023-03-28 RX ADMIN — INSULIN LISPRO 16 UNITS: 100 INJECTION, SOLUTION INTRAVENOUS; SUBCUTANEOUS at 12:01

## 2023-03-28 RX ADMIN — INSULIN LISPRO 15 UNITS: 100 INJECTION, SOLUTION INTRAVENOUS; SUBCUTANEOUS at 09:16

## 2023-03-28 RX ADMIN — ACETAMINOPHEN 325MG 650 MG: 325 TABLET ORAL at 17:52

## 2023-03-28 ASSESSMENT — PAIN DESCRIPTION - ORIENTATION
ORIENTATION: RIGHT

## 2023-03-28 ASSESSMENT — PAIN SCALES - GENERAL
PAINLEVEL_OUTOF10: 6
PAINLEVEL_OUTOF10: 9
PAINLEVEL_OUTOF10: 0
PAINLEVEL_OUTOF10: 10
PAINLEVEL_OUTOF10: 9
PAINLEVEL_OUTOF10: 5
PAINLEVEL_OUTOF10: 10

## 2023-03-28 ASSESSMENT — PAIN SCALES - WONG BAKER
WONGBAKER_NUMERICALRESPONSE: 0
WONGBAKER_NUMERICALRESPONSE: 0
WONGBAKER_NUMERICALRESPONSE: NO HURT
WONGBAKER_NUMERICALRESPONSE: 0
WONGBAKER_NUMERICALRESPONSE: NO HURT
WONGBAKER_NUMERICALRESPONSE: NO HURT

## 2023-03-28 ASSESSMENT — PAIN DESCRIPTION - LOCATION
LOCATION: RIB CAGE
LOCATION: CHEST
LOCATION: CHEST
LOCATION: RIB CAGE
LOCATION: RIB CAGE
LOCATION: RIB CAGE;INCISION

## 2023-03-28 ASSESSMENT — PAIN DESCRIPTION - DESCRIPTORS
DESCRIPTORS: ACHING;SHARP;SORE
DESCRIPTORS: ACHING;DULL
DESCRIPTORS: ACHING;SHARP;SORE
DESCRIPTORS: ACHING;SORE;SHARP
DESCRIPTORS: ACHING;DISCOMFORT;DULL
DESCRIPTORS: SHARP;SHOOTING;ACHING;DISCOMFORT

## 2023-03-28 ASSESSMENT — PAIN - FUNCTIONAL ASSESSMENT
PAIN_FUNCTIONAL_ASSESSMENT: PREVENTS OR INTERFERES SOME ACTIVE ACTIVITIES AND ADLS
PAIN_FUNCTIONAL_ASSESSMENT: ACTIVITIES ARE NOT PREVENTED
PAIN_FUNCTIONAL_ASSESSMENT: PREVENTS OR INTERFERES SOME ACTIVE ACTIVITIES AND ADLS
PAIN_FUNCTIONAL_ASSESSMENT: PREVENTS OR INTERFERES SOME ACTIVE ACTIVITIES AND ADLS
PAIN_FUNCTIONAL_ASSESSMENT: ACTIVITIES ARE NOT PREVENTED

## 2023-03-28 NOTE — PROGRESS NOTES
Comprehensive Nutrition Assessment    Type and Reason for Visit:  Initial (LOS)    Nutrition Recommendations/Plan:   Recommend and start Glucerna supplement daily and Isaias wound healing supplement BID to help meet increased nutritional needs from surgical wound healing. Malnutrition Assessment:  Malnutrition Status: At risk for malnutrition (Comment) (03/28/23 1819)    Context:  Acute Illness     Findings of the 6 clinical characteristics of malnutrition:  Energy Intake:  75% or less of estimated energy requirements for 7 or more days  Weight Loss:  Unable to assess (d/t no actual weights available since admission)     Body Fat Loss:  Unable to assess     Muscle Mass Loss:  Unable to assess    Fluid Accumulation:  No significant fluid accumulation     Strength:  Not Performed    Nutrition Assessment:    Patient adm w/ back pain associated with peripheral numbness ; s/p laminectomy on 8/26/22 ; noted pleural effusion and empyema ; s/p VATS on 3/27 ; noted MSSA bacteremia and septicemia ; hx of DM ; noted PNA ; will start ONS    Nutrition Related Findings:    I&Os WNL, A&O x 4, chest tubes x 2, active BS, constipation, trace edema ; Wound Type: Surgical Incision (Incision x 1)       Current Nutrition Intake & Therapies:    Average Meal Intake: 51-75%     ADULT DIET; Regular    Anthropometric Measures:  Height: 6' 3\" (190.5 cm)  Ideal Body Weight (IBW): 196 lbs (89 kg)       Current Body Weight: 240 lb (108.9 kg) (3/21, stated), 122.4 % IBW. Weight Source: Stated  Current BMI (kg/m2): 30  Usual Body Weight: 242 lb (109.8 kg) (8/19/22, actual)  % Weight Change (Calculated): -0.8                    BMI Categories: Obese Class 1 (BMI 30.0-34. 9)    Estimated Daily Nutrient Needs:  Energy Requirements Based On: Formula  Weight Used for Energy Requirements: Current  Energy (kcal/day): 3231-2534 (REE 2012 x 1.2 SF)  Weight Used for Protein Requirements: Ideal  Protein (g/day): 115-135 (1.3-1.5g/kg IBW)  Method Used for Fluid Requirements: 1 ml/kcal  Fluid (ml/day): 2115-1602    Nutrition Diagnosis:   Increased nutrient needs related to increase demand for energy/nutrients as evidenced by wounds (surgical)    Nutrition Interventions:   Food and/or Nutrient Delivery: Continue Current Diet, Start Oral Nutrition Supplement  Nutrition Education/Counseling: Education not indicated  Coordination of Nutrition Care: Continue to monitor while inpatient       Goals:  Previous Goal Met: Progressing toward Goal(s)  Goals: PO intake 75% or greater, by next RD assessment       Nutrition Monitoring and Evaluation:   Behavioral-Environmental Outcomes: None Identified  Food/Nutrient Intake Outcomes: Food and Nutrient Intake, Supplement Intake  Physical Signs/Symptoms Outcomes: Biochemical Data, Chewing or Swallowing, GI Status, Fluid Status or Edema, Hemodynamic Status, Meal Time Behavior, Constipation, Nutrition Focused Physical Findings, Skin, Weight    Discharge Planning:     Too soon to determine     Tavo Kelsey RD, LD  Contact: [phone]

## 2023-03-28 NOTE — DISCHARGE INSTRUCTIONS
- BUN Creatinine and liver panel    - Complete Blood Count with differential (CBC with dif)  4. Once weekly:    - C-Reactive Protein (not high sensitivity CRP)    - Erythrocyte/Westergren Sedimentation Rate (WSR or ESR)  5. When clinically indicated obtain:    - Urine culture - if the patient has a fever with purulent drainage from Mendoza or suprapubic catheter, or foul smelling urine. Do not irrigate a clogged Mendoza catheter. Replace it. - Blood cultures and Wound Gram stain with culture & sensitivity - if the patient has a fever or increasing drainage or foul odor from a wound. Notify the treating physician in a timely manner    - Stool specimen - if diarrhea occurs while on antibiotics, send stools for C. difficile and WBCs. 6.  When a drug is discontinued due to a low white blood cell count (WBCs) draw CBC with differential and CMP twice a week x 2 measurements. NOTIFICATION AND FOLLOW UP WITH INFECTIOUS DISEASE PHYSICIAN:  1. Notify ordering physician or office if patient requires admission to the hospital with reason for admission. 2.  Discontinue all blood work upon completion of IV antibiotics, unless otherwise specified by ordering physician. 3.  Notify ordering physician if the patient does not receive the scheduled antibiotic for 24 hours or more. 5.  Ensure patient has an appointment to follow up with the Infectious Disease physician within 2 weeks of discharge from the hospital or initiation of IV antibiotic therapy. 6.  Continue IV antibiotic therapy until patient is seen in the office or unless specific stop date is noted on the original order or unless otherwise ordered by physician. Contact the Infectious Disease physicians office to ensure stop date is correct before stopping antibiotics.     Discharge Instructions for Thoracic Surgery    What you will need at home:                          *  Digital Thermometer               *  Antibacterial Soap                *  Clean Wash spray. Avoid hot water, comfortably warm is OK. ? If you went home with a dressing on any incision: Remove the dressing daily     and wash the incision with antibacterial soap and water and pat dry. Only     cover the incision with a dressing if it is draining. Otherwise leave it     uncovered (unless your doctor told you otherwise)  ? No tub baths until your incisions are healed. ? DO NOT APPLY ANYTHING OTHER THAN ANTIBACTERIAL SOAP AND     WATER TO YOUR INCISIONS. Do not apply lotions, creams, ointments,     hydrogen peroxide or powder. ? Keep your incisions CLEAN. Think CLEAN. No one should touch your     incisions without washing their hands first.  Do not let pets touch your incisions     (have incisions covered with clothing when around pets). ? Wear loose clothing. For support women should wear a bra. VANESSA Hose (white stockings): Should be worn during the day and off at night. Someone other than the patient will need to put on and take off the hose. It is too much pulling and tugging for the patient. Expect them to be difficult to put on and they should feel snug. These are usually worn for 2-4 weeks. Wash them by hand to keep their elasticity. Diet:  Eat a low fat, low salt diet. Eat a high fiber diet to avoid constipation and straining during bowel movements (whole grains, raw veggies, fruits)    Diabetics:  Check blood sugars twice a day. Contact your primary care physician if your blood sugar is consistently above 130. Good tissue healing occurs when blood sugars are less than 130. Sexual Activity: When you can climb 2 flights of stairs without chest pain, shortness of breath or fatigue, it is generally OK to resume sexual activity. Depression: It is not unusual to have feelings of anxiety, fear or depression after surgery. If you need help with these feelings, call your primary care physician.   There are medications to help and healing usually occurs sooner is

## 2023-03-28 NOTE — PLAN OF CARE
Problem: Chronic Conditions and Co-morbidities  Goal: Patient's chronic conditions and co-morbidity symptoms are monitored and maintained or improved  3/28/2023 0430 by Samantha Singh RN  Outcome: Progressing  3/27/2023 1856 by Juanito Felix RN  Outcome: Progressing     Problem: Safety - Adult  Goal: Free from fall injury  3/28/2023 0430 by Samantha Singh RN  Outcome: Progressing  3/27/2023 1856 by Juanito Felix RN  Outcome: Progressing     Problem: Discharge Planning  Goal: Discharge to home or other facility with appropriate resources  3/28/2023 0430 by Samantha Singh RN  Outcome: Progressing  3/27/2023 1856 by Juanito Felix RN  Outcome: Progressing

## 2023-03-28 NOTE — ANESTHESIA POSTPROCEDURE EVALUATION
Department of Anesthesiology  Postprocedure Note    Patient: John Llanes  MRN: [id number]  YOB: 1965  Date of evaluation: 3/28/2023      Procedure Summary     Date: 03/27/23 Room / Location: [place] [address] / [facility]    Anesthesia Start: 2384 Anesthesia Stop: 7667    Procedure: BRONCHOSCOPY, RIGHT VIDEO ASSISTED THORACOSCOPY, DRAINAGE OF PLEURAL EFFUSION/EMPYEMA, DECORTICATION (Right) Diagnosis:       Empyema ([address]      (Empyema ([address] [J86.9])    Surgeons: Kaia Hensley DO Responsible Provider: Ginny Squires MD    Anesthesia Type: general ASA Status: 3          Anesthesia Type: No value filed.     Valentin Phase I: Valentin Score: 9    Valentin Phase II:        Anesthesia Post Evaluation    Patient location during evaluation: PACU  Patient participation: complete - patient participated  Level of consciousness: awake and alert  Airway patency: patent  Nausea & Vomiting: no nausea and no vomiting  Complications: no  Cardiovascular status: blood pressure returned to baseline and hemodynamically stable  Respiratory status: acceptable and spontaneous ventilation  Hydration status: euvolemic  Multimodal analgesia pain management approach

## 2023-03-28 NOTE — PROGRESS NOTES
Messaged Delfina about pt BS being 430 and questioned if there were any new orders needed. Ce Hoffman, RN      No new orders at this time

## 2023-03-28 NOTE — PROGRESS NOTES
Hospitalist Progress Note      Admit date:  3/21/2023      Synopsis :  53yo M with PMH DJD, HTN, depression, DM, HLD presented to ED 3/21/2023 with CC of back pain that extended into his right chest. During his work-up, he was found to  have positive blood cultures for MSSA. ID was consulted. He underwent CT chest which was concerning for empyema. Infectious disease was consulted cardiothoracic surgery consulted. Patient is currently on IV antibiotic. Underwent VATs on 3/27/2023. Assessment and plan:    Impression  Sepsis secondary to MSSA bacteremia unclear etiology, 3/23 Echo no evidence of endocarditis   Right-sided empyema, pneumonia  Diabetes mellitus hemoglobin, poorly controlled, A1c 10.1%  Hypertension  Gastroesophageal reflux disease  Anxiety and depression  Vitamin D deficiency    Plan  Right-sided VATS drainage of pleural effusion empyema and decortication yesterday-chest tube still in place. Continue antibiotic per infectious disease recommendation  Lantus was increased to 45 units BID but blood sugars still in the 400s. Will increase to 50 units BID. Continue high dose SSI and place on carb controlled diet. Continue supportive care  Infection disease following-Remains on ancef 2 g every 8 hours-anticipated to need from 3/27-4/24 as per ID. Post operative hemoglobin pending. Tolerated procedure well. Disposition: Chest tube s/p VATS and final Abx reccs pending. DVT prophylaxis: Subcutaneous heparin  CODE STATUS: Patient is a full code      Subjective:     Feels well. Minimal discomfort-controlled with pain medication. No chest discomfort or shortness of breath. No nausea or vomiting. No fevers or chills.      Objective:    Physical examination:  VS: BP (!) 155/83   Pulse 88   Temp 98 °F (36.7 °C) (Temporal)   Resp 18   Ht 6' 3\" (1.905 m)   Wt 240 lb (108.9 kg)   SpO2 93%   BMI 30.00 kg/m²   I/O:   Intake/Output Summary (Last LUMBAR SPINE WITHOUT AND WITH CONTRAST  3/21/2023 1:40 pm TECHNIQUE: Multiplanar multisequence MRI of the thoracic spine was performed without and with the administration of intravenous contrast.; Multiplanar multisequence MRI of the lumbar spine was performed without and with the administration of intravenous contrast. COMPARISON: Correlation made with view of the lower thoracic spine and lumbar spine from CT abdomen and pelvis performed today. HISTORY: ORDERING SYSTEM PROVIDED HISTORY: pain, herniation t11-12 TECHNOLOGIST PROVIDED HISTORY: Reason for exam:->pain, herniation t11-12 What reading provider will be dictating this exam?->CRC FINDINGS: 20 mL MultiHance utilized Large bulky osteophytes are seen along right aspect of T6/T7, T7/T8, T8/T9, and T9/T10. There is enhancement associated with the osteophytes at these levels with corresponding increased STIR signal.  There is also low level enhancement within right aspect of T9 vertebral body with corresponding hypointense signal on T1 weighted imaging and increased STIR signal.  There is edema within the paravertebral soft tissue along right aspect of T6, T7, T8, T9, and T10. No evidence of fracture or malalignment involving the thoracic spine or lumbar spine. There is minimal central disc herniation at T3/T4 resulting in minimal effacement of the ventral cord. There is also mild right neural foraminal narrowing at this level. Mild disc herniations at T10/T11 and T11/T12. No central canal stenosis at these levels. No fracture or malalignment involving the lumbar spine. Postsurgical changes are present with laminectomy at level of L5. No loculated fluid collections within the laminectomy bed. No evidence of epidural abscess or hematoma involving thoracic spine or lumbar spine. Mild circumferential central canal stenosis present at L4/L5 due to facet hypertrophy and circumferential disc bulge.   Moderate bilateral neural foraminal narrowing also present at

## 2023-03-28 NOTE — PATIENT CARE CONFERENCE
[facility] Quality Flow/Interdisciplinary Rounds Progress Note        Quality Flow Rounds held on March 28, 2023    Disciplines Attending:  Bedside Nurse, , , and Nursing Unit Leadership    Karen Simmons was admitted on 3/21/2023 10:37 AM    Anticipated Discharge Date:       Disposition:    Nikita Score:  Nikita Scale Score: 22    Readmission Risk              Risk of Unplanned Readmission:  12           Discussed patient goal for the day, patient clinical progression, and barriers to discharge.   The following Goal(s) of the Day/Commitment(s) have been identified:  Diagnostics - Report Results and possibly get 1 chest tube removed      Layne Contreras RN  March 28, 2023

## 2023-03-28 NOTE — CARE COORDINATION
Care Coordination  The patient is post op day #1 right vats procedure and has 2 chest tubes in plan for possible removal of one of them today. The patient is on room air currently. Lu Becker from [address] [place] [address] is following for home Iv atb as well as [address] [facility] 27 has spoken to [employer] and workers comp does not feel this is work related and will not cover his Iv atb. Per Lu Becker he called the patients regular insurance High shanti Cost for the 1st week would be $262.10 then patient should be able to meet his deductible. Per ID Dr Ivy the patient is on Iv ancef 2 gm iv q8 hrs. Await final blood cultures. His plan is to return home with his wife Xena Styles. Will need a picc line.  Will follow

## 2023-03-28 NOTE — PROGRESS NOTES
Physical Therapy    Pt on PT caseload. Met with pt in room for treatment. Pt politely declined participation at this time reporting that he has gotten up multiple times this date and plans to later with RN. Will follow and reattempt at a later time.      Radha Gillis PT, DPT  [id number]

## 2023-03-28 NOTE — PROGRESS NOTES
POD#1 Awake, alert. No complaints. Denies CP, palpitations, SOB at rest, dizziness/lightheadedness. Vitals:    03/28/23 0310 03/28/23 0349 03/28/23 0533 03/28/23 0606   BP: (!) 145/87  (!) 135/92    Pulse: 75  75    Resp: 18 16 20 18   Temp: 97.6 °F (36.4 °C)  98 °F (36.7 °C)    TempSrc: Temporal  Temporal    SpO2: 94%  96%    Weight:       Height:         O2: RA 96%      Intake/Output Summary (Last 24 hours) at 3/28/2023 0740  Last data filed at 3/28/2023 0540  Gross per 24 hour   Intake 1080 ml   Output 3006 ml   Net -1926 ml         UO: 1225mL/8hr   CT output:  Pleural: 18mL/8hr (156mL/24hrs)      Recent Labs     03/26/23  0632 03/27/23  1327   WBC 11.1 16.1*   HGB 13.2 12.6   HCT 40.4 38.7    353      Recent Labs     03/26/23  0632 03/27/23  1327   BUN 10 11   CREATININE 0.9 0.9         Telemetry: SR      Physical Exam  Constitutional:       General: He is not in acute distress. Cardiovascular:      Rate and Rhythm: Normal rate and regular rhythm. Heart sounds: Normal heart sounds. No murmur heard. Pulmonary:      Effort: No respiratory distress. Breath sounds: Normal breath sounds. Comments: CT x2 in place and secure   Abdominal:      Palpations: Abdomen is soft. Musculoskeletal:         General: Normal range of motion. Skin:     General: Skin is warm and dry. Neurological:      Mental Status: He is alert and oriented to person, place, and time. Psychiatric:         Mood and Affect: Mood normal.         Behavior: Behavior normal.              POD#1    A/P:     1) Empyema  --Stable s/p R VATS, drainage of loculated pleural effusion, decortication on 3/27/2023  --Maintaining NSR--continue oral amiodarone for afib prophylaxis with plans to taper on dc  --VSS  --Tolerating RA  --antibiotics per ID   --in TB isolation -- no concern for TB at this time   --chest tube(s) without significant output and without airleaks. If CT drainage stable this afternoon, will remove straight tube.

## 2023-03-28 NOTE — PROGRESS NOTES
Occupational Therapy  OT BEDSIDE TREATMENT NOTE   [address] [address] [facility]  [address] [facility], [address] [place] [address]  Patient Name: Huber Dalal  MRN: [id number]  : 1965  Room: [address] [place] [address]     Per eval  Evaluating OT: Aldo Lucas OTR/L; [id number]        Referring Provider: Mitchell Nuñez MD    Specific Provider Orders/Date: OT Eval and Treat 23        Diagnosis: Back pain associated with peripheral numbness     Surgery: None this admission    Pertinent Medical History:  has a past medical history of Diabetes mellitus ([facility] [address] [facility]       Recommended Adaptive Equipment: TBD      Precautions:  Fall Risk, spinal neutrality, chest tube      Assessment of current deficits    [x] Functional mobility            [x]ADLs           [x] Strength                  []Cognition    [x] Functional transfers          [x] IADLs         [x] Safety Awareness   [x]Endurance    [x] Fine Coordination                         [x] Balance      [] Vision/perception   []Sensation      []Gross Motor Coordination             [] ROM           [] Delirium                   [] Motor Control      OT PLAN OF CARE   OT POC based on physician orders, patient diagnosis and results of clinical assessment     Frequency/Duration 1-3 days/wk for 2 weeks PRN   Specific OT Treatment Interventions to include:   * Instruction/training on adapted ADL techniques and AE recommendations to increase functional independence within precautions       * Training on energy conservation strategies, correct breathing pattern and techniques to improve independence/tolerance for self-care routine  * Functional transfer/mobility training/DME recommendations for increased independence, safety, and fall prevention  * Patient/Family education to increase follow through with safety techniques and functional independence  * Recommendation of environmental modifications for increased safety with functional Activities [address]     ADL/Home Mgt [id number] 10 1   Neuro Re-ed [address] [facility] manage/training  [id number]     Non-Billable Time     Total Timed Treatment 10 1     [address] ABREU/L [id number]

## 2023-03-28 NOTE — PROGRESS NOTES
adjustment of the mA/kV was utilized to reduce the radiation dose to as low as reasonably achievable. COMPARISON: None. HISTORY: ORDERING SYSTEM PROVIDED HISTORY: pain TECHNOLOGIST PROVIDED HISTORY: Additional Contrast?->None Reason for exam:->pain Decision Support Exception - unselect if not a suspected or confirmed emergency medical condition->Emergency Medical Condition (MA) What reading provider will be dictating this exam?->CRC FINDINGS: Mild gynecomastia. Small fat filled umbilical hernia. Fat filled right inguinal hernia. Small right pleural effusion with associated atelectasis or pneumonia in the right lung base. Small foci of gas in the right pleural space may represent infection. No pneumoperitoneum. Evaluation for neoplasm, infection, focal lesions, and trauma is limited without the use of IV contrast.  Suspect fatty infiltration of the liver. Gallbladder negative. Spleen mildly enlarged 14 cm craniocaudal.  Pancreas and adrenal glands negative. No hydronephrosis. Bowel negative for obstruction or inflammation. Large colonic stool burden. Normal appendix. Atherosclerotic nondilated aorta. No suspicious lymphadenopathy or significant ascites. Macro bladder Bones negative. Small right pleural effusion with atelectasis/pneumonia in the right lung base. Foci of gas in the right pleural space may relate to infection. The No acute abdominal findings. Constipation. XR CHEST (2 VW)    Result Date: 3/21/2023  EXAMINATION: TWO XRAY VIEWS OF THE CHEST 3/21/2023 8:13 am COMPARISON: 19 August 2022 HISTORY: ORDERING SYSTEM PROVIDED HISTORY: pain TECHNOLOGIST PROVIDED HISTORY: Reason for exam:->pain What reading provider will be dictating this exam?->CRC FINDINGS: Suboptimal inspiration. New right pleural effusion with adjacent atelectasis and or infiltrate. Crowding of bronchovascular markings at the left base. New right pleural effusion with adjacent atelectasis and or infiltrate.  Suboptimal right pleural effusion with adjacent opacities likely related to pneumonia. Note made of foci of gas within the pleural effusion of possible infectious etiology. Assessment:  56 y.o. male, lifelong non-smoker, with past medical history type 2 diabetes, BARBIE diagnosed 13 years ago with CPAP of 14 cm H2O who is noncompliant and L4-S1 laminectomy in 08/2022    3/2023 Presented to [facility] ER and [address] where he was told that he had T11 and T12 herniated disc, he was started on prednisone and NSAIDs.    3/21 [facility] Chest x-ray new right pleural effusion with adjacent atelectasis  3/23 MRI ordered by Dr Delio Abreu. CT chest loculated moderate right pleural effusion with atelectatic changes of the largely atelectatic right middle and right lower lobe. Echo no evidence of endocarditis   3/24: room air/2L  3/25: room air/2L   PCT 0.63  3/26: room air/2L   3/27 on RA, R VATS, drainage of loculated pleural effusion, decortication  3/28 on room air; right chest tube 150cc/24hrs     Right chest empyema  S/p Right VATS 3/27 per CT surgery  Right chest tube 150cc/24hrs   MSSA septicemia  3/21 BC staph aureus, repeat cultures negative  S/p Rocephin, azithromycin and vancomycin. On ancef 6  Patient refusing JOE  Right lower lobe pneumonia  On ancef 6, duonebs   Incentive spirometry  Encourage coughing, deep breathing, activity   Right Basal Atelectasis   History of BARBIE with noncompliance  Diagnosed 13 years ago was ordered CPAP 14 cm H2O but could not tolerate the pressure  Outpatient PSG as patient is willing to get treated  DVT/PE prophylaxis on Lovenox    Electronically signed by CHINTAN Pagan CNP on 3/28/2023 at 10:40 AM    Attending Attestation Note:    Patient seen and examined with Hospital Staff, NP. I have extensively reviewed the chart lab work and imaging. I agree with above. Modifications and amendment of note made as necessary.     In addition, the following apply:    Current

## 2023-03-28 NOTE — PROGRESS NOTES
[facility] PROGRESS NOTE      Chief complaint: Follow-up of Staphylococcus aureus bacteremia    The patient is a 56 y.o. male with history of DM, L4-S1 laminectomy in 08/2022, presented on 03/21 with back pain for a week after he was using a sledge hammer at work. On admission, he was afebrile and hemodynamically stable with leukocytosis of 22,000. Chest x-ray showed new right pleural effusion with adjacent atelectasis and/or infiltrate. CT abdomen and pelvis showed small right pleural effusion with infiltrate in the right lung base, no acute abdominal findings. MRI thoracolumbar spine with and without contrast showed large bulky osteophytes along the right aspect of T6/T7, T7/T8, T8/T9, T9/T10 with edema and enhancement suggestive of recent trauma or repetitive injury, mild central canal stenosis at L4/L5 with moderate bilateral neural foraminal narrowing. Blood cultures showed MSSA. He received a dose of ceftriaxone on admission. Azithromycin and vancomycin were started on admission then switched to cefazolin on 03/23. CT chest showed loculated moderate right pleural effusion with atelectatic changes of largely atelectatic right middle and lower lobe with scattered gas densities in the right lower lobe pleural portion. Transthoracic echocardiogram showed no evidence of endocarditis. He underwent right VATS with total lung decortication and drainage of loculated pleural effusion on 03/27 during which copious gelatinous material within the right chest cavity was noted and approximately 1000 mL of fluid was removed. Tissue Gram stain and culture showed rare polymorphonuclear leukocytes, no epithelial cells, no organisms, no growth to date. Subjective: Patient was seen and examined. No chills, no abdominal pain, no diarrhea, no rash, no itching, has markedly less chest pain. Afebrile.       Objective:  BP (!) 144/85   Pulse 75   Temp (!) 96.7 °F (35.9 °C) (Temporal)   Resp 21   Ht 6' 3\" (1.905 m)   Wt 240 lb (108.9 kg)   SpO2 92%   BMI 30.00 kg/m²   Constitutional: Alert, not in distress  Respiratory: Clear breath sounds, no crackles, no wheezes  Cardiovascular: Regular rate and rhythm, no murmurs  Gastrointestinal: Bowel sounds present, soft, nontender  Skin: Warm and dry, no active dermatoses. Chest tubes in place  Musculoskeletal: No joint swelling, no joint erythema    Labs, imaging, and medical records/notes were personally reviewed. Assessment:  Sepsis, resolved, secondary to MSSA bacteremia, etiology unclear, suspect associated with loculated effusion, s/p right VATS with total lung decortication and drainage of loculated pleural effusion on 03/27. Transthoracic echocardiogram showed no evidence of endocarditis. JOE was recommended but patient deferred for now. Recommendations:  Continue cefazolin 2 g every 8 hours. Anticipate 4 weeks of IV antibiotic therapy from 03/27-04/24. Insert midline. Follow up tissue cultures. Continue supportive care. Thank you for involving me in the care of Bette Collado. I will continue to follow. Please do not hesitate to call for any questions or concerns.       Electronically signed by Brice Kenyon MD on 3/28/2023 at 10:25 AM

## 2023-03-29 ENCOUNTER — APPOINTMENT (OUTPATIENT)
Dept: GENERAL RADIOLOGY | Age: 58
DRG: 853 | End: 2023-03-29
Payer: OTHER MISCELLANEOUS

## 2023-03-29 LAB
ACID FAST STN SPEC QL: NORMAL
ACID FAST STN SPEC QL: NORMAL
ANION GAP SERPL CALCULATED.3IONS-SCNC: 7 MMOL/L (ref 7–16)
BUN SERPL-MCNC: 18 MG/DL (ref 6–20)
CALCIUM SERPL-MCNC: 8.3 MG/DL (ref 8.6–10.2)
CHLORIDE SERPL-SCNC: 100 MMOL/L (ref 98–107)
CO2 SERPL-SCNC: 29 MMOL/L (ref 22–29)
CREAT SERPL-MCNC: 0.8 MG/DL (ref 0.7–1.2)
ERYTHROCYTE [DISTWIDTH] IN BLOOD BY AUTOMATED COUNT: 11.9 FL (ref 11.5–15)
GLUCOSE SERPL-MCNC: 195 MG/DL (ref 74–99)
HCT VFR BLD AUTO: 38.4 % (ref 37–54)
HGB BLD-MCNC: 11.9 G/DL (ref 12.5–16.5)
MCH RBC QN AUTO: 28 PG (ref 26–35)
MCHC RBC AUTO-ENTMCNC: 31 % (ref 32–34.5)
MCV RBC AUTO: 90.4 FL (ref 80–99.9)
METER GLUCOSE: 176 MG/DL (ref 74–99)
METER GLUCOSE: 212 MG/DL (ref 74–99)
METER GLUCOSE: 216 MG/DL (ref 74–99)
METER GLUCOSE: 328 MG/DL (ref 74–99)
PLATELET # BLD AUTO: 282 E9/L (ref 130–450)
PMV BLD AUTO: 8.8 FL (ref 7–12)
POTASSIUM SERPL-SCNC: 4.7 MMOL/L (ref 3.5–5)
RBC # BLD AUTO: 4.25 E12/L (ref 3.8–5.8)
SODIUM SERPL-SCNC: 136 MMOL/L (ref 132–146)
WBC # BLD: 9.9 E9/L (ref 4.5–11.5)

## 2023-03-29 PROCEDURE — 36410 VNPNXR 3YR/> PHY/QHP DX/THER: CPT

## 2023-03-29 PROCEDURE — 6360000002 HC RX W HCPCS

## 2023-03-29 PROCEDURE — 76937 US GUIDE VASCULAR ACCESS: CPT

## 2023-03-29 PROCEDURE — 6370000000 HC RX 637 (ALT 250 FOR IP)

## 2023-03-29 PROCEDURE — C1751 CATH, INF, PER/CENT/MIDLINE: HCPCS

## 2023-03-29 PROCEDURE — 2140000000 HC CCU INTERMEDIATE R&B

## 2023-03-29 PROCEDURE — 82962 GLUCOSE BLOOD TEST: CPT

## 2023-03-29 PROCEDURE — 85027 COMPLETE CBC AUTOMATED: CPT

## 2023-03-29 PROCEDURE — 6370000000 HC RX 637 (ALT 250 FOR IP): Performed by: INTERNAL MEDICINE

## 2023-03-29 PROCEDURE — 2580000003 HC RX 258

## 2023-03-29 PROCEDURE — 6360000002 HC RX W HCPCS: Performed by: INTERNAL MEDICINE

## 2023-03-29 PROCEDURE — 71045 X-RAY EXAM CHEST 1 VIEW: CPT

## 2023-03-29 PROCEDURE — S5553 INSULIN LONG ACTING 5 U: HCPCS | Performed by: INTERNAL MEDICINE

## 2023-03-29 PROCEDURE — 80048 BASIC METABOLIC PNL TOTAL CA: CPT

## 2023-03-29 PROCEDURE — 99222 1ST HOSP IP/OBS MODERATE 55: CPT | Performed by: INTERNAL MEDICINE

## 2023-03-29 PROCEDURE — 36415 COLL VENOUS BLD VENIPUNCTURE: CPT

## 2023-03-29 PROCEDURE — 2580000003 HC RX 258: Performed by: INTERNAL MEDICINE

## 2023-03-29 RX ORDER — INSULIN ASPART 100 [IU]/ML
INJECTION, SOLUTION INTRAVENOUS; SUBCUTANEOUS
Qty: 5 ADJUSTABLE DOSE PRE-FILLED PEN SYRINGE | Refills: 3 | Status: SHIPPED | OUTPATIENT
Start: 2023-03-29

## 2023-03-29 RX ORDER — OXYCODONE HYDROCHLORIDE AND ACETAMINOPHEN 5; 325 MG/1; MG/1
1 TABLET ORAL EVERY 6 HOURS PRN
Qty: 28 TABLET | Refills: 0 | Status: SHIPPED | OUTPATIENT
Start: 2023-03-29 | End: 2023-04-05

## 2023-03-29 RX ORDER — INSULIN LISPRO 100 [IU]/ML
20 INJECTION, SOLUTION INTRAVENOUS; SUBCUTANEOUS
Status: DISCONTINUED | OUTPATIENT
Start: 2023-03-29 | End: 2023-03-30 | Stop reason: HOSPADM

## 2023-03-29 RX ADMIN — GUAIFENESIN 400 MG: 400 TABLET ORAL at 21:40

## 2023-03-29 RX ADMIN — BENZONATATE 100 MG: 100 CAPSULE ORAL at 21:41

## 2023-03-29 RX ADMIN — SODIUM CHLORIDE, PRESERVATIVE FREE 20 ML: 5 INJECTION INTRAVENOUS at 08:41

## 2023-03-29 RX ADMIN — Medication 10 ML: at 08:41

## 2023-03-29 RX ADMIN — Medication 10 ML: at 21:42

## 2023-03-29 RX ADMIN — ROSUVASTATIN CALCIUM 20 MG: 20 TABLET, FILM COATED ORAL at 21:41

## 2023-03-29 RX ADMIN — CEFAZOLIN 2000 MG: 2 INJECTION, POWDER, FOR SOLUTION INTRAMUSCULAR; INTRAVENOUS at 08:42

## 2023-03-29 RX ADMIN — ACETAMINOPHEN 325MG 650 MG: 325 TABLET ORAL at 06:37

## 2023-03-29 RX ADMIN — INSULIN LISPRO 4 UNITS: 100 INJECTION, SOLUTION INTRAVENOUS; SUBCUTANEOUS at 21:40

## 2023-03-29 RX ADMIN — Medication 5 MG: at 21:41

## 2023-03-29 RX ADMIN — BENZONATATE 100 MG: 100 CAPSULE ORAL at 08:42

## 2023-03-29 RX ADMIN — POLYETHYLENE GLYCOL 3350 17 G: 17 POWDER, FOR SOLUTION ORAL at 08:42

## 2023-03-29 RX ADMIN — INSULIN GLARGINE-YFGN 50 UNITS: 100 INJECTION, SOLUTION SUBCUTANEOUS at 21:40

## 2023-03-29 RX ADMIN — INSULIN LISPRO 4 UNITS: 100 INJECTION, SOLUTION INTRAVENOUS; SUBCUTANEOUS at 12:12

## 2023-03-29 RX ADMIN — OXYCODONE HYDROCHLORIDE 10 MG: 10 TABLET ORAL at 15:40

## 2023-03-29 RX ADMIN — OXYCODONE HYDROCHLORIDE 5 MG: 5 TABLET ORAL at 00:03

## 2023-03-29 RX ADMIN — PANTOPRAZOLE SODIUM 40 MG: 40 TABLET, DELAYED RELEASE ORAL at 06:38

## 2023-03-29 RX ADMIN — AMLODIPINE BESYLATE 5 MG: 5 TABLET ORAL at 08:42

## 2023-03-29 RX ADMIN — SERTRALINE 100 MG: 100 TABLET, FILM COATED ORAL at 08:42

## 2023-03-29 RX ADMIN — CEFAZOLIN 2000 MG: 2 INJECTION, POWDER, FOR SOLUTION INTRAMUSCULAR; INTRAVENOUS at 18:33

## 2023-03-29 RX ADMIN — OXYCODONE HYDROCHLORIDE 10 MG: 10 TABLET ORAL at 21:41

## 2023-03-29 RX ADMIN — HEPARIN 100 UNITS: 100 SYRINGE at 21:42

## 2023-03-29 RX ADMIN — SODIUM CHLORIDE, PRESERVATIVE FREE 10 ML: 5 INJECTION INTRAVENOUS at 18:33

## 2023-03-29 RX ADMIN — INSULIN LISPRO 15 UNITS: 100 INJECTION, SOLUTION INTRAVENOUS; SUBCUTANEOUS at 08:44

## 2023-03-29 RX ADMIN — OXYCODONE HYDROCHLORIDE 10 MG: 10 TABLET ORAL at 06:44

## 2023-03-29 RX ADMIN — INSULIN GLARGINE-YFGN 50 UNITS: 100 INJECTION, SOLUTION SUBCUTANEOUS at 08:43

## 2023-03-29 RX ADMIN — INSULIN LISPRO 15 UNITS: 100 INJECTION, SOLUTION INTRAVENOUS; SUBCUTANEOUS at 12:12

## 2023-03-29 RX ADMIN — ACETAMINOPHEN 325MG 650 MG: 325 TABLET ORAL at 12:13

## 2023-03-29 RX ADMIN — ACETAMINOPHEN 325MG 650 MG: 325 TABLET ORAL at 00:03

## 2023-03-29 RX ADMIN — INSULIN LISPRO 4 UNITS: 100 INJECTION, SOLUTION INTRAVENOUS; SUBCUTANEOUS at 08:43

## 2023-03-29 RX ADMIN — GUAIFENESIN 400 MG: 400 TABLET ORAL at 08:42

## 2023-03-29 RX ADMIN — CEFAZOLIN 2000 MG: 2 INJECTION, POWDER, FOR SOLUTION INTRAMUSCULAR; INTRAVENOUS at 00:05

## 2023-03-29 ASSESSMENT — PAIN DESCRIPTION - DESCRIPTORS
DESCRIPTORS: ACHING;SORE;TENDER
DESCRIPTORS: ACHING;DULL
DESCRIPTORS: SORE
DESCRIPTORS: ACHING;SORE;TENDER
DESCRIPTORS: ACHING;DISCOMFORT
DESCRIPTORS: ACHING;SORE;TENDER

## 2023-03-29 ASSESSMENT — PAIN DESCRIPTION - LOCATION
LOCATION: INCISION
LOCATION: RIB CAGE
LOCATION: CHEST
LOCATION: CHEST
LOCATION: INCISION
LOCATION: INCISION

## 2023-03-29 ASSESSMENT — PAIN - FUNCTIONAL ASSESSMENT
PAIN_FUNCTIONAL_ASSESSMENT: PREVENTS OR INTERFERES SOME ACTIVE ACTIVITIES AND ADLS

## 2023-03-29 ASSESSMENT — PAIN DESCRIPTION - ORIENTATION
ORIENTATION: RIGHT

## 2023-03-29 ASSESSMENT — PAIN DESCRIPTION - FREQUENCY: FREQUENCY: CONTINUOUS

## 2023-03-29 ASSESSMENT — PAIN SCALES - WONG BAKER
WONGBAKER_NUMERICALRESPONSE: 0
WONGBAKER_NUMERICALRESPONSE: NO HURT
WONGBAKER_NUMERICALRESPONSE: NO HURT
WONGBAKER_NUMERICALRESPONSE: 0

## 2023-03-29 ASSESSMENT — PAIN SCALES - GENERAL
PAINLEVEL_OUTOF10: 5
PAINLEVEL_OUTOF10: 2
PAINLEVEL_OUTOF10: 8
PAINLEVEL_OUTOF10: 7
PAINLEVEL_OUTOF10: 4
PAINLEVEL_OUTOF10: 7
PAINLEVEL_OUTOF10: 10
PAINLEVEL_OUTOF10: 8
PAINLEVEL_OUTOF10: 2

## 2023-03-29 ASSESSMENT — PAIN DESCRIPTION - PAIN TYPE: TYPE: SURGICAL PAIN

## 2023-03-29 ASSESSMENT — PAIN DESCRIPTION - ONSET: ONSET: ON-GOING

## 2023-03-29 NOTE — PROGRESS NOTES
[facility] PROGRESS NOTE      Chief complaint: Follow-up of Staphylococcus aureus (MSSA) bacteremia    The patient is a 56 y.o. male with history of DM, L4-S1 laminectomy in 08/2022, presented on 03/21 with back pain for a week after he was using a sledge hammer at work. On admission, he was afebrile and hemodynamically stable with leukocytosis of 22,000. Chest x-ray showed new right pleural effusion with adjacent atelectasis and/or infiltrate. CT abdomen and pelvis showed small right pleural effusion with infiltrate in the right lung base, no acute abdominal findings. MRI thoracolumbar spine with and without contrast showed large bulky osteophytes along the right aspect of T6/T7, T7/T8, T8/T9, T9/T10 with edema and enhancement suggestive of recent trauma or repetitive injury, mild central canal stenosis at L4/L5 with moderate bilateral neural foraminal narrowing. Blood cultures showed MSSA. He received a dose of ceftriaxone on admission. Azithromycin and vancomycin were started on admission then switched to cefazolin on 03/23. CT chest showed loculated moderate right pleural effusion with atelectatic changes of largely atelectatic right middle and lower lobe with scattered gas densities in the right lower lobe pleural portion. Transthoracic echocardiogram showed no evidence of endocarditis. He underwent right VATS with total lung decortication and drainage of loculated pleural effusion on 03/27 during which copious gelatinous material within the right chest cavity was noted and approximately 1000 mL of fluid was removed. Tissue Gram stain and culture showed rare polymorphonuclear leukocytes, no epithelial cells, no organisms, no growth to date. Subjective: Patient was seen and examined. No chills, no abdominal pain, no diarrhea, no rash, no itching, has markedly less chest pain. Afebrile.       Objective:  BP (!) 152/84   Pulse 76   Temp 97.5 °F (36.4 °C) (Temporal)   Resp 18   Ht 6' 3\" (1.905 m)   Wt

## 2023-03-29 NOTE — PATIENT CARE CONFERENCE
P Quality Flow/Interdisciplinary Rounds Progress Note        Quality Flow Rounds held on March 29, 2023    Disciplines Attending:  Bedside Nurse, , , and Nursing Unit Leadership    Merary Ross was admitted on 3/21/2023 10:37 AM    Anticipated Discharge Date:       Disposition:    Nikita Score:  Nikita Scale Score: 21    Readmission Risk              Risk of Unplanned Readmission:  13           Discussed patient goal for the day, patient clinical progression, and barriers to discharge.   The following Goal(s) of the Day/Commitment(s) have been identified:  ambulate/discharge planning       Rickey Goncalves RN  March 29, 2023

## 2023-03-29 NOTE — PLAN OF CARE
Problem: Chronic Conditions and Co-morbidities  Goal: Patient's chronic conditions and co-morbidity symptoms are monitored and maintained or improved  Outcome: Progressing     Problem: Safety - Adult  Goal: Free from fall injury  Outcome: Progressing     Problem: Discharge Planning  Goal: Discharge to home or other facility with appropriate resources  Outcome: Progressing     Problem: Nutrition Deficit:  Goal: Optimize nutritional status  Outcome: Progressing

## 2023-03-29 NOTE — PROGRESS NOTES
tomorrow. DVT prophylaxis: Subcutaneous heparin  CODE STATUS: Patient is a full code      Subjective:     Feels well. No chest pain or shortness of breath. No fevers or chills. Mild discomfort from chest tube. Objective:    Physical examination:  VS: BP (!) 155/83   Pulse 88   Temp 98 °F (36.7 °C) (Temporal)   Resp 18   Ht 6' 3\" (1.905 m)   Wt 240 lb (108.9 kg)   SpO2 93%   BMI 30.00 kg/m²   I/O:   Intake/Output Summary (Last 24 hours) at 3/29/2023 1645  Last data filed at 3/29/2023 1336  Gross per 24 hour   Intake 2160 ml   Output 2324 ml   Net -164 ml     General Appearance: alert and oriented to person, place and time, in no acute distress  HEENT: normocephalic and atraumatic, pupils equal, round, and reactive to light, Ssupple and non-tender without mass, no thyromegaly   Cardiovascular: normal rate, regular rhythm, normal S1 and S2, no murmurs, rubs, clicks, or gallops, distal pulses intact, no carotid bruits, no JVD  Pulmonary/Chest: clear to auscultation bilaterally- no wheezes, rales or rhonchi, normal air movement, no respiratory distress. Chest tube in place.    Abdomen: soft, non-tender, non-distended, normal bowel sounds, no masses   Extremities: no cyanosis, clubbing or edema, pulse   Musculoskeletal: normal range of motion, no joint swelling, deformity or tenderness  Neurological: alert, oriented, normal speech, no focal findings or movement disorder noted  Skin: warm and dry, no rash or erythema    Medications:  Scheduled Meds:   insulin lispro  20 Units SubCUTAneous TID     insulin glargine  50 Units SubCUTAneous BID    lidocaine  5 mL IntraDERmal Once    sodium chloride flush  5-40 mL IntraVENous 2 times per day    heparin flush  1 mL IntraVENous 2 times per day    sodium chloride flush  5-40 mL IntraVENous 2 times per day    acetaminophen  650 mg Oral Q6H    ipratropium-albuterol  1 ampule Inhalation Q4H WA    enoxaparin  30 mg SubCUTAneous BID    mupirocin   Each Nostril BID right aspect of T6/T7, T7/T8, T8/T9, and T9/T10. There is enhancement associated with the osteophytes at these levels with corresponding increased STIR signal.  There is also low level enhancement within right aspect of T9 vertebral body with corresponding hypointense signal on T1 weighted imaging and increased STIR signal.  There is edema within the paravertebral soft tissue along right aspect of T6, T7, T8, T9, and T10. No evidence of fracture or malalignment involving the thoracic spine or lumbar spine. There is minimal central disc herniation at T3/T4 resulting in minimal effacement of the ventral cord. There is also mild right neural foraminal narrowing at this level. Mild disc herniations at T10/T11 and T11/T12. No central canal stenosis at these levels. No fracture or malalignment involving the lumbar spine. Postsurgical changes are present with laminectomy at level of L5. No loculated fluid collections within the laminectomy bed. No evidence of epidural abscess or hematoma involving thoracic spine or lumbar spine. Mild circumferential central canal stenosis present at L4/L5 due to facet hypertrophy and circumferential disc bulge. Moderate bilateral neural foraminal narrowing also present at L4/L5. Moderate disc space narrowing at L5/S1. 1. Large bulky osteophytes are seen along the right aspect of T6/T7, T7/T8, T8/T9, and T9/T10. There is edema and enhancement associated with these osteophytes with surrounding soft tissue edema. Findings could suggest recent trauma or repetitive injury. Follow-up MRI recommended to exclude developing osteomyelitis. No evidence of fracture. 2. Additional edema present within right aspect of T9 vertebral body which also may be related to recent trauma or repetitive injury. No evidence of T9 fracture. 3. No significant central canal stenosis involving the thoracic spine.  4. Mild central canal stenosis at L4/L5 with moderate bilateral neural foraminal

## 2023-03-29 NOTE — PROGRESS NOTES
Power chg 1 lumen midline Placement 3/29/2023    Product number: [id number]   Lot Number: [id number]      Ultrasound: yes/sonosite   Right Brachial vein:                Upper Arm Circumference: (CM) 30    Size:(FR)/GUAGE 4.5 fr/ 17 guage    Exposed Length: (CM) 1    Internal Length: (CM) 14   Cut: (CM) 0   Vein Measurement: 0.53    Geovany Mae RN  3/29/2023  12:55 PM

## 2023-03-29 NOTE — CONSULTS
ENDOCRINOLOGY INITIAL CONSULTATION NOTE      Date of admission: 3/21/2023  Date of service: 3/29/2023  Admitting physician: Kate Olivas DO   Primary Care Physician: No primary care provider on file. Consultant physician: Bee Wyatt MD     Reason for the consultation:  Uncontrolled DM    History of Present Illness: The history is provided by the patient. Accuracy of the patient data is excellent    Hansa Schmidt is a very pleasant 56 y.o. old male with PMH listed below admitted to [address] [place] [address] on 3/21/2023 because of abdominal pain, endocrine service was consulted for diabetes management. Pt found to have empyema and now s/p VATS. On admission blood glucose was 788, but had been better controlled after insulin regimen was started. Pt was on prednisone prior to being admitted and did receive dexamethasone last on 3/27, after which blood glucose was in the 400s. Prior to admission  The patient was diagnosed with type 2 DM at the age 30. Prior to admission patient was on Metformin 1000 mg BID, Ozempic, Tresiba 80u daily, Novolog sliding scale. He reports poor compliance with his medications over past few months. Patient has had no hypoglycemic episodes. Patient has not been eating consistent carbohydrate meals, self-blood glucose monitoring has been above goal prior to admission. In addition, patient denied macrovascular or microvascular complications.  Has been up to date with yearly diabetic eye exam.   Lab Results   Component Value Date/Time    LABA1C 10.3 03/28/2023 02:45 PM       Inpatient diet:   Carb Restricted diet     Point of care glucose monitoring   (Independently reviewed)   Recent Labs     03/27/23 2009 03/27/23  2205 03/28/23  0624 03/28/23  1101 03/28/23  1348 03/28/23  1629 03/28/23  1931 03/29/23  0629   GLUMET 454* 430* 273* 432* 423* 381* 409* 216*       Past medical history:   Past Medical History:   Diagnosis Date    Diabetes mellitus ([address]        Past surgical history:  Past Surgical also may be related to recent trauma or repetitive injury. No evidence of T9   fracture. 3. No significant central canal stenosis involving the thoracic spine. 4. Mild central canal stenosis at L4/L5 with moderate bilateral neural   foraminal narrowing. 5. Note made of a right pleural effusion with adjacent opacities likely   related to pneumonia. Note made of foci of gas within the pleural effusion   of possible infectious etiology. XR CHEST (2 VW)   Final Result   New right pleural effusion with adjacent atelectasis and or infiltrate. Suboptimal inspiration. CT ABDOMEN PELVIS WO CONTRAST Additional Contrast? None   Final Result   Small right pleural effusion with atelectasis/pneumonia in the right lung   base. Foci of gas in the right pleural space may relate to infection. The      No acute abdominal findings. Constipation. XR CHEST PORTABLE    (Results Pending)   XR CHEST PORTABLE    (Results Pending)       Medical Records/Labs/Images review:   I personally reviewed and summarized previous records   All labs and imaging were reviewed independently     [address] Lara Ventura [facility], a 56 y.o.-old male seen today for inpatient diabetes management     Diabetes Mellitus type 2  Patient's diabetes is uncontrolled, HgbA1c 10.3%  For now, will change diabetes regimen to:  Continue Lantus 50u BID  Increase Humalog to 20u TID with meals  Continue high dose sliding scale  Continue glucose check with meals and at bedtime   Will titrate insulin dose based on the blood glucose trend & insulin requirement    Dietary noncompliance  Discussed with patient the importance of eating consistent carbohydrate meals, avoiding high glycemic index food.  Also, discussed with patient the risk and negative consequences of dietary noncompliance on blood glucose control, blood pressure and weight    HLD  Crestor 20 mg daily     Interdisciplinary plan for communication with healthcare providers:

## 2023-03-29 NOTE — PROGRESS NOTES
Midline placed. Final chest tube removed. ID called to inform that patient will need orders for home IV antibiotics.

## 2023-03-29 NOTE — PROGRESS NOTES
Remaining chest tube removed without difficulty. Patient tolerated well. CTS to sign off.  Follow up in office with scheduled appointment    Future Appointments   Date Time Provider Janette Han   4/11/2023  1:30 PM Eloisa Cisse DO CARDIO SURG [facility]   8/18/2023  2:45 PM Nolberto Hood 1302

## 2023-03-29 NOTE — PROGRESS NOTES
mg Oral Q6H Daniella A CHINTAN Vazquez - CNP   650 mg at 03/29/23 1213    ondansetron (ZOFRAN-ODT) disintegrating tablet 4 mg  4 mg Oral Q8H PRN Daniella A CHINTAN Vazquez CNP        Or    ondansetron (ZOFRAN) injection 4 mg  4 mg IntraVENous Q6H PRN Daniella A ROSENDO VazquezN - CNP        bisacodyl (DULCOLAX) EC tablet 5 mg  5 mg Oral Daily PRN Daniella A CHINTAN Vazquez - CNP        ipratropium-albuterol (DUONEB) nebulizer solution 1 ampule  1 ampule Inhalation Q4H WA CHINTAN Florentino - CNP   1 ampule at 03/28/23 2031    Menthol LOZG 1 lozenge  1 lozenge Oral Q2H PRN Daniella A ROSENDO VazquezN - CNP        morphine (PF) injection 2 mg  2 mg IntraVENous Q4H PRN CHINTAN Florentino - CNP   2 mg at 03/28/23 1033    sodium chloride (OCEAN, BABY AYR) 0.65 % nasal spray 1 spray  1 spray Each Nostril PRN Daniella A ROSENDO VazquezN - CNP        trimethobenzamide (TIGAN) injection 200 mg  200 mg IntraMUSCular Q6H PRN Daniella Vazquez APRN - ARIC        hydrALAZINE (APRESOLINE) injection 10 mg  10 mg IntraVENous Q4H PRN Daniella A ROSENDO VazquezN - CNP        enoxaparin Sodium (LOVENOX) injection 30 mg  30 mg SubCUTAneous BID CHINTAN Momin - CNP        mupirocin (BACTROBAN) 2 % ointment   Each Nostril BID Daniella Vazquez APRN - CNP        vitamin D (ERGOCALCIFEROL) capsule 50,000 Units  50,000 Units Oral Weekly DaniellaCHINTAN Medrano - CNP        ceFAZolin (ANCEF) 2,000 mg in sterile water 20 mL IV syringe  2,000 mg IntraVENous Q8H Daniellaeliane Vazquez APRN - CNP   2,000 mg at 03/29/23 0842    lactobacillus (CULTURELLE) capsule 1 capsule  1 capsule Oral BID CHINTAN Florentino CNP   1 capsule at 03/27/23 5028    sennosides-docusate sodium (SENOKOT-S) 8.6-50 MG tablet 2 tablet  2 tablet Oral QPM CHINTAN Momin CNP   2 tablet at 03/28/23 1703    sennosides-docusate sodium (SENOKOT-S) 8.6-50 MG tablet 2 tablet  2 tablet Oral BID PRN CHINTAN Florentino CNP        tiZANidine (ZANAFLEX) tablet 4 mg  4 mg Oral Q6H PRN CHINTAN Florentino CNP   4 mg at 03/26/23 2019    diclofenac

## 2023-03-29 NOTE — CARE COORDINATION
CM update note: LOS: day 8. POD#2 RT VATS, drainage of loculated pleural effusion, decort. CT x 1. IV cefazolin 2gm q 8 hrs for 4 weeks. Met with pt in room. Up in chair. On RA. Plan is home at discharge. [facility] has been setup. Confirmed with Dari with [facility] they are following pt for home iv antibiotics. Spoke with Hema Quintero with [facility] and they are following for iv antibiotic and supplies. Faxed iv cefazolin script to [facility]. Fax#[phone]. Will send email to [employer] to check on pt's [employer] and/or [employer] coverage for hospital stay. Sw/cm will follow.

## 2023-03-30 ENCOUNTER — APPOINTMENT (OUTPATIENT)
Dept: GENERAL RADIOLOGY | Age: 58
DRG: 853 | End: 2023-03-30
Payer: OTHER MISCELLANEOUS

## 2023-03-30 VITALS
WEIGHT: 240 LBS | OXYGEN SATURATION: 98 % | SYSTOLIC BLOOD PRESSURE: 139 MMHG | DIASTOLIC BLOOD PRESSURE: 81 MMHG | HEART RATE: 84 BPM | BODY MASS INDEX: 29.84 KG/M2 | HEIGHT: 75 IN | RESPIRATION RATE: 20 BRPM | TEMPERATURE: 97.5 F

## 2023-03-30 LAB
ANION GAP SERPL CALCULATED.3IONS-SCNC: 8 MMOL/L (ref 7–16)
BACTERIA FLD AEROBE CULT: NORMAL
BACTERIA SPEC AEROBE CULT: NORMAL
BLOOD BANK DISPENSE STATUS: NORMAL
BLOOD BANK PRODUCT CODE: NORMAL
BPU ID: NORMAL
BUN SERPL-MCNC: 15 MG/DL (ref 6–20)
CALCIUM SERPL-MCNC: 8.8 MG/DL (ref 8.6–10.2)
CHLORIDE SERPL-SCNC: 101 MMOL/L (ref 98–107)
CO2 SERPL-SCNC: 31 MMOL/L (ref 22–29)
CREAT SERPL-MCNC: 0.9 MG/DL (ref 0.7–1.2)
DESCRIPTION BLOOD BANK: NORMAL
ERYTHROCYTE [DISTWIDTH] IN BLOOD BY AUTOMATED COUNT: 11.7 FL (ref 11.5–15)
GLUCOSE SERPL-MCNC: 180 MG/DL (ref 74–99)
GRAM STN SPEC: NORMAL
HCT VFR BLD AUTO: 39.8 % (ref 37–54)
HGB BLD-MCNC: 12.6 G/DL (ref 12.5–16.5)
LOEFFLER MB STN SPEC: NORMAL
LOEFFLER MB STN SPEC: NORMAL
MCH RBC QN AUTO: 28.2 PG (ref 26–35)
MCHC RBC AUTO-ENTMCNC: 31.7 % (ref 32–34.5)
MCV RBC AUTO: 89 FL (ref 80–99.9)
METER GLUCOSE: 188 MG/DL (ref 74–99)
METER GLUCOSE: 188 MG/DL (ref 74–99)
PLATELET # BLD AUTO: 323 E9/L (ref 130–450)
PMV BLD AUTO: 9 FL (ref 7–12)
POTASSIUM SERPL-SCNC: 4.4 MMOL/L (ref 3.5–5)
RBC # BLD AUTO: 4.47 E12/L (ref 3.8–5.8)
SODIUM SERPL-SCNC: 140 MMOL/L (ref 132–146)
WBC # BLD: 10.2 E9/L (ref 4.5–11.5)

## 2023-03-30 PROCEDURE — 6370000000 HC RX 637 (ALT 250 FOR IP)

## 2023-03-30 PROCEDURE — 2580000003 HC RX 258

## 2023-03-30 PROCEDURE — 6360000002 HC RX W HCPCS: Performed by: INTERNAL MEDICINE

## 2023-03-30 PROCEDURE — 36415 COLL VENOUS BLD VENIPUNCTURE: CPT

## 2023-03-30 PROCEDURE — 71045 X-RAY EXAM CHEST 1 VIEW: CPT

## 2023-03-30 PROCEDURE — 6370000000 HC RX 637 (ALT 250 FOR IP): Performed by: STUDENT IN AN ORGANIZED HEALTH CARE EDUCATION/TRAINING PROGRAM

## 2023-03-30 PROCEDURE — 6370000000 HC RX 637 (ALT 250 FOR IP): Performed by: INTERNAL MEDICINE

## 2023-03-30 PROCEDURE — 36592 COLLECT BLOOD FROM PICC: CPT

## 2023-03-30 PROCEDURE — 82962 GLUCOSE BLOOD TEST: CPT

## 2023-03-30 PROCEDURE — 6360000002 HC RX W HCPCS

## 2023-03-30 PROCEDURE — 2580000003 HC RX 258: Performed by: INTERNAL MEDICINE

## 2023-03-30 PROCEDURE — 80048 BASIC METABOLIC PNL TOTAL CA: CPT

## 2023-03-30 PROCEDURE — 99232 SBSQ HOSP IP/OBS MODERATE 35: CPT | Performed by: INTERNAL MEDICINE

## 2023-03-30 PROCEDURE — S5553 INSULIN LONG ACTING 5 U: HCPCS | Performed by: INTERNAL MEDICINE

## 2023-03-30 PROCEDURE — 85027 COMPLETE CBC AUTOMATED: CPT

## 2023-03-30 PROCEDURE — 94640 AIRWAY INHALATION TREATMENT: CPT

## 2023-03-30 RX ORDER — LACTOBACILLUS RHAMNOSUS GG 10B CELL
1 CAPSULE ORAL 2 TIMES DAILY
Qty: 30 CAPSULE | Refills: 0 | Status: SHIPPED | OUTPATIENT
Start: 2023-03-30

## 2023-03-30 RX ORDER — ERGOCALCIFEROL 1.25 MG/1
50000 CAPSULE ORAL WEEKLY
Qty: 2 CAPSULE | Refills: 0 | Status: SHIPPED | OUTPATIENT
Start: 2023-04-06 | End: 2023-04-14

## 2023-03-30 RX ADMIN — CEFAZOLIN 2000 MG: 2 INJECTION, POWDER, FOR SOLUTION INTRAMUSCULAR; INTRAVENOUS at 00:25

## 2023-03-30 RX ADMIN — OXYCODONE HYDROCHLORIDE 5 MG: 5 TABLET ORAL at 08:54

## 2023-03-30 RX ADMIN — SERTRALINE 100 MG: 100 TABLET, FILM COATED ORAL at 08:38

## 2023-03-30 RX ADMIN — OXYCODONE HYDROCHLORIDE 10 MG: 10 TABLET ORAL at 05:50

## 2023-03-30 RX ADMIN — BENZONATATE 100 MG: 100 CAPSULE ORAL at 13:57

## 2023-03-30 RX ADMIN — INSULIN LISPRO 20 UNITS: 100 INJECTION, SOLUTION INTRAVENOUS; SUBCUTANEOUS at 11:44

## 2023-03-30 RX ADMIN — CEFAZOLIN 2000 MG: 2 INJECTION, POWDER, FOR SOLUTION INTRAMUSCULAR; INTRAVENOUS at 15:57

## 2023-03-30 RX ADMIN — BENZONATATE 100 MG: 100 CAPSULE ORAL at 08:38

## 2023-03-30 RX ADMIN — GUAIFENESIN 400 MG: 400 TABLET ORAL at 13:58

## 2023-03-30 RX ADMIN — CEFAZOLIN 2000 MG: 2 INJECTION, POWDER, FOR SOLUTION INTRAMUSCULAR; INTRAVENOUS at 08:43

## 2023-03-30 RX ADMIN — CLONIDINE HYDROCHLORIDE 0.1 MG: 0.1 TABLET ORAL at 05:56

## 2023-03-30 RX ADMIN — INSULIN LISPRO 20 UNITS: 100 INJECTION, SOLUTION INTRAVENOUS; SUBCUTANEOUS at 08:40

## 2023-03-30 RX ADMIN — OXYCODONE HYDROCHLORIDE 10 MG: 10 TABLET ORAL at 15:59

## 2023-03-30 RX ADMIN — Medication 10 ML: at 08:50

## 2023-03-30 RX ADMIN — AMLODIPINE BESYLATE 5 MG: 5 TABLET ORAL at 08:38

## 2023-03-30 RX ADMIN — GUAIFENESIN 400 MG: 400 TABLET ORAL at 08:38

## 2023-03-30 RX ADMIN — ACETAMINOPHEN 325MG 650 MG: 325 TABLET ORAL at 11:44

## 2023-03-30 RX ADMIN — ACETAMINOPHEN 325MG 650 MG: 325 TABLET ORAL at 00:25

## 2023-03-30 RX ADMIN — INSULIN GLARGINE-YFGN 50 UNITS: 100 INJECTION, SOLUTION SUBCUTANEOUS at 08:39

## 2023-03-30 RX ADMIN — Medication 10 ML: at 08:40

## 2023-03-30 RX ADMIN — SODIUM CHLORIDE, PRESERVATIVE FREE 10 ML: 5 INJECTION INTRAVENOUS at 08:46

## 2023-03-30 RX ADMIN — HEPARIN 100 UNITS: 100 SYRINGE at 08:38

## 2023-03-30 RX ADMIN — ACETAMINOPHEN 325MG 650 MG: 325 TABLET ORAL at 05:51

## 2023-03-30 RX ADMIN — IPRATROPIUM BROMIDE AND ALBUTEROL SULFATE 1 AMPULE: 2.5; .5 SOLUTION RESPIRATORY (INHALATION) at 12:14

## 2023-03-30 RX ADMIN — PANTOPRAZOLE SODIUM 40 MG: 40 TABLET, DELAYED RELEASE ORAL at 05:51

## 2023-03-30 ASSESSMENT — PAIN DESCRIPTION - DESCRIPTORS: DESCRIPTORS: ACHING;SORE;TENDER

## 2023-03-30 ASSESSMENT — PAIN SCALES - GENERAL
PAINLEVEL_OUTOF10: 7
PAINLEVEL_OUTOF10: 6

## 2023-03-30 ASSESSMENT — PAIN DESCRIPTION - ORIENTATION: ORIENTATION: RIGHT

## 2023-03-30 ASSESSMENT — PAIN - FUNCTIONAL ASSESSMENT: PAIN_FUNCTIONAL_ASSESSMENT: ACTIVITIES ARE NOT PREVENTED

## 2023-03-30 ASSESSMENT — PAIN DESCRIPTION - LOCATION: LOCATION: INCISION

## 2023-03-30 NOTE — DISCHARGE INSTR - DIET

## 2023-03-30 NOTE — DISCHARGE INSTR - COC
thickness:59198}  Daily Fluid Restriction: {CHP DME Yes amt example:336051170}  Last Modified Barium Swallow with Video (Video Swallowing Test): {Done Not Done UKTB:170721252}    Treatments at the Time of Hospital Discharge:   Respiratory Treatments: ***  Oxygen Therapy:  {Therapy; copd oxygen:61490}  Ventilator:    {[facility] Vent PEGS:142455163}    Rehab Therapies: {THERAPEUTIC INTERVENTION:2169055318}  Weight Bearing Status/Restrictions: {[facility] Weight Bearin}  Other Medical Equipment (for information only, NOT a DME order):  {EQUIPMENT:100226905}  Other Treatments: ***    Patient's personal belongings (please select all that are sent with patient):  {P DME Belongings:307783349}    RN SIGNATURE:  {Esignature:332020683}    CASE MANAGEMENT/SOCIAL WORK SECTION    Inpatient Status Date: ***    Readmission Risk Assessment Score:  Readmission Risk              Risk of Unplanned Readmission:  11           Discharging to Facility/ Agency   Name:   Address:  Phone:  Fax:    Dialysis Facility (if applicable)   Name:  Address:  Dialysis Schedule:  Phone:  Fax:    / signature: {Esignature:482600951}    PHYSICIAN SECTION    Prognosis: Good    Condition at Discharge: Stable    Rehab Potential (if transferring to Rehab): Good    Recommended Labs or Other Treatments After Discharge: CBC, CMP weekly while on antibiotics     Physician Certification: I certify the above information and transfer of [place]  is necessary for the continuing treatment of the diagnosis listed and that he requires Home Care for less 30 days.      Update Admission H&P: No change in H&P    PHYSICIAN SIGNATURE:  Electronically signed by Jennifer Vinson DO on 3/30/23 at 12:12 PM EDT

## 2023-03-30 NOTE — PATIENT CARE CONFERENCE
P Quality Flow/Interdisciplinary Rounds Progress Note        Quality Flow Rounds held on March 30, 2023    Disciplines Attending:  Bedside Nurse, , , and Nursing Unit Leadership    Donny Stafford was admitted on 3/21/2023 10:37 AM    Anticipated Discharge Date:       Disposition:    Nikita Score:  Nikita Scale Score: 21    Readmission Risk              Risk of Unplanned Readmission:  14           Discussed patient goal for the day, patient clinical progression, and barriers to discharge.   The following Goal(s) of the Day/Commitment(s) have been identified:  discharge Justino Valiente, RN  March 30, 2023

## 2023-03-30 NOTE — CARE COORDINATION
Message was sent to Dari with [facility] regarding pt will dc today. [address] can start hhc services. in the am (03/31). [address] with [facility] notified pt will be discharging today. 1000  Notified Dari with [facility] pt's iv antibiotic will be due around 8am tomorrow morning. Pt's nurse is checking with ID regarding being able to discharge pt after his afternoon dose iv cefazolin and holding off on pm dose this evening. 1045  Met with pt in room. Informed him [facility] will be out in the am for iv antibiotic administration and teaching. Answered pt's questions regarding the setting up of hhc. Also, pt had questions regarding his worker's comp coverage. Discharge order on chart.

## 2023-03-30 NOTE — PROGRESS NOTES
stenosis at L4/L5 with moderate bilateral neural   foraminal narrowing. 5. Note made of a right pleural effusion with adjacent opacities likely   related to pneumonia. Note made of foci of gas within the pleural effusion   of possible infectious etiology. MRI THORACIC SPINE W WO CONTRAST   Final Result   1. Large bulky osteophytes are seen along the right aspect of T6/T7, T7/T8,   T8/T9, and T9/T10. There is edema and enhancement associated with these   osteophytes with surrounding soft tissue edema. Findings could suggest   recent trauma or repetitive injury. Follow-up MRI recommended to exclude   developing osteomyelitis. No evidence of fracture. 2. Additional edema present within right aspect of T9 vertebral body which   also may be related to recent trauma or repetitive injury. No evidence of T9   fracture. 3. No significant central canal stenosis involving the thoracic spine. 4. Mild central canal stenosis at L4/L5 with moderate bilateral neural   foraminal narrowing. 5. Note made of a right pleural effusion with adjacent opacities likely   related to pneumonia. Note made of foci of gas within the pleural effusion   of possible infectious etiology. XR CHEST (2 VW)   Final Result   New right pleural effusion with adjacent atelectasis and or infiltrate. Suboptimal inspiration. CT ABDOMEN PELVIS WO CONTRAST Additional Contrast? None   Final Result   Small right pleural effusion with atelectasis/pneumonia in the right lung   base. Foci of gas in the right pleural space may relate to infection. The      No acute abdominal findings. Constipation.          XR CHEST PORTABLE    (Results Pending)   XR CHEST PORTABLE    (Results Pending)       Medical Records/Labs/Images review:   I personally reviewed and summarized previous records   All labs and imaging were reviewed independently     [address] Lara Jacobson, a 56 y.o.-old male seen today for inpatient diabetes

## 2023-03-30 NOTE — PROGRESS NOTES
INTAKE/OUTPUT:    Intake/Output Summary (Last 24 hours) at 3/30/2023 1105  Last data filed at 3/30/2023 0939  Gross per 24 hour   Intake 1560 ml   Output 1450 ml   Net 110 ml       CURRENT PULSE OXIMETRY:  SpO2: 95 %  24HR PULSE OXIMETRY RANGE:  SpO2  Av %  Min: 94 %  Max: 96 %  CVP:    VENT SETTINGS:      Additional Respiratory Assessments  Heart Rate: 78  Resp: 20  SpO2: 95 %      EXAM:  General: No distress. Alert. Room air  Neck: Trachea midline. Resp: No accessory muscle use. No crackles. No wheezing. No rhonchi. Diminished on the right otherwise clear  CV: Regular rate. Regular rhythm. No mumur or rub. No edema. ABD: Non-tender. Non-distended. No masses. No organmegaly. Normal bowel sounds. Skin: Warm and dry. M/S: No cyanosis. No joint deformity. No clubbing. Neuro: Aox4  Chest: right chest -20 suction, no airleak, no subq empysema, serousang drainage     I/O: I/O last 3 completed shifts: In: 6655 [P.O.:3360]  Out: 7528 [Urine:3750; Chest Tube:24]  I/O this shift:  In: 120 [P.O.:120]  Out: -      Results:  CBC:   Recent Labs     23  0632 23  0616 23  0600   WBC 13.5* 9.9 10.2   HGB 13.1 11.9* 12.6   HCT 42.4 38.4 39.8   MCV 91.0 90.4 89.0    282 323       BMP:   Recent Labs     23  0632 23  0616 23  0600    136 140   K 4.5 4.7 4.4   CL 99 100 101   CO2 29 29 31*   BUN 19 18 15   CREATININE 0.9 0.8 0.9       LFT:   No results for input(s): ALKPHOS, ALT, AST, PROT, BILITOT, BILIDIR, LABALBU in the last 72 hours. PT/INR:   No results for input(s): PROTIME, INR in the last 72 hours. Cultures:       ABG:   No results for input(s): PH, PO2, PCO2, HCO3, BE, O2SAT in the last 72 hours.     Films:  CT ABDOMEN PELVIS WO CONTRAST Additional Contrast? None    Result Date: 3/21/2023  EXAMINATION: CT OF THE ABDOMEN AND PELVIS WITHOUT CONTRAST 3/21/2023 8:54 am TECHNIQUE: CT of the abdomen and pelvis was performed without the administration of intravenous contrast. Multiplanar reformatted images are provided for review. Automated exposure control, iterative reconstruction, and/or weight based adjustment of the mA/kV was utilized to reduce the radiation dose to as low as reasonably achievable. COMPARISON: None. HISTORY: ORDERING SYSTEM PROVIDED HISTORY: pain TECHNOLOGIST PROVIDED HISTORY: Additional Contrast?->None Reason for exam:->pain Decision Support Exception - unselect if not a suspected or confirmed emergency medical condition->Emergency Medical Condition (MA) What reading provider will be dictating this exam?->CRC FINDINGS: Mild gynecomastia. Small fat filled umbilical hernia. Fat filled right inguinal hernia. Small right pleural effusion with associated atelectasis or pneumonia in the right lung base. Small foci of gas in the right pleural space may represent infection. No pneumoperitoneum. Evaluation for neoplasm, infection, focal lesions, and trauma is limited without the use of IV contrast.  Suspect fatty infiltration of the liver. Gallbladder negative. Spleen mildly enlarged 14 cm craniocaudal.  Pancreas and adrenal glands negative. No hydronephrosis. Bowel negative for obstruction or inflammation. Large colonic stool burden. Normal appendix. Atherosclerotic nondilated aorta. No suspicious lymphadenopathy or significant ascites. Macro bladder Bones negative. Small right pleural effusion with atelectasis/pneumonia in the right lung base. Foci of gas in the right pleural space may relate to infection. The No acute abdominal findings. Constipation. XR CHEST (2 VW)    Result Date: 3/21/2023  EXAMINATION: TWO XRAY VIEWS OF THE CHEST 3/21/2023 8:13 am COMPARISON: 19 August 2022 HISTORY: ORDERING SYSTEM PROVIDED HISTORY: pain TECHNOLOGIST PROVIDED HISTORY: Reason for exam:->pain What reading provider will be dictating this exam?->CRC FINDINGS: Suboptimal inspiration.   New right pleural effusion with adjacent atelectasis and or

## 2023-03-30 NOTE — DISCHARGE SUMMARY
hemodynamically significant pericardial effusion. No echocardiographic evidence of endocarditis. Please consider JOE if   clinical suspicion for endocarditis is high. Signature      ----------------------------------------------------------------   Electronically signed by Yakelin Brambila MD(Interpreting   physician) on 03/23/2023 08:55 PM   ----------------------------------------------------------------      Disposition:  Home with home health     Condition at discharge: Stable     Discharge Instructions/Follow-up:  Endocrinology, Thoracic surgery, neurosurgery, PCP, and ID. Code Status:  Full Code     Activity: activity as tolerated    Diet: cardiac diet    Labs: For convenience and continuity at follow-up the following most recent labs are provided:      CBC:    Lab Results   Component Value Date/Time    WBC 10.2 03/30/2023 06:00 AM    HGB 12.6 03/30/2023 06:00 AM    HCT 39.8 03/30/2023 06:00 AM     03/30/2023 06:00 AM       Renal:    Lab Results   Component Value Date/Time     03/30/2023 06:00 AM    K 4.4 03/30/2023 06:00 AM    K 4.1 03/22/2023 06:07 AM     03/30/2023 06:00 AM    CO2 31 03/30/2023 06:00 AM    BUN 15 03/30/2023 06:00 AM    CREATININE 0.9 03/30/2023 06:00 AM    CALCIUM 8.8 03/30/2023 06:00 AM       Discharge Medications:     Current Discharge Medication List             Details   lactobacillus (CULTURELLE) CAPS capsule Take 1 capsule by mouth 2 times daily  Qty: 30 capsule, Refills: 0      Ergocalciferol (VITAMIN D) 99252 units CAPS Take 50,000 Units by mouth once a week for 2 doses  Qty: 2 capsule, Refills: 0      oxyCODONE-acetaminophen (PERCOCET) 5-325 MG per tablet Take 1 tablet by mouth every 6 hours as needed for Pain for up to 7 days. Intended supply: 7 days.  Take lowest dose possible to manage pain Max Daily Amount: 4 tablets  Qty: 28 tablet, Refills: 0    Comments: Reduce doses taken as pain becomes manageable  Associated Diagnoses: Post-op pain      insulin aspart (NOVOLOG FLEXPEN) 100 UNIT/ML injection pen Inject 15 units with meals + following sliding scale -200 add 3U, -250 add 6U, -300 add 9U, -350 add 12U, -400 add 15U, BS over 400 add 18U  Qty: 5 Adjustable Dose Pre-filled Pen Syringe, Refills: 3      ceFAZolin (ANCEF) infusion Infuse 2,000 mg intravenously in the morning and 2,000 mg at noon and 2,000 mg in the evening. Do all this for 27 days. Compound per protocol. Qty: 162 g, Refills: 0                Details   amLODIPine (NORVASC) 5 MG tablet TAKE ONE TABLET BY MOUTH EVERY DAY      methocarbamol (ROBAXIN) 750 MG tablet TAKE ONE TABLET BY MOUTH EVERY 8 HOURS AS NEEDED FOR PAIN      omeprazole (PRILOSEC) 40 MG delayed release capsule TAKE ONE CAPSULE BY MOUTH once DAILY for 30 days      rosuvastatin (CRESTOR) 20 MG tablet rosuvastatin 20 mg tablet   TAKE ONE TABLET BY MOUTH ONCE A DAY AS DIRECTED      sucralfate (CARAFATE) 1 GM tablet TAKE ONE TABLET BY MOUTH FOUR TIMES A DAY (with meals and at bedtime) for 7 days      sertraline (ZOLOFT) 100 MG tablet Take 100 mg by mouth daily      ibuprofen (ADVIL;MOTRIN) 800 MG tablet Take 1 tablet by mouth 2 times daily as needed for Pain  Qty: 60 tablet, Refills: 0    Associated Diagnoses: Spinal stenosis of lumbar region, unspecified whether neurogenic claudication present; S/P laminectomy      Semaglutide,0.25 or 0.5MG/DOS, (OZEMPIC, 0.25 OR 0.5 MG/DOSE,) 2 MG/1.5ML SOPN Inject into the skin once a week On Sundays      Insulin Degludec (TRESIBA) 100 UNIT/ML SOLN Inject 80 Units into the skin daily      metFORMIN (GLUCOPHAGE) 500 MG tablet Take 1,000 mg by mouth in the morning and 1,000 mg in the evening. Take with meals. losartan (COZAAR) 25 MG tablet Take 25 mg by mouth in the morning. Time Spent on discharge was 36 minutes in the examination, evaluation, counseling and review of medications and discharge plan.       Signed:    Azeb Grier DO   3/30/2023

## 2023-04-01 LAB
BACTERIA SPEC ANAEROBE CULT: NORMAL
BACTERIA SPEC ANAEROBE CULT: NORMAL

## 2023-04-17 LAB
FUNGUS SPEC CULT: NORMAL
FUNGUS SPEC CULT: NORMAL
LOEFFLER MB STN SPEC: NORMAL
LOEFFLER MB STN SPEC: NORMAL

## 2023-04-18 LAB
ACID FAST STN SPEC QL: NORMAL
ACID FAST STN SPEC QL: NORMAL
MYCOBACTERIUM SPEC CULT: NORMAL
MYCOBACTERIUM SPEC CULT: NORMAL

## 2023-04-26 ENCOUNTER — TELEPHONE (OUTPATIENT)
Dept: NEUROSURGERY | Age: 58
End: 2023-04-26

## 2023-04-26 ENCOUNTER — TELEPHONE (OUTPATIENT)
Dept: CARDIOTHORACIC SURGERY | Age: 58
End: 2023-04-26

## 2023-04-26 NOTE — TELEPHONE ENCOUNTER
Pt Lm stating he needs more time off work. They want him back now on full duty. Stated he only has 75-80% lung capacity and his ribs still hurt. How long and what restrictions can we keep him off for?  Please advise

## 2023-04-26 NOTE — TELEPHONE ENCOUNTER
Patient contacted office and left a message on the voicemail stating that the letter you did for him is very misleading he was under the impression that he was to stay home and rest and that's what he has been doing. He took himself out of work. He stated he received a call from his boss and that his boss wants him back to work tomorrow 04.27.2023. Patient wants a call back today from [facility].

## 2023-05-04 ENCOUNTER — TELEPHONE (OUTPATIENT)
Dept: NEUROSURGERY | Age: 58
End: 2023-05-04

## 2023-05-04 DIAGNOSIS — M54.50 CHRONIC BILATERAL LOW BACK PAIN WITHOUT SCIATICA: Primary | ICD-10-CM

## 2023-05-04 DIAGNOSIS — M54.9 MID BACK PAIN: ICD-10-CM

## 2023-05-04 DIAGNOSIS — G89.29 CHRONIC BILATERAL LOW BACK PAIN WITHOUT SCIATICA: Primary | ICD-10-CM

## 2023-05-04 NOTE — TELEPHONE ENCOUNTER
Patient contacted office and left message on voicemail that he was still having a sharp pain and that you had told him that there was nothing else that our office could do. Wants appointment and return call.  Please advise Pt complete Arsenic at this time; Pt VSS, afebrile and tolerated infusion well; Right Chest PAC flushed and hep locked per protocol with 500 units heparin; green cap placed on end of PRN adapter; Mother advised next infusion for tomorrow morning; Mother Verbalizes understanding;

## 2023-05-04 NOTE — TELEPHONE ENCOUNTER
Will order repeat XR and call and discuss results with him and further treatment plan/recommendations

## 2023-05-04 NOTE — TELEPHONE ENCOUNTER
Return call to patient with detailed instructions from provider regarding repeat xray and provider will call to discuss treatment plan/recommendations.

## 2023-05-17 ENCOUNTER — HOSPITAL ENCOUNTER (OUTPATIENT)
Dept: GENERAL RADIOLOGY | Age: 58
Discharge: HOME | End: 2023-05-19
Payer: OTHER MISCELLANEOUS

## 2023-05-17 ENCOUNTER — HOSPITAL ENCOUNTER (OUTPATIENT)
Dept: GENERAL RADIOLOGY | Age: 58
Discharge: HOME | End: 2023-05-19

## 2023-05-17 ENCOUNTER — HOSPITAL ENCOUNTER (OUTPATIENT)
Age: 58
Discharge: HOME | End: 2023-05-19

## 2023-05-17 DIAGNOSIS — M54.50 CHRONIC BILATERAL LOW BACK PAIN WITHOUT SCIATICA: ICD-10-CM

## 2023-05-17 DIAGNOSIS — G89.29 CHRONIC BILATERAL LOW BACK PAIN WITHOUT SCIATICA: ICD-10-CM

## 2023-05-17 DIAGNOSIS — M54.9 MID BACK PAIN: ICD-10-CM

## 2023-05-17 PROCEDURE — 72100 X-RAY EXAM L-S SPINE 2/3 VWS: CPT

## 2023-05-17 PROCEDURE — 72072 X-RAY EXAM THORAC SPINE 3VWS: CPT

## 2023-06-01 ENCOUNTER — TELEPHONE (OUTPATIENT)
Dept: NEUROSURGERY | Age: 58
End: 2023-06-01

## 2023-06-01 NOTE — TELEPHONE ENCOUNTER
Patient called, no answer left voicemail. XR with degenerative changes patient to continue with conservative therapy.

## 2023-09-08 ENCOUNTER — TELEPHONE (OUTPATIENT)
Dept: NEUROSURGERY | Age: 58
End: 2023-09-08

## 2023-09-08 NOTE — TELEPHONE ENCOUNTER
Patient was last seen in April for a muscle strain, XR have since been ordered and reviewed with patient with no acute findings. Patient can follow up in office for further evaluation of back pain if needed.

## 2025-08-07 ENCOUNTER — HOSPITAL ENCOUNTER (EMERGENCY)
Age: 60
Discharge: HOME | End: 2025-08-07
Payer: OTHER MISCELLANEOUS

## 2025-08-07 ENCOUNTER — APPOINTMENT (OUTPATIENT)
Dept: CT IMAGING | Age: 60
End: 2025-08-07
Payer: OTHER MISCELLANEOUS

## 2025-08-07 VITALS
OXYGEN SATURATION: 96 % | HEART RATE: 73 BPM | TEMPERATURE: 98 F | SYSTOLIC BLOOD PRESSURE: 180 MMHG | RESPIRATION RATE: 16 BRPM | DIASTOLIC BLOOD PRESSURE: 98 MMHG

## 2025-08-07 DIAGNOSIS — S39.012A STRAIN OF LUMBAR REGION, INITIAL ENCOUNTER: ICD-10-CM

## 2025-08-07 DIAGNOSIS — S16.1XXA ACUTE STRAIN OF NECK MUSCLE, INITIAL ENCOUNTER: ICD-10-CM
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
## 2025-08-07 DIAGNOSIS — S09.90XA CLOSED HEAD INJURY, INITIAL ENCOUNTER: ICD-10-CM

## 2025-08-07 PROCEDURE — 99284 EMERGENCY DEPT VISIT MOD MDM: CPT

## 2025-08-07 PROCEDURE — 6360000002 HC RX W HCPCS: Performed by: PHYSICIAN ASSISTANT

## 2025-08-07 PROCEDURE — 72125 CT NECK SPINE W/O DYE: CPT

## 2025-08-07 PROCEDURE — 70450 CT HEAD/BRAIN W/O DYE: CPT

## 2025-08-07 PROCEDURE — 6370000000 HC RX 637 (ALT 250 FOR IP): Performed by: PHYSICIAN ASSISTANT

## 2025-08-07 PROCEDURE — 72131 CT LUMBAR SPINE W/O DYE: CPT

## 2025-08-07 PROCEDURE — 96372 THER/PROPH/DIAG INJ SC/IM: CPT

## 2025-08-07 RX ORDER — KETOROLAC TROMETHAMINE 30 MG/ML
30 INJECTION, SOLUTION INTRAMUSCULAR; INTRAVENOUS ONCE
Status: COMPLETED | OUTPATIENT
Start: 2025-08-07 | End: 2025-08-07

## 2025-08-07 RX ORDER — ACETAMINOPHEN 500 MG
1000 TABLET ORAL ONCE
Status: COMPLETED | OUTPATIENT
Start: 2025-08-07 | End: 2025-08-07

## 2025-08-07 RX ADMIN — ACETAMINOPHEN 1000 MG: 500 TABLET ORAL at 15:17

## 2025-08-07 RX ADMIN — KETOROLAC TROMETHAMINE 30 MG: 30 INJECTION, SOLUTION INTRAMUSCULAR at 15:18

## 2025-08-07 ASSESSMENT — PAIN - FUNCTIONAL ASSESSMENT: PAIN_FUNCTIONAL_ASSESSMENT: 0-10

## 2025-08-07 ASSESSMENT — PAIN SCALES - GENERAL
PAINLEVEL_OUTOF10: 3
PAINLEVEL_OUTOF10: 3

## 2025-08-07 ASSESSMENT — PAIN DESCRIPTION - DESCRIPTORS
DESCRIPTORS: BURNING
DESCRIPTORS: BURNING

## 2025-08-07 ASSESSMENT — PAIN DESCRIPTION - LOCATION
LOCATION: NECK
LOCATION: NECK

## (undated) DEVICE — GLOVE ORANGE PI 7   MSG9070

## (undated) DEVICE — WOUND RETRACTOR AND PROTECTOR: Brand: ALEXIS WOUND PROTECTOR-RETRACTOR

## (undated) DEVICE — THORACIC: Brand: MEDLINE INDUSTRIES, INC.

## (undated) DEVICE — ELECTRODE PT RET AD L9FT HI MOIST COND ADH HYDRGEL CORDED

## (undated) DEVICE — TROCAR: Brand: KII FIOS FIRST ENTRY

## (undated) DEVICE — 3M™ IOBAN™ 2 ANTIMICROBIAL INCISE DRAPE 6650EZ: Brand: IOBAN™ 2

## (undated) DEVICE — CATHETER THOR 28FR L23CM DIA9.3MM POLYVI CHL TAPR CONN TIP

## (undated) DEVICE — GLOVE SURG SZ 65 THK91MIL LTX FREE SYN POLYISOPRENE

## (undated) DEVICE — TUBING SUCT 12FR MAL ALUM SHFT FN CAP VENT UNIV CONN W/ OBT

## (undated) DEVICE — GLOVE ORANGE PI 8   MSG9080

## (undated) DEVICE — 5.0MM PRECISION ROUND

## (undated) DEVICE — APPLICATOR PREP 26ML 0.7% IOD POVACRYLEX 74% ISO ALC ST

## (undated) DEVICE — SYRINGE 20ML LL S/C 50

## (undated) DEVICE — ADHESIVE SKIN CLOSURE TOP 36 CC HI VISC DERMBND MINI

## (undated) DEVICE — LUMBAR LAMINECTOMY: Brand: MEDLINE INDUSTRIES, INC.

## (undated) DEVICE — GLOVE SURG 8.5 PF POLYMER WHT STRL SIGN LTX ESSENTIAL LTX

## (undated) DEVICE — E-Z CLEAN, NON-STICK, PTFE COATED, ELECTROSURGICAL BLADE ELECTRODE, MODIFIED EXTENDED INSULATION, 2.5 INCH (6.35 CM): Brand: MEGADYNE

## (undated) DEVICE — READY WET SKIN SCRUB TRAY-LF: Brand: MEDLINE INDUSTRIES, INC.

## (undated) DEVICE — TUBING, SUCTION, 3/16" X 12', STRAIGHT: Brand: MEDLINE

## (undated) DEVICE — CLEANER,CAUTERY TIP,2X2",STERILE: Brand: MEDLINE

## (undated) DEVICE — WARMER SCP LAP

## (undated) DEVICE — JACKSON TABLE POSITIONER KIT: Brand: MEDLINE INDUSTRIES, INC.

## (undated) DEVICE — 3M(TM) MEDIPORE(TM) +PAD SOFT CLOTH ADHESIVE WOUND DRESSING 3569: Brand: 3M™ MEDIPORE™

## (undated) DEVICE — DRAPE,REIN 53X77,STERILE: Brand: MEDLINE

## (undated) DEVICE — DRAIN SURG SGL COLL PT TB FOR ATS BG OASIS

## (undated) DEVICE — AGENT HEMSTAT W4XL8IN OXIDIZED REGENERATED CELOS ABSRB

## (undated) DEVICE — DOUBLE BASIN SET: Brand: MEDLINE INDUSTRIES, INC.

## (undated) DEVICE — BLADE ES L6IN ELASTOMERIC COAT EXT DURABLE BEND UPTO 90DEG

## (undated) DEVICE — TRAP,MUCUS SPECIMEN,40CC: Brand: MEDLINE

## (undated) DEVICE — SYRINGE MED 50ML LUERLOCK TIP

## (undated) DEVICE — HYPODERMIC SAFETY NEEDLE: Brand: MAGELLAN

## (undated) DEVICE — NEEDLE SPNL L3.5IN PNK HUB S STL REG WALL FIT STYL W/ QNCKE